# Patient Record
Sex: MALE | Race: BLACK OR AFRICAN AMERICAN | Employment: UNEMPLOYED | URBAN - METROPOLITAN AREA
[De-identification: names, ages, dates, MRNs, and addresses within clinical notes are randomized per-mention and may not be internally consistent; named-entity substitution may affect disease eponyms.]

---

## 2017-09-28 ENCOUNTER — OFFICE VISIT (OUTPATIENT)
Dept: FAMILY MEDICINE CLINIC | Age: 52
End: 2017-09-28

## 2017-09-28 ENCOUNTER — HOSPITAL ENCOUNTER (OUTPATIENT)
Dept: LAB | Age: 52
Discharge: HOME | End: 2017-09-28

## 2017-09-28 VITALS
BODY MASS INDEX: 26.63 KG/M2 | SYSTOLIC BLOOD PRESSURE: 141 MMHG | OXYGEN SATURATION: 98 % | TEMPERATURE: 97.9 F | HEART RATE: 69 BPM | WEIGHT: 186 LBS | RESPIRATION RATE: 16 BRPM | HEIGHT: 70 IN | DIASTOLIC BLOOD PRESSURE: 88 MMHG

## 2017-09-28 DIAGNOSIS — M62.838 CERVICAL PARASPINAL MUSCLE SPASM: ICD-10-CM

## 2017-09-28 DIAGNOSIS — I10 BENIGN ESSENTIAL HYPERTENSION WITH TARGET BLOOD PRESSURE BELOW 140/90: ICD-10-CM

## 2017-09-28 DIAGNOSIS — T73.3XXS FATIGUE DUE TO EXCESSIVE EXERTION, SEQUELA: ICD-10-CM

## 2017-09-28 DIAGNOSIS — H66.93 CHRONIC EAR INFECTION, BILATERAL: ICD-10-CM

## 2017-09-28 DIAGNOSIS — J30.1 ALLERGIC RHINITIS DUE TO POLLEN, UNSPECIFIED RHINITIS SEASONALITY: ICD-10-CM

## 2017-09-28 DIAGNOSIS — M13.0 POLYARTICULAR ARTHRITIS: ICD-10-CM

## 2017-09-28 DIAGNOSIS — F41.9 ANXIETY: ICD-10-CM

## 2017-09-28 DIAGNOSIS — Z29.9 PREVENTIVE MEASURE: ICD-10-CM

## 2017-09-28 DIAGNOSIS — R35.89 POLYURIA: ICD-10-CM

## 2017-09-28 DIAGNOSIS — M05.721 RHEUMATOID ARTHRITIS INVOLVING RIGHT ELBOW WITH POSITIVE RHEUMATOID FACTOR (HCC): ICD-10-CM

## 2017-09-28 DIAGNOSIS — E79.0 HYPERURICEMIA: Primary | ICD-10-CM

## 2017-09-28 PROCEDURE — 99001 SPECIMEN HANDLING PT-LAB: CPT | Performed by: INTERNAL MEDICINE

## 2017-09-28 RX ORDER — SILDENAFIL 50 MG/1
50 TABLET, FILM COATED ORAL AS NEEDED
Qty: 6 TAB | Refills: 0 | Status: SHIPPED | OUTPATIENT
Start: 2017-09-28 | End: 2020-10-07 | Stop reason: SDUPTHER

## 2017-09-28 RX ORDER — LANOLIN ALCOHOL/MO/W.PET/CERES
500 CREAM (GRAM) TOPICAL DAILY
Qty: 90 TAB | Refills: 1 | Status: SHIPPED | OUTPATIENT
Start: 2017-09-28 | End: 2019-10-03

## 2017-09-28 RX ORDER — NABUMETONE 750 MG/1
750 TABLET, FILM COATED ORAL
Qty: 60 TAB | Refills: 3 | Status: SHIPPED | OUTPATIENT
Start: 2017-09-28 | End: 2017-09-28 | Stop reason: CLARIF

## 2017-09-28 RX ORDER — FLUTICASONE PROPIONATE 50 MCG
2 SPRAY, SUSPENSION (ML) NASAL DAILY
Qty: 1 BOTTLE | Refills: 3 | Status: SHIPPED | OUTPATIENT
Start: 2017-09-28 | End: 2019-10-03

## 2017-09-28 RX ORDER — LISINOPRIL 40 MG/1
40 TABLET ORAL DAILY
Qty: 90 TAB | Refills: 3 | Status: SHIPPED | OUTPATIENT
Start: 2017-09-28 | End: 2017-10-03 | Stop reason: SINTOL

## 2017-09-28 RX ORDER — LORATADINE 10 MG/1
10 TABLET ORAL DAILY
Qty: 90 TAB | Refills: 1 | Status: SHIPPED | OUTPATIENT
Start: 2017-09-28 | End: 2019-10-03

## 2017-09-28 RX ORDER — CITALOPRAM 20 MG/1
20 TABLET, FILM COATED ORAL DAILY
Qty: 30 TAB | Refills: 0 | Status: SHIPPED | OUTPATIENT
Start: 2017-09-28 | End: 2019-10-03

## 2017-09-28 RX ORDER — LISINOPRIL 40 MG/1
40 TABLET ORAL DAILY
Qty: 90 TAB | Refills: 1 | Status: CANCELLED | OUTPATIENT
Start: 2017-09-28

## 2017-09-28 RX ORDER — AMLODIPINE BESYLATE 10 MG/1
10 TABLET ORAL DAILY
Qty: 90 TAB | Refills: 3 | Status: SHIPPED | OUTPATIENT
Start: 2017-09-28 | End: 2021-10-20 | Stop reason: SDUPTHER

## 2017-09-28 RX ORDER — MELOXICAM 15 MG/1
15 TABLET ORAL DAILY
Qty: 90 TAB | Refills: 2 | Status: SHIPPED | OUTPATIENT
Start: 2017-09-28 | End: 2018-08-28 | Stop reason: SDUPTHER

## 2017-09-28 RX ORDER — CYCLOBENZAPRINE HCL 10 MG
10 TABLET ORAL
Qty: 90 TAB | Refills: 1 | Status: SHIPPED | OUTPATIENT
Start: 2017-09-28 | End: 2019-10-03

## 2017-09-28 RX ORDER — PREDNISONE 20 MG/1
20 TABLET ORAL
Qty: 7 TAB | Refills: 0 | Status: SHIPPED | OUTPATIENT
Start: 2017-09-28 | End: 2017-10-05

## 2017-09-28 RX ORDER — FLUOXETINE 20 MG/1
20 TABLET ORAL DAILY
Qty: 20 TAB | Refills: 0 | Status: CANCELLED | OUTPATIENT
Start: 2017-09-28

## 2017-09-28 NOTE — MR AVS SNAPSHOT
Visit Information Date & Time Provider Department Dept. Phone Encounter #  
 9/28/2017  9:30 AM Noah Meyer, Lm7 KAREN Landers [id number] Follow-up Instructions Return in about 3 weeks (around 10/18/2017). Upcoming Health Maintenance Date Due Pneumococcal 19-64 Medium Risk (1 of 1 - PPSV23) 9/5/1982 DTaP/Tdap/Td series (1 - Tdap) 9/5/1984 FOBT Q 1 YEAR AGE 50-75 9/5/2013 INFLUENZA AGE 9 TO ADULT 8/1/2017 Allergies as of 9/28/2017  Review Complete On: 9/28/2017 By: Noah Meyer, DO No Known Allergies Current Immunizations  Never Reviewed No immunizations on file. Not reviewed this visit You Were Diagnosed With   
  
 Codes Comments Hyperuricemia    -  Primary ICD-10-CM: E79.0 ICD-9-CM: 790.6 Chronic ear infection, bilateral     ICD-10-CM: B95.926 ICD-9-CM: 381. 3 Benign essential hypertension with target blood pressure below 140/90     ICD-10-CM: I10 
ICD-9-CM: 401.1 Polyarticular arthritis     ICD-10-CM: M13.0 ICD-9-CM: 716.50 Rheumatoid arthritis involving right elbow with positive rheumatoid factor ([facility] [address]     ICD-10-CM: C77.017 ICD-9-CM: 714.0 Cervical paraspinal muscle spasm     ICD-10-CM: V06.918 ICD-9-CM: 728.85 Anxiety     ICD-10-CM: F41.9 ICD-9-CM: 300.00 Preventive measure     ICD-10-CM: Z29.9 ICD-9-CM: V07.9 Polyuria     ICD-10-CM: R35.8 ICD-9-CM: 788.42 Fatigue due to excessive exertion, sequela     ICD-10-CM: T73. 3XXS 
ICD-9-CM: 994.5, E927.9 Vitals BP Pulse Temp Resp Height(growth percentile) Weight(growth percentile) 141/88 (BP 1 Location: Right arm, BP Patient Position: Sitting) 69 97.9 °F (36.6 °C) (Oral) 16 5' 10\" (1.778 m) 186 lb (84.4 kg) SpO2 BMI Smoking Status 98% 26.69 kg/m2 Current Every Day Smoker BMI and BSA Data Body Mass Index Body Surface Area  
 26.69 kg/m 2 2.04 m 2 Preferred Pharmacy Pharmacy Name Phone [facility] #[id number] [address] [phone] Your Updated Medication List  
  
   
This list is accurate as of: 9/28/17 10:11 AM.  Always use your most recent med list.  
  
  
  
  
 allopurinol 100 mg tablet Commonly known as:  [place] Take 2 Tabs by mouth daily. amLODIPine 10 mg tablet Commonly known as:  Linda Shield Take 1 Tab by mouth daily. Indications: hypertension  
  
 chlorthalidone 50 mg tablet Commonly known as:  Charlesetta Clear Take 1 Tab by mouth daily. citalopram 20 mg tablet Commonly known as:  Dearl Bury Take 1 Tab by mouth daily. Indications: GENERALIZED ANXIETY DISORDER  
  
 cyanocobalamin 500 mcg tablet Commonly known as:  VITAMIN B12 Take 1 Tab by mouth daily. cyclobenzaprine 10 mg tablet Commonly known as:  FLEXERIL Take 1 Tab by mouth three (3) times daily as needed for Muscle Spasm(s). ergocalciferol 50,000 unit capsule Commonly known as:  ERGOCALCIFEROL Take 1 Cap by mouth every seven (7) days. FLUoxetine 20 mg tablet Commonly known as:  PROzac Take 1 Tab by mouth daily. fluticasone 50 mcg/actuation nasal spray Commonly known as:  FLONASE ALLERGY RELIEF  
2 Sprays by Both Nostrils route daily. lisinopril 40 mg tablet Commonly known as:  Beatriz Bills Take 1 Tab by mouth daily. loratadine 10 mg tablet Commonly known as:  Kip Manual Take 1 Tab by mouth daily. losartan 100 mg tablet Commonly known as:  COZAAR Take 1 Tab by mouth daily. meloxicam 15 mg tablet Commonly known as:  MOBIC Take 1 Tab by mouth daily. nabumetone 750 mg tablet Commonly known as:  RELAFEN Take 1 Tab by mouth two (2) times daily as needed for Pain (Start after completing steroid pack). oxyCODONE-acetaminophen 5-325 mg per tablet Commonly known as:  PERCOCET Take 1 Tab by mouth every eight (8) hours as needed for Pain.  Max Daily Amount: 3 Tabs. predniSONE 20 mg tablet Commonly known as:  Jose M Jabs Take 1 Tab by mouth daily (with breakfast) for 7 days. probenecid-colchicine 500-0.5 mg per tablet Commonly known as:  ColBenemid Take 1 Tab by mouth two (2) times a day. sildenafil citrate 50 mg tablet Commonly known as:  VIAGRA Take 1 Tab by mouth as needed. traMADol 50 mg tablet Commonly known as:  ULTRAM  
Use every 8 hours as needed for breakthrough pain Prescriptions Sent to Pharmacy Refills  
 loratadine (CLARITIN) 10 mg tablet 1 Sig: Take 1 Tab by mouth daily. Class: Normal  
 Pharmacy: [facility] [address] Ph #: [phone] Route: Oral  
 nabumetone (RELAFEN) 750 mg tablet 3 Sig: Take 1 Tab by mouth two (2) times daily as needed for Pain (Start after completing steroid pack). Class: Normal  
 Pharmacy: [facility] #[id number] [address] Ph #: [phone] Route: Oral  
 cyclobenzaprine (FLEXERIL) 10 mg tablet 1 Sig: Take 1 Tab by mouth three (3) times daily as needed for Muscle Spasm(s). Class: Normal  
 Pharmacy: [facility] #[id number] [address] Ph #: [phone] Route: Oral  
 sildenafil citrate (VIAGRA) 50 mg tablet 0 Sig: Take 1 Tab by mouth as needed. Class: Normal  
 Pharmacy: [facility] #[id number] [address] Ph #: [phone] Route: Oral  
 meloxicam (MOBIC) 15 mg tablet 2 Sig: Take 1 Tab by mouth daily. Class: Normal  
 Pharmacy: [facility] #[id number] [address] Ph #: [phone] Route: Oral  
 amLODIPine (NORVASC) 10 mg tablet 3 Sig: Take 1 Tab by mouth daily. Indications: hypertension Class: Normal  
 Pharmacy: [facility] #[id number] [facility], [address] [place] [facility] Ph #: [phone]  Route: Oral  
 predniSONE (DELTASONE) 20 mg tablet 0  
 Sig: Take 1 Tab by mouth daily (with breakfast) for 7 days. Class: Normal  
 Pharmacy: [facility] #4445 [address] Ph #: [phone] Route: Oral  
 lisinopril (PRINIVIL, ZESTRIL) 40 mg tablet 3 Sig: Take 1 Tab by mouth daily. Class: Normal  
 Pharmacy: [facility] #[id number] [address] Ph #: [phone] Route: Oral  
 citalopram (CELEXA) 20 mg tablet 0 Sig: Take 1 Tab by mouth daily. Indications: GENERALIZED ANXIETY DISORDER Class: Normal  
 Pharmacy: [facility] #5664 [address] Ph #: [phone] Route: Oral  
 cyanocobalamin (VITAMIN B12) 500 mcg tablet 1 Sig: Take 1 Tab by mouth daily. Class: Normal  
 Pharmacy: [facility] #7541 [address] Ph #: [phone] Route: Oral  
  
We Performed the Following PSA SCREENING (SCREENING) [ [facility]] REFERRAL TO RHEUMATOLOGY [EYK07 Custom] Comments:  
 Please evaluate patient for Rheumatoid Arthritis. Follow-up Instructions Return in about 3 weeks (around 10/18/2017). To-Do List   
 09/28/2017 Lab:  HEMOGLOBIN A1C WITH EAG   
  
 09/28/2017 Lab:  TESTOSTERONE, FREE & TOTAL   
  
 09/28/2017 Lab:  URIC ACID   
  
 09/28/2017 Lab:  VITAMIN D, 25 HYDROXY Around 12/27/2017 Lab:  CBC WITH AUTOMATED DIFF Around 12/27/2017 Lab:  LIPID PANEL Around 12/27/2017 Lab:  METABOLIC PANEL, COMPREHENSIVE Around 12/27/2017 Lab:  SED RATE (ESR) Around 12/27/2017 Lab:  SED RATE (ESR) Around 12/27/2017 Lab:  TSH 3RD GENERATION Referral Information Referral ID Referred By Referred To  
  
 [id number] [place], PAA-KOFI Maranda Cranker, MD   
   [address] [facility] Phone: [phone] Fax: [phone] Visits Status Start Date End Date 1 New Request 9/28/17 9/28/18 If your referral has a status of pending review or denied, additional information will be sent to support the outcome of this decision. Patient Instructions Please contact our office if you have any questions about your visit today. 1.) They will call you regarding referral to Dr. Obed Garcia, Rheumatologist 
 
2.) Use Prednisone for week. 3.) Use Meloxicam as needed as well as B12 supplement Introducing [facility] SERVICES! Raimundo Valdez introduces [employer] patient portal. Now you can access parts of your medical record, email your doctor's office, and request medication refills online. 1. In your internet browser, go to [email or web address] 2. Click on the First Time User? Click Here link in the Sign In box. You will see the New Member Sign Up page. 3. Enter your [employer] Access Code exactly as it appears below. You will not need to use this code after youve completed the sign-up process. If you do not sign up before the expiration date, you must request a new code. · [employer] Access Code: [email or web address] Expires: 12/27/2017  9:18 AM 
 
4. Enter the last four digits of your Social Security Number (xxxx) and Date of Birth (mm/dd/yyyy) as indicated and click Submit. You will be taken to the next sign-up page. 5. Create a ChaseFuture ID. This will be your [employer] login ID and cannot be changed, so think of one that is secure and easy to remember. 6. Create a ChaseFuture password. You can change your password at any time. 7. Enter your Password Reset Question and Answer. This can be used at a later time if you forget your password. 8. Enter your e-mail address. You will receive e-mail notification when new information is available in [address] 9. Click Sign Up. You can now view and download portions of your medical record. 10. Click the Download Summary menu link to download a portable copy of your medical information. If you have questions, please visit the Frequently Asked Questions section of the [employer] website. Remember, [employer] is NOT to be used for urgent needs. For medical emergencies, dial 911. Now available from your iPhone and Android! Please provide this summary of care documentation to your next provider. Your primary care clinician is listed as 85906Jackie Fuentes. If you have any questions after today's visit, please call [phone].

## 2017-09-28 NOTE — PROGRESS NOTES
Progress Note    Patient: Ijeoma Yates MRN: [id number]  SSN: [id number]    YOB: 1965  Age: 45 y.o. Sex: male          Subjective:   Ijeoma Yates is a 45 y.o. male who is here for follow up. The patient is concerned about his blood pressure. He states that he has been getting dizziness and headache. He mentions that he has been getting up to urinate multiple times in the night---about 4-5 times a night. He denies any burning on urination. He denies any blood in the urine. He also mentions that he has been fatigued. He states that he was out of his BP meds for about a week. He states that he just got his refills yesterday. The patient mentions that he has become very nervous as well. He has been feeling this way for the past 2 months. The patient mentions that he thinks that this is related to his Rheumatoid Arthritis. He mentions that he has not gotten in with the Rheumatologist either. Objective:     Past Medical History:   Diagnosis Date    Arthritis     Chronic pain     joints due to Rheumatoid Arthritis    Contact dermatitis and other eczema, due to unspecified cause     dry skin in scalp    Depression     Headache     Hypertension     Rheumatoid arthritis ([address] 1983    Rheumatoid arthritis ([facility] [address]     Seasonal allergic rhinitis         Vitals:    09/28/17 0930   BP: 141/88   Pulse: 69   Resp: 16   Temp: 97.9 °F (36.6 °C)   TempSrc: Oral   SpO2: 98%   Weight: 186 lb (84.4 kg)   Height: 5' 10\" (1.778 m)        Review of Systems:  Pertinent items are noted in the History of Present Illness. Physical Exam:   GENERAL: alert, cooperative, no distress, appears stated age  EYE: conjunctivae/corneas clear. PERRL, EOM's intact. Fundi benign  LYMPHATIC: Cervical, supraclavicular, and axillary nodes normal.   THROAT & NECK: normal and no erythema or exudates noted.    LUNG: clear to auscultation bilaterally  HEART: regular rate and rhythm, S1, S2 normal, no murmur, click, rub or gallop  ABDOMEN: soft, non-tender. Bowel sounds normal. No masses,  no organomegaly  EXTREMITIES:  extremities normal, atraumatic, no cyanosis or edema  NEUROLOGIC: Gait abnormality noted on exam   PSYCHIATRIC: anxious  MUSCULOSKELETAL: increased tenderness in bilateral knees and elbows. Ulnar deviation on right wrist.     Lab/Data Review:    Lab Results   Component Value Date/Time    Sodium 137 11/16/2016 10:47 AM    Potassium 4.2 11/16/2016 10:47 AM    Chloride 97 11/16/2016 10:47 AM    CO2 26 11/16/2016 10:47 AM    Glucose 97 11/16/2016 10:47 AM    BUN 10 11/16/2016 10:47 AM    Creatinine 0.86 11/16/2016 10:47 AM    BUN/Creatinine ratio 12 11/16/2016 10:47 AM    GFR est  11/16/2016 10:47 AM    GFR est non-AA 99 11/16/2016 10:47 AM    Calcium 9.8 11/16/2016 10:47 AM    Bilirubin, total 0.4 11/16/2016 10:47 AM    AST (SGOT) 18 11/16/2016 10:47 AM    Alk. phosphatase 76 11/16/2016 10:47 AM    Protein, total 7.7 11/16/2016 10:47 AM    Albumin 4.0 11/16/2016 10:47 AM    A-G Ratio 1.1 11/16/2016 10:47 AM    ALT (SGPT) 9 11/16/2016 10:47 AM     Lab Results   Component Value Date/Time    VITAMIN D, 25-HYDROXY 30.6 11/16/2016 10:47 AM       Lab Results   Component Value Date/Time    TSH 1.310 11/16/2016 10:47 AM     Lab Results   Component Value Date/Time    WBC 8.0 11/16/2016 10:47 AM    HGB 12.0 11/16/2016 10:47 AM    HCT 34.8 11/16/2016 10:47 AM    PLATELET 806 11/87/8197 10:47 AM    MCV 81 11/16/2016 10:47 AM           Assessment:     1.) Essential Hypertension with goal BP< 140/90: Patient switched to lisinopril from chlorthalidone and amlodipine will continue. 2.) Rheumatoid Arthritis: Patient re-referred to rheumatology for evaluation and treatment. He was placed on Prednisone for one week. He was switched to Meloxicam for first line treatment of symptomatic management. 3.) Anxiety and Depression: Patient switched to Citalopram from Prozac. 4.) Cervical Neck Muscle Spasm: Flexeril ordered.      5.) Allergic Rhinitis with sinus congestion: Patient's Loratadine reordered. 6.) Sexual Dysfunction: Testosterone levels re-evaluated. Patient reordered Viagra and he will pay out of pocket. 7.) Hyperuricemia: Uric Acid drawn in the office today. 8.) Preventive: Labs drawn as noted below. I personally reviewed all labs with the patient. Patient will return in 3 months for follow up.        Plan:     Orders Placed This Encounter    LIPID PANEL    SED RATE (ESR)    CBC WITH AUTOMATED DIFF    METABOLIC PANEL, COMPREHENSIVE    TSH 3RD GENERATION    HEMOGLOBIN A1C WITH EAG    VITAMIN D, 25 HYDROXY    URIC ACID    PSA SCREENING ()    TESTOSTERONE, FREE & TOTAL    SED RATE (ESR)    REFERRAL TO RHEUMATOLOGY    loratadine (CLARITIN) 10 mg tablet    DISCONTD: nabumetone (RELAFEN) 750 mg tablet    cyclobenzaprine (FLEXERIL) 10 mg tablet    sildenafil citrate (VIAGRA) 50 mg tablet    meloxicam (MOBIC) 15 mg tablet    amLODIPine (NORVASC) 10 mg tablet    predniSONE (DELTASONE) 20 mg tablet    lisinopril (PRINIVIL, ZESTRIL) 40 mg tablet    citalopram (CELEXA) 20 mg tablet    cyanocobalamin (VITAMIN B12) 500 mcg tablet    fluticasone (FLONASE ALLERGY RELIEF) 50 mcg/actuation nasal spray         Signed By: Yin Kramer DO     September 28, 2017

## 2017-09-28 NOTE — PROGRESS NOTES
Chief Complaint   Patient presents with    Hypertension    Arthritis     states that he has RA and that he is out of pain medication and would like something stronger than what he took previously     1. Have you been to the ER, urgent care clinic since your last visit? Hospitalized since your last visit? No    2. Have you seen or consulted any other health care providers outside of the [address] [place] [address] since your last visit? Include any pap smears or colon screening.  No

## 2017-09-28 NOTE — PATIENT INSTRUCTIONS
Please contact our office if you have any questions about your visit today. 1.) They will call you regarding referral to Dr. Vanessa Corea, Rheumatologist    2.) Use Prednisone for week.      3.) Use Meloxicam as needed as well as B12 supplement

## 2017-09-30 LAB
25(OH)D3+25(OH)D2 SERPL-MCNC: 29.8 NG/ML (ref 30–100)
ALBUMIN SERPL-MCNC: 4.2 G/DL (ref 3.5–5.5)
ALBUMIN/GLOB SERPL: 1 {RATIO} (ref 1.2–2.2)
ALP SERPL-CCNC: 72 IU/L (ref 39–117)
ALT SERPL-CCNC: 22 IU/L (ref 0–44)
AST SERPL-CCNC: 28 IU/L (ref 0–40)
BASOPHILS # BLD AUTO: 0 X10E3/UL (ref 0–0.2)
BASOPHILS NFR BLD AUTO: 0 %
BILIRUB SERPL-MCNC: 0.5 MG/DL (ref 0–1.2)
BUN SERPL-MCNC: 9 MG/DL (ref 6–24)
BUN/CREAT SERPL: 10 (ref 9–20)
CALCIUM SERPL-MCNC: 10 MG/DL (ref 8.7–10.2)
CHLORIDE SERPL-SCNC: 96 MMOL/L (ref 96–106)
CHOLEST SERPL-MCNC: 172 MG/DL (ref 100–199)
CO2 SERPL-SCNC: 28 MMOL/L (ref 18–29)
CREAT SERPL-MCNC: 0.89 MG/DL (ref 0.76–1.27)
EOSINOPHIL # BLD AUTO: 0 X10E3/UL (ref 0–0.4)
EOSINOPHIL NFR BLD AUTO: 0 %
ERYTHROCYTE [DISTWIDTH] IN BLOOD BY AUTOMATED COUNT: 18.7 % (ref 12.3–15.4)
ERYTHROCYTE [SEDIMENTATION RATE] IN BLOOD BY WESTERGREN METHOD: 2 MM/HR (ref 0–30)
EST. AVERAGE GLUCOSE BLD GHB EST-MCNC: 85 MG/DL
GLOBULIN SER CALC-MCNC: 4.2 G/DL (ref 1.5–4.5)
GLUCOSE SERPL-MCNC: 102 MG/DL (ref 65–99)
HBA1C MFR BLD: 4.6 % (ref 4.8–5.6)
HCT VFR BLD AUTO: 40.7 % (ref 37.5–51)
HDLC SERPL-MCNC: 74 MG/DL
HGB BLD-MCNC: 13.6 G/DL (ref 12.6–17.7)
IMM GRANULOCYTES # BLD: 0 X10E3/UL (ref 0–0.1)
IMM GRANULOCYTES NFR BLD: 0 %
INTERPRETATION, 910389: NORMAL
LDLC SERPL CALC-MCNC: 75 MG/DL (ref 0–99)
LYMPHOCYTES # BLD AUTO: 2.6 X10E3/UL (ref 0.7–3.1)
LYMPHOCYTES NFR BLD AUTO: 29 %
MCH RBC QN AUTO: 28.4 PG (ref 26.6–33)
MCHC RBC AUTO-ENTMCNC: 33.4 G/DL (ref 31.5–35.7)
MCV RBC AUTO: 85 FL (ref 79–97)
MONOCYTES # BLD AUTO: 0.5 X10E3/UL (ref 0.1–0.9)
MONOCYTES NFR BLD AUTO: 6 %
NEUTROPHILS # BLD AUTO: 5.7 X10E3/UL (ref 1.4–7)
NEUTROPHILS NFR BLD AUTO: 65 %
PLATELET # BLD AUTO: 229 X10E3/UL (ref 150–379)
POTASSIUM SERPL-SCNC: 3.9 MMOL/L (ref 3.5–5.2)
PROT SERPL-MCNC: 8.4 G/DL (ref 6–8.5)
PSA SERPL-MCNC: 1.2 NG/ML (ref 0–4)
RBC # BLD AUTO: 4.79 X10E6/UL (ref 4.14–5.8)
SODIUM SERPL-SCNC: 139 MMOL/L (ref 134–144)
TESTOST FREE SERPL-MCNC: 8.8 PG/ML (ref 7.2–24)
TESTOST SERPL-MCNC: 586 NG/DL (ref 264–916)
TRIGL SERPL-MCNC: 117 MG/DL (ref 0–149)
TSH SERPL DL<=0.005 MIU/L-ACNC: 0.99 UIU/ML (ref 0.45–4.5)
URATE SERPL-MCNC: 10.2 MG/DL (ref 3.7–8.6)
VLDLC SERPL CALC-MCNC: 23 MG/DL (ref 5–40)
WBC # BLD AUTO: 8.9 X10E3/UL (ref 3.4–10.8)

## 2018-06-06 ENCOUNTER — OFFICE VISIT (OUTPATIENT)
Dept: ORTHOPEDIC SURGERY | Age: 53
End: 2018-06-06

## 2018-06-06 VITALS
WEIGHT: 194.6 LBS | BODY MASS INDEX: 26.36 KG/M2 | HEART RATE: 63 BPM | SYSTOLIC BLOOD PRESSURE: 140 MMHG | RESPIRATION RATE: 18 BRPM | HEIGHT: 72 IN | DIASTOLIC BLOOD PRESSURE: 83 MMHG

## 2018-06-06 DIAGNOSIS — M47.812 CERVICAL SPONDYLOSIS WITHOUT MYELOPATHY: ICD-10-CM

## 2018-06-06 DIAGNOSIS — M06.9 RHEUMATOID ARTHRITIS INVOLVING MULTIPLE SITES, UNSPECIFIED RHEUMATOID FACTOR PRESENCE: ICD-10-CM

## 2018-06-06 DIAGNOSIS — M50.30 DDD (DEGENERATIVE DISC DISEASE), CERVICAL: ICD-10-CM

## 2018-06-06 DIAGNOSIS — M54.2 NECK PAIN: Primary | ICD-10-CM

## 2018-06-06 RX ORDER — CETIRIZINE HCL 10 MG
TABLET ORAL
COMMUNITY
Start: 2018-05-11 | End: 2022-01-27 | Stop reason: SDUPTHER

## 2018-06-06 RX ORDER — IBUPROFEN 600 MG/1
TABLET ORAL
COMMUNITY
Start: 2018-05-11 | End: 2019-10-03

## 2018-06-06 RX ORDER — AMOXICILLIN 500 MG/1
CAPSULE ORAL
COMMUNITY
Start: 2018-05-29 | End: 2018-08-28 | Stop reason: ALTCHOICE

## 2018-06-06 RX ORDER — METHYLPREDNISOLONE 4 MG/1
TABLET ORAL
Qty: 1 DOSE PACK | Refills: 0 | Status: SHIPPED | OUTPATIENT
Start: 2018-06-06 | End: 2018-08-28 | Stop reason: ALTCHOICE

## 2018-06-06 RX ORDER — MELOXICAM 15 MG/1
15 TABLET ORAL DAILY
Qty: 15 TAB | Refills: 0 | Status: SHIPPED | OUTPATIENT
Start: 2018-06-06 | End: 2019-10-03

## 2018-06-06 NOTE — MR AVS SNAPSHOT
[facility] 
 
 
 [address] [facility] 
[phone] Patient: Ryan Lockhart MRN: [id number] NSZ:[id number] Visit Information Date & Time Provider Department Dept. Phone Encounter #  
 6/6/2018 10:35 AM Don Coles  [facility] [address] [facility] [place] [phone] [id number] Follow-up Instructions Return in about 4 weeks (around 7/4/2018). Upcoming Health Maintenance Date Due Pneumococcal 19-64 Medium Risk (1 of 1 - PPSV23) 9/5/1982 DTaP/Tdap/Td series (1 - Tdap) 9/5/1984 FOBT Q 1 YEAR AGE 50-75 9/5/2013 Influenza Age 5 to Adult 8/1/2018 Allergies as of 6/6/2018  Review Complete On: 6/6/2018 By: Don Coles MD  
  
 Severity Noted Reaction Type Reactions Lisinopril High 10/03/2017    Anaphylaxis Patient experienced throat swelling and respiratory distress as a result. Current Immunizations  Never Reviewed No immunizations on file. Not reviewed this visit You Were Diagnosed With   
  
 Codes Comments Neck pain    -  Primary ICD-10-CM: M54.2 ICD-9-CM: 723.1 Cervical spondylosis without myelopathy     ICD-10-CM: R66.303 ICD-9-CM: 721.0   
 DDD (degenerative disc disease), cervical     ICD-10-CM: M50.30 ICD-9-CM: 722.4 Rheumatoid arthritis involving multiple sites, unspecified rheumatoid factor presence ([facility]     ICD-10-CM: M06.9 ICD-9-CM: 714.0 Vitals BP Pulse Resp Height(growth percentile) Weight(growth percentile) BMI  
 140/83 63 18 6' (1.829 m) 194 lb 9.6 oz (88.3 kg) 26.39 kg/m2 Smoking Status Current Every Day Smoker Vitals History BMI and BSA Data Body Mass Index Body Surface Area  
 26.39 kg/m 2 2.12 m 2 [facility] [place], [address] [phone] Your Updated Medication List  
  
   
 This list is accurate as of 6/6/18 12:28 PM.  Always use your most recent med list.  
  
  
  
  
 allopurinol 100 mg tablet Commonly known as:  Mauricio Chopra Take 2 Tabs by mouth daily. amLODIPine 10 mg tablet Commonly known as:  Avery Iman Take 1 Tab by mouth daily. Indications: hypertension  
  
 amoxicillin 500 mg capsule Commonly known as:  AMOXIL  
  
 cetirizine 10 mg tablet Commonly known as:  ZYRTEC  
  
 chlorthalidone 50 mg tablet Commonly known as:  Susy Pinch Take 1 Tab by mouth daily. citalopram 20 mg tablet Commonly known as:  Radha Finder Take 1 Tab by mouth daily. Indications: GENERALIZED ANXIETY DISORDER  
  
 cyanocobalamin 500 mcg tablet Commonly known as:  VITAMIN B12 Take 1 Tab by mouth daily. cyclobenzaprine 10 mg tablet Commonly known as:  FLEXERIL Take 1 Tab by mouth three (3) times daily as needed for Muscle Spasm(s). ergocalciferol 50,000 unit capsule Commonly known as:  ERGOCALCIFEROL Take 1 Cap by mouth every seven (7) days. FLUoxetine 20 mg tablet Commonly known as:  PROzac Take 1 Tab by mouth daily. fluticasone 50 mcg/actuation nasal spray Commonly known as:  FLONASE ALLERGY RELIEF  
2 Sprays by Both Nostrils route daily.  mg tablet Generic drug:  ibuprofen  
  
 loratadine 10 mg tablet Commonly known as:  Anna Sandovaller Take 1 Tab by mouth daily. losartan 100 mg tablet Commonly known as:  COZAAR Take 1 Tab by mouth daily. * meloxicam 15 mg tablet Commonly known as:  MOBIC Take 1 Tab by mouth daily. * meloxicam 15 mg tablet Commonly known as:  MOBIC Take 1 Tab by mouth daily. methylPREDNISolone 4 mg tablet Commonly known as:  Shalonda Pemberton Per dose pack instructions  
  
 oxyCODONE-acetaminophen 5-325 mg per tablet Commonly known as:  PERCOCET Take 1 Tab by mouth every eight (8) hours as needed for Pain. Max Daily Amount: 3 Tabs. probenecid-colchicine 500-0.5 mg per tablet Commonly known as:  ColBenemid Take 1 Tab by mouth two (2) times a day. sildenafil citrate 50 mg tablet Commonly known as:  VIAGRA Take 1 Tab by mouth as needed. traMADol 50 mg tablet Commonly known as:  ULTRAM  
Use every 8 hours as needed for breakthrough pain * Notice: This list has 2 medication(s) that are the same as other medications prescribed for you. Read the directions carefully, and ask your doctor or other care provider to review them with you. Prescriptions Sent to Pharmacy Refills  
 methylPREDNISolone (MEDROL DOSEPACK) 4 mg tablet 0 Sig: Per dose pack instructions Class: Normal  
 Pharmacy: [facility] [address] Ph #: [phone]  
 meloxicam (MOBIC) 15 mg tablet 0 Sig: Take 1 Tab by mouth daily. Class: Normal  
 Pharmacy: [facility] - [place], [address] [place] [address] Ph #: [phone] Route: Oral  
  
We Performed the Following AMB POC XRAY, SPINE, CERVICAL; 2 OR 3 [30050 CPT(R)] REFERRAL TO PHYSICAL THERAPY [NHX66 Custom] Comments:  
 DX:Cspine Eval and Treat HEP 
LOCATION:[facility] [address] 
2-3 visits/ 2-3 weeks REFERRAL TO RHEUMATOLOGY [MUD00 Custom] Comments:  
 Eval-New Patient-was previously followed by a rheumatologist  
  
Follow-up Instructions Return in about 4 weeks (around 7/4/2018). Referral Information Referral ID Referred By Referred To  
  
 [id number] Thalia Tompkins MD   
   [facility] [address] [place], [address]  Phone: [phone] Fax: [phone] Visits Status Start Date End Date 1 New Request 6/6/18 6/6/19 If your referral has a status of pending review or denied, additional information will be sent to support the outcome of this decision. Referral ID Referred By Referred To [id number] ANN MARIE CORMIER III Not Available Visits Status Start Date End Date 1 New Request 6/6/18 6/6/19 If your referral has a status of pending review or denied, additional information will be sent to support the outcome of this decision. Introducing \[facility]\"" & HEALTH SERVICES! [facility] introduces [employer] patient portal. Now you can access parts of your medical record, email your doctor's office, and request medication refills online. 1. In your internet browser, go to [email or web address] 2. Click on the First Time User? Click Here link in the Sign In box. You will see the New Member Sign Up page. 3. Enter your [employer] Access Code exactly as it appears below. You will not need to use this code after youve completed the sign-up process. If you do not sign up before the expiration date, you must request a new code. · [employer] Access Code: [email or web address] Expires: 9/4/2018 10:02 AM 
 
4. Enter the last four digits of your Social Security Number (xxxx) and Date of Birth (mm/dd/yyyy) as indicated and click Submit. You will be taken to the next sign-up page. 5. Create a [employer] ID. This will be your [employer] login ID and cannot be changed, so think of one that is secure and easy to remember. 6. Create a [employer] password. You can change your password at any time. 7. Enter your Password Reset Question and Answer. This can be used at a later time if you forget your password. 8. Enter your e-mail address. You will receive e-mail notification when new information is available in [address] 9. Click Sign Up. You can now view and download portions of your medical record. 10. Click the Download Summary menu link to download a portable copy of your medical information. If you have questions, please visit the Frequently Asked Questions section of the [employer] website. Remember, [employer] is NOT to be used for urgent needs. For medical emergencies, dial 911. Now available from your iPhone and Android! Please provide this summary of care documentation to your next provider. Your primary care clinician is listed as Nicole Estrada. If you have any questions after today's visit, please call [phone].

## 2018-06-06 NOTE — PROGRESS NOTES
[facility]  [address], [address]  Phone: [phone]  Fax: [phone]        INITIAL CONSULTATION      HISTORY OF PRESENT ILLNESS:  Álvaro Wolff is a 45 y.o. male whom is referred from Dr. Varinder Paul secondary to neck pain. He rates pain 8/10. ER note dated 5/11/18 indicating patient presented for facial pain, pain behind left eye, left ear pain, muscle spasms of the neck and acute sinusitis. At that time, he was started on Flexeril, Flonase, Motrin and Zyrtec. Note from Dr. Varinder Paul dated 5/24/18 indicating patient was seen with c/o neck pain and HA. At that time, she prescribed Meloxicam for neck pain and Amoxicillin for acute sinusitis. PMHx includes rheumatoid arthritis (has never been followed by rheumatology), depression, EtOH abuse. Pt denies h/o DM. The patient is LHD.  reviewed. Body mass index is 26.39 kg/(m^2). PCP: Uziel Olson DO    Past Medical History:   Diagnosis Date    Arthritis     Chronic pain     joints due to Rheumatoid Arthritis    Contact dermatitis and other eczema, due to unspecified cause     dry skin in scalp    Depression     Headache(784.0)     Hypertension     Rheumatoid arthritis(714.0) 1983    Rheumatoid arthritis(714.0)     Seasonal allergic rhinitis           Past Surgical History:   Procedure Laterality Date    HX CYST REMOVAL  1983    large cyst removed on left shoulder         Social History   Substance Use Topics    Smoking status: Current Every Day Smoker     Packs/day: 1.00     Years: 25.00     Types: Cigarettes    Smokeless tobacco: Never Used    Alcohol use Yes      Comment: daily     Work status: The patient is unemployed. Marital status: The patient is .      Current Outpatient Prescriptions   Medication Sig Dispense Refill    amoxicillin (AMOXIL) 500 mg capsule       cetirizine (ZYRTEC) 10 mg tablet        mg tablet       methylPREDNISolone (MEDROL DOSEPACK) 4 mg tablet Per dose pack instructions 1 Dose Pack 0    meloxicam (MOBIC) 15 mg tablet Take 1 Tab by mouth daily. 15 Tab 0    cyclobenzaprine (FLEXERIL) 10 mg tablet Take 1 Tab by mouth three (3) times daily as needed for Muscle Spasm(s). 90 Tab 1    meloxicam (MOBIC) 15 mg tablet Take 1 Tab by mouth daily. 90 Tab 2    amLODIPine (NORVASC) 10 mg tablet Take 1 Tab by mouth daily. Indications: hypertension 90 Tab 3    loratadine (CLARITIN) 10 mg tablet Take 1 Tab by mouth daily. 90 Tab 1    sildenafil citrate (VIAGRA) 50 mg tablet Take 1 Tab by mouth as needed. 6 Tab 0    citalopram (CELEXA) 20 mg tablet Take 1 Tab by mouth daily. Indications: GENERALIZED ANXIETY DISORDER 30 Tab 0    cyanocobalamin (VITAMIN B12) 500 mcg tablet Take 1 Tab by mouth daily. 90 Tab 1    fluticasone (FLONASE ALLERGY RELIEF) 50 mcg/actuation nasal spray 2 Sprays by Both Nostrils route daily. 1 Bottle 3    traMADol (ULTRAM) 50 mg tablet Use every 8 hours as needed for breakthrough pain 30 Tab 0    chlorthalidone (HYGROTEN) 50 mg tablet Take 1 Tab by mouth daily. 90 Tab 1    FLUoxetine (PROZAC) 20 mg tablet Take 1 Tab by mouth daily. 20 Tab 0    ergocalciferol (ERGOCALCIFEROL) 50,000 unit capsule Take 1 Cap by mouth every seven (7) days. 4 Cap 2    colchicine-probenecid (COLBENEMID) 0.5-500 mg per tablet Take 1 Tab by mouth two (2) times a day. 60 Tab 0    allopurinol (ZYLOPRIM) 100 mg tablet Take 2 Tabs by mouth daily. 60 Tab 2    oxyCODONE-acetaminophen (PERCOCET) 5-325 mg per tablet Take 1 Tab by mouth every eight (8) hours as needed for Pain. Max Daily Amount: 3 Tabs. 30 Tab 0    losartan (COZAAR) 100 mg tablet Take 1 Tab by mouth daily. 30 Tab 2       Allergies   Allergen Reactions    Lisinopril Anaphylaxis     Patient experienced throat swelling and respiratory distress as a result.               Family History   Problem Relation Age of Onset    Emphysema Mother    [facility] Arthritis-rheumatoid Mother     Emphysema Father    [facility] Arthritis-rheumatoid Father     Cancer Sister 35     breast    Liver Disease Brother     Thyroid Disease Brother          REVIEW OF SYSTEMS  Constitutional symptoms: Negative  Eyes: Negative  Ears, Nose, Throat, and Mouth: Negative  Cardiovascular: Negative  Respiratory: Negative  Genitourinary: Negative  Integumentary (Skin and/or breast): Negative  Musculoskeletal: Positive for neck pain, b/l knee pain. Extremities: Negative for edema. Endocrine/Rheumatologic: Negative  Hematologic/Lymphatic: Negative  Allergic/Immunologic: Negative  Psychiatric: Negative       PHYSICAL EXAMINATION    Visit Vitals    /83    Pulse 63    Resp 18    Ht 6' (1.829 m)    Wt 88.3 kg (194 lb 9.6 oz)    BMI 26.39 kg/m2       CONSTITUTIONAL: NAD, A&O x 3  HEART: Regular rate and rhythm  ABDOMEN: Positive bowel sounds, soft, nontender, and nondistended  LUNGS: Clear to auscultation bilaterally. SKIN: Negative for rash. RANGE OF MOTION: The patient has full passive range of motion in all four extremities. SENSATION: Decreased sensation to light touch at the first digit of the RUE. Sensation to light touch otherwise intact. EXTREMITIES: Arthritic changes, left wrist, first digit of the RUE. First digit of the LUE appears to be swollen. MOTOR:   Straight Leg Raise: Negative, bilateral  Trinh: Negative, bilateral  Tandem Gait: Neg. Deep tendon reflexes are 1+ at the brachioradialis and biceps, and 0 at the triceps. Deep tendon reflexes are 0 at the knees and ankles bilaterally. Shoulder AB/Flex Elbow Flex Wrist Ext Elbow Ext Wrist Flex Hand Intrin Tone   Right +4/5 +4/5 +4/5 +4/5 +4/5 +4/5 4/5   Left +4/5 +4/5 +4/5 +4/5 +4/5 +4/5 4/5              Hip Flex Knee Ext Knee Flex Ankle DF GTE Ankle PF Tone   Right +4/5 +4/5 +4/5 +4/5 +4/5 +4/5 +4/5   Left +4/5 +4/5 +4/5 +4/5 +4/5 +4/5 +4/5     RADIOGRAPHS  Preliminary reading of cervical plain films:  Nonspecific straightening of the normal cervical lordosis.  Disc space narrowing at C5-6, C6-7. Small anterior osteophytes noted at C4-C7. No acute pathology identified. These are being sent out for official reading by Dr. Nabeel Diggs. ASSESSMENT   Diagnoses and all orders for this visit:    1. Neck pain  -     [93347] C Spine 2-3V  -     REFERRAL TO PHYSICAL THERAPY    2. Cervical spondylosis without myelopathy  -     REFERRAL TO PHYSICAL THERAPY    3. DDD (degenerative disc disease), cervical  -     REFERRAL TO PHYSICAL THERAPY    4. Rheumatoid arthritis involving multiple sites, unspecified rheumatoid factor presence (HCC)  -     REFERRAL TO RHEUMATOLOGY  -     REFERRAL TO PHYSICAL THERAPY    Other orders  -     methylPREDNISolone (MEDROL DOSEPACK) 4 mg tablet; Per dose pack instructions  -     meloxicam (MOBIC) 15 mg tablet; Take 1 Tab by mouth daily. IMPRESSIONS/RECOMMENDATIONS:  The patient presents for neck pain. He is neurologically intact. Patient with multiple joint pain complaints and I will refer him to Dr. Alanis Guadarrama for management of his RA. I will start him on Medrol Dosepak. I gave him a prescription for Meloxicam 15 mg qd x 2 weeks following completion of the Medrol Dosepak. . I will refer him to physical therapy with an emphasis on HEP. I will see the patient back in 1 month's time or earlier if needed. Written by Bibi Ron, as dictated by Crystal Gomez MD  I examined the patient, reviewed and agree with the note.

## 2018-06-12 ENCOUNTER — HOSPITAL ENCOUNTER (OUTPATIENT)
Dept: PHYSICAL THERAPY | Age: 53
Discharge: HOME | End: 2018-06-12
Payer: MEDICAID

## 2018-06-12 PROCEDURE — 97110 THERAPEUTIC EXERCISES: CPT

## 2018-06-12 PROCEDURE — 97162 PT EVAL MOD COMPLEX 30 MIN: CPT

## 2018-06-12 PROCEDURE — 97140 MANUAL THERAPY 1/> REGIONS: CPT

## 2018-06-12 NOTE — PROGRESS NOTES
PT DAILY TREATMENT NOTE     Patient Name: Salina Beth  Date:2018  : 1965  [x]  Patient  Verified  Payor: [facility] MEDICAID / [facility] Product Type: Managed Care Medicaid /    In time:950  Out time:1045  Total Treatment Time (min): 55  Total 1:1 Time: 55  Total Timed Codes: 25  Visit #: 1 of 12    Treatment Area: Cervicalgia [M54.2]  Spondylosis without myelopathy or radiculopathy, cervical region [M47.812]    Physical Therapy Evaluation Cervical Spine    SUBJECTIVE    Any medication changes, allergies to medications, adverse drug reactions, diagnosis change, or new procedure performed?: [x] No    [] Yes (see summary sheet for update)    Subjective functional status/changes:     PLOF: LHD, Does not work, (I) Functional ADLs, Chronic difficulties with self-care and household ADLs secondary to RA  Current Functional Status: Difficulty with cutting grass,   Work Hx: Does not work   Living Situation: Lives with family   Comorbidities: Chronic Back Pain, RA, HTN, HA, Tobacco Use  Medications: Steroid Dose Pack (has not started), Meloxicam (1x/day)     Pain Intensity (0-10, VAS): Current 8, Worst 10, Best 0    Patient Goals: \"I would love to get the grinding out of my neck\"     OBJECTIVE    BP: 120/76 mmHg    Posture: Flattened thoracic kyphosis, Right scapular protraction/downward rotation, Flattened cervical lordosis, No change in symptoms with jaw opening    Cervical Myelopathy Screen: Rhomberg (EC) 30 sec, (-) Trinh    Shoulder/Scapular Screen: Patient without reproduction of pain with completion of the following shoulder AROM bilaterally: Flexion, Abduction, Functional ER, Functional IR    Active Movements: [] N/A   [] Too acute   [] Other:  ROM AROM Comments   Forward flexion 50 All motion at CTJ   Extension 50 Left upper cervical stabbing pain (8/10)   SB right 30 Left upper cervical pain   SB left  30 Left upper cervical pain   Rotation right 42 Left upper cervical \"pull\" Rotation left 55 Left upper cervical pain     Thoracic Spine: Thoracic Rotation (Left 25% limited, Right 25% limited), Extension 90% limited, Flexion 75% limited [No pain reproduction with all thoracic AROM)    Palpation: No TTP to cervical SP and bilateral cervical paraspinal in sitting, TTP to left suboccipital musculature     Neurological Screen (myotome/dermatome/reflexes): [] WNL   Myotomal Strength (C4-T1): Symmetrical, normal strength bilaterally C4-T1    Cervical Special Tests:       Alar Ligament: Right (end-feel noted) / Left (unable to assess end-feel)       Transverse Ligament: Normal          Cervical Flexion Rotation Test: (+) Left / (+) Right    Joint Mobility Assessment   Cervical: Hypomobility left C2-C4 with lateral cervical side glide, Hypomobile left/right OA distraction    OBJECTIVE    30 min [x]Eval                  []Re-Eval     15 min Therapeutic Exercise:  [x] See flow sheet : Reviewed with patient prescribed HEP and provided patient with written HEP instructions, Patient educated regarding diagnosis and PT PoC   Rationale: increase ROM and increase strength to improve the patients ability to improve ease with household ADLs    10 min Manual Therapy:    Supine, Left/Right OA Manual Grade II Distraction  Supine, Upper Cervical Manual Distraction  Supine, Left Sub-Occipital STM  Supine, Manually-Resisted Upper Cervical Flexion (Resistance at C2 SP)   Rationale: decrease pain, increase ROM and increase tissue extensibility to improve ease with checking blindspots while driving           With   [] TE   [] TA   [] neuro   [] other: Patient Education: [x] Review HEP    [] Progressed/Changed HEP based on:   [] positioning   [] body mechanics   [] transfers   [] heat/ice application    [] other:      Pain Level (0-10 scale) post treatment: 4    ASSESSMENT/Changes in Function: Patient is a LHD male who presents with insidious onset of left upper cervical pain 1 month prior to PT initial evaluation with patient denying any previous history of cervical discomfort but with PMH significant for Rheumatoid arthritis with involvement of multiple UE/LE joints. Patient denies any radicular pain but reports presence of left frontal headache. Patient denies changes in vision/hearing but does subjectively report some difficulty with swallowing, with onset of difficulty reported with onset of upper cervical pain. Secondary to RA patient reports chronic history of difficulty with self-care and household ADLs. Patient reports upon request of Dr Mellie Bernheim patient with consultation scheduled with rheumatologist with patient currently under the care of PCP for RA. Increase in discomfort reported with cervical rotation bilaterally and cervical extension with patient reporting associated joint noise and noted to demonstrate concern regarding presence of joint noise. Patient demonstrates a (-) cervical myelopathy screen with patient demonstrating a normal shoulder screen bilaterally. Patient noted observationally with flattening of the cervical lordosis and thoracic kyphosis with limited sagittal plane thoracic AROM demonstrated. Cervical AROM most limited in the directions of left/right sidebend and right rotation with patient noted with poor upper cervical mobility with most movement occurring at the cervicothoracic junction. Secondary to patient with PMH significant for RA with concern regarding upper cervical ligamentous stability with patient with questionable end-feel with assessment of the left alar ligament, concern regarding upper cervical stability. Patient noted with left upper cervical hypomobility with hypertonicity of the left upper cervical musculature. Emphasis of within clinic treatment to be placed on improving upper cervical musculature flexibility and joint mobility and thoracic mobility with concomitant strengthening of the deep cervical and cervicothoracic postural musculature to improve inherent stability. Will be cautious with assessment and treatment of upper cervical region secondary to RA. Patient will continue to benefit from skilled PT services to modify and progress therapeutic interventions, address functional mobility deficits, address ROM deficits, address strength deficits, analyze and address soft tissue restrictions, analyze and cue movement patterns, analyze and modify body mechanics/ergonomics, assess and modify postural abnormalities and address imbalance/dizziness to attain remaining goals. [x]  See Plan of Care  []  See progress note/recertification  []  See Discharge Summary         Progress towards goals / Updated goals:    Short Term Goals: To be accomplished in 3 weeks:  1. Patient will subjectively report full compliance with prescribed HEP. Eval: HEP provided  2. Patient will demonstrate right cervical rotation AROM >/=55 degrees to improve ease with checking blindspots while driving. Eval: Right Cervical Rotation = 42 degrees  3. Patient will demonstrate cervical extension >/=50 degrees with pain </=3/10 in order to improve ease with overhead ADLs. Eval: Cervical Extension AROM = 50 degrees (left upper cervical pain 8/10)    Long Term Goals: To be accomplished in 6 weeks:  1. Patient will demonstrate a significant functional improvement as demonstrated by a score of >/=55 on FOTO. Eval: FOTO = 39  2. Patient will report >/=60% improvement in sleep disturbances in order to improve overall quality of life. Eval: 0%  3. Patient will demonstrate a 30% improvement in thoracic extension AROM to improve ease with dressing.   Eval: Thoracic Extension AROM = 90% limited    PLAN  [x]  Upgrade activities as tolerated     []  Continue plan of care  []  Update interventions per flow sheet       []  Discharge due to:_  []  Other:_      Louise Crenshaw PT 6/12/2018  8:23 AM    Future Appointments  Date Time Provider Ruslan Nunez   6/12/2018 10:00 AM Louise Crenshaw PT [facility] 7/9/2018 10:15 AM Aliya Tiwari MD [facility]

## 2018-06-12 NOTE — PROGRESS NOTES
[facility]              [address]        [place], [address] [place]   [phone] fax    Plan of Care/ Statement of Necessity for Physical Therapy Services  Patient name: Faye Gabriel Start of Care: 2018   Referral source: Neftali Farnsworth MD : 1965    Medical Diagnosis: Cervicalgia [M54.2]  Spondylosis without myelopathy or radiculopathy, cervical region [M47.812]   Onset Date:1 month prior to IE    Treatment Diagnosis: Left Upper Cervical Hypomobility   Prior Hospitalization: see medical history Provider#: [id number]   Medications: Verified on Patient summary List    Comorbidities: Chronic Back Pain, RA, HTN, HA, Tobacco Use   Prior Level of Function: LHD, Does not work, (I) Functional ADLs, Chronic difficulties with self-care and household ADLs secondary to RA     The Plan of Care and following information is based on the information from the initial evaluation. Assessment:    Patient is a LHD male who presents with insidious onset of left upper cervical pain 1 month prior to PT initial evaluation with patient denying any previous history of cervical discomfort but with PMH significant for Rheumatoid arthritis with involvement of multiple UE/LE joints. Patient denies any radicular pain but reports presence of left frontal headache. Patient denies changes in vision/hearing but does subjectively report some difficulty with swallowing, with onset of difficulty reported with onset of upper cervical pain. Secondary to RA patient reports chronic history of difficulty with self-care and household ADLs. Patient reports upon request of Dr Jeanette Bourgeois patient with consultation scheduled with rheumatologist with patient currently under the care of PCP for RA.  Increase in discomfort reported with cervical rotation bilaterally and cervical extension with patient reporting associated joint noise and noted to demonstrate concern regarding presence of joint noise. Patient demonstrates a (-) cervical myelopathy screen with patient demonstrating a normal shoulder screen bilaterally. Patient noted observationally with flattening of the cervical lordosis and thoracic kyphosis with limited sagittal plane thoracic AROM demonstrated. Cervical AROM most limited in the directions of left/right sidebend and right rotation with patient noted with poor upper cervical mobility with most movement occurring at the cervicothoracic junction. Secondary to patient with PMH significant for RA with concern regarding upper cervical ligamentous stability with patient with questionable end-feel with assessment of the left alar ligament, concern regarding upper cervical stability. Patient noted with left upper cervical hypomobility with hypertonicity of the left upper cervical musculature. Emphasis of within clinic treatment to be placed on improving upper cervical musculature flexibility and joint mobility and thoracic mobility with concomitant strengthening of the deep cervical and cervicothoracic postural musculature to improve inherent stability. Will be cautious with assessment and treatment of upper cervical region secondary to RA.     Patient will continue to benefit from skilled PT services to modify and progress therapeutic interventions, address functional mobility deficits, address ROM deficits, address strength deficits, analyze and address soft tissue restrictions, analyze and cue movement patterns, analyze and modify body mechanics/ergonomics, assess and modify postural abnormalities and address imbalance/dizziness to attain remaining goals.     Key Information:    BP: 120/76 mmHg     Posture: Flattened thoracic kyphosis, Right scapular protraction/downward rotation, Flattened cervical lordosis, No change in symptoms with jaw opening     Cervical Myelopathy Screen: Rhomberg (EC) 30 sec, (-) Trinh     Shoulder/Scapular Screen: Patient without reproduction of pain with completion of the following shoulder AROM bilaterally: Flexion, Abduction, Functional ER, Functional IR     Active Movements: [] N/A   [] Too acute   [] Other:  ROM AROM Comments   Forward flexion 50 All motion at CTJ   Extension 50 Left upper cervical stabbing pain (8/10)   SB right 30 Left upper cervical pain   SB left  30 Left upper cervical pain   Rotation right 42 Left upper cervical \"pull\"   Rotation left 55 Left upper cervical pain      Thoracic Spine: Thoracic Rotation (Left 25% limited, Right 25% limited), Extension 90% limited, Flexion 75% limited [No pain reproduction with all thoracic AROM)     Palpation: No TTP to cervical SP and bilateral cervical paraspinal in sitting, TTP to left suboccipital musculature      Neurological Screen (myotome/dermatome/reflexes): [] WNL      Myotomal Strength (C4-T1): Symmetrical, normal strength bilaterally C4-T1     Cervical Special Tests:       Alar Ligament: Right (end-feel noted) / Left (unable to assess end-feel)       Transverse Ligament: Normal                                                                    Cervical Flexion Rotation Test: (+) Left / (+) Right     Joint Mobility Assessment         Cervical: Hypomobility left C2-C4 with lateral cervical side glide, Hypomobile left/right OA distraction    Evaluation Complexity History MEDIUM  Complexity : 1-2 comorbidities / personal factors will impact the outcome/ POC ; Examination MEDIUM Complexity : 3 Standardized tests and measures addressing body structure, function, activity limitation and / or participation in recreation  ;Presentation MEDIUM Complexity : Evolving with changing characteristics  ; Clinical Decision Making MEDIUM Complexity : FOTO score of 26-74  Overall Complexity Rating: MEDIUM  Problem List: pain affecting function, decrease ROM, decrease strength, decrease ADL/ functional abilitiies, decrease activity tolerance, decrease flexibility/ joint mobility and decrease transfer abilities   Treatment Plan may include any combination of the following: Therapeutic exercise, Therapeutic activities, Neuromuscular re-education, Physical agent/modality, Gait/balance training, Manual therapy, Patient education, Self Care training, Functional mobility training and Home safety training  Patient / Family readiness to learn indicated by: asking questions, trying to perform skills and interest  Persons(s) to be included in education: patient (P)  Barriers to Learning/Limitations: None  Patient Goal (s): I would love to get the grinding out of my neck  Patient Self Reported Health Status: fair  Rehabilitation Potential: good    Short Term Goals: To be accomplished in 3 weeks:  1. Patient will subjectively report full compliance with prescribed HEP. 2. Patient will demonstrate right cervical rotation AROM >/=55 degrees to improve ease with checking blindspots while driving. 3. Patient will demonstrate cervical extension >/=50 degrees with pain </=3/10 in order to improve ease with overhead ADLs. Long Term Goals: To be accomplished in 6 weeks:  1. Patient will demonstrate a significant functional improvement as demonstrated by a score of >/=55 on FOTO. 2. Patient will report >/=60% improvement in sleep disturbances in order to improve overall quality of life. 3. Patient will demonstrate a 30% improvement in thoracic extension AROM to improve ease with dressing,    Frequency / Duration: Patient to be seen 2 times per week for 6 weeks. Patient/ Caregiver education and instruction: Diagnosis, prognosis, self care, activity modification and exercises   [x]  Plan of care has been reviewed with MADISYN Rudd, PT 6/12/2018 8:23 AM  ________________________________________________________________________    I certify that the above Therapy Services are being furnished while the patient is under my care. I agree with the treatment plan and certify that this therapy is necessary.     [facility] Signature:____________________  Date:____________Time: _________    Please sign and return to [facility] [address], [facility] [place]   [phone] fax

## 2018-06-14 ENCOUNTER — HOSPITAL ENCOUNTER (OUTPATIENT)
Dept: PHYSICAL THERAPY | Age: 53
Discharge: HOME | End: 2018-06-14
Payer: MEDICAID

## 2018-06-14 PROCEDURE — 97110 THERAPEUTIC EXERCISES: CPT

## 2018-06-14 PROCEDURE — 97140 MANUAL THERAPY 1/> REGIONS: CPT

## 2018-06-14 NOTE — PROGRESS NOTES
PT DAILY TREATMENT NOTE - [facility]     Patient Name: Indigo Benson  Date:2018  : 1965  [x]  Patient  Verified  Payor: [facility] MEDICAID / Plan: [employer]O [employer] [facility] [employer] / Product Type: Managed Care Medicaid /    In time:9:59  Out time:10:35  Total Treatment Time (min): 36  Total Timed Codes (min): 36  1:1 Treatment Time ( only): 36   Visit #: 2 of 12    Treatment Area: Cervicalgia [M54.2]  Spondylosis without myelopathy or radiculopathy, cervical region [M47.812]    SUBJECTIVE  Pain Level (0-10 scale): 3  Any medication changes, allergies to medications, adverse drug reactions, diagnosis change, or new procedure performed?: [x] No    [] Yes (see summary sheet for update)  Subjective functional status/changes:   [] No changes reported  Pt reports that he feels that the home exercises have really been helping him.     OBJECTIVE        Min Type Additional Details    [] Estim:  []Unatt       []IFC  []Premod                        []Other:  []w/ice   []w/heat  Position:  Location:    [] Estim: []Att    []TENS instruct  []NMES                    []Other:  []w/US   []w/ice   []w/heat  Position:  Location:    []  Traction: [] Cervical       []Lumbar                       [] Prone          []Supine                       []Intermittent   []Continuous Lbs:  [] before manual  [] after manual    []  Ultrasound: []Continuous   [] Pulsed                           []1MHz   []3MHz W/cm2:  Location:    []  Iontophoresis with dexamethasone         Location: [] Take home patch   [] In clinic    []  Ice     []  heat  []  Ice massage  []  Laser   []  Anodyne Position:  Location:    []  Laser with stim  []  Other:  Position:  Location:    []  Vasopneumatic Device Pressure:       [] lo [] med [] hi   Temperature: [] lo [] med [] hi   [] Skin assessment post-treatment:  []intact []redness- no adverse reaction    []redness - adverse reaction:       28 min Therapeutic Exercise:  [x] See flow sheet :   Rationale: increase ROM and increase strength to improve the patients ability to increase tolerance to activities. 8 min Manual Therapy:  SOR, STM/TPR B cervical paraspinals, Left UT/LS,   Rationale: decrease pain, increase ROM, increase tissue extensibility and decrease trigger points to increase ease with ADLs. With   [] TE   [] TA   [] neuro   [] other: Patient Education: [x] Review HEP    [] Progressed/Changed HEP based on:   [] positioning   [] body mechanics   [] transfers   [] heat/ice application    [] other:      Other Objective/Functional Measures: Pt reports performing HEP. Pain Level (0-10 scale) post treatment: 2    ASSESSMENT/Changes in Function: Initiated exercises per POC. Patient will continue to benefit from skilled PT services to modify and progress therapeutic interventions, address functional mobility deficits, address ROM deficits, address strength deficits and analyze and address soft tissue restrictions to attain remaining goals. []  See Plan of Care  []  See progress note/recertification  []  See Discharge Summary         Progress towards goals / Updated goals:     Short Term Goals: To be accomplished in 3 weeks:  1. Patient will subjectively report full compliance with prescribed HEP. Eval: HEP provided  Current: Goal met: Pt reports performing HEP. 6/14/18  2. Patient will demonstrate right cervical rotation AROM >/=55 degrees to improve ease with checking blindspots while driving. Eval: Right Cervical Rotation = 42 degrees  3. Patient will demonstrate cervical extension >/=50 degrees with pain </=3/10 in order to improve ease with overhead ADLs. Eval: Cervical Extension AROM = 50 degrees (left upper cervical pain 8/10)     Long Term Goals: To be accomplished in 6 weeks:  1. Patient will demonstrate a significant functional improvement as demonstrated by a score of >/=55 on FOTO. Eval: FOTO = 39  2.  Patient will report >/=60% improvement in sleep disturbances in order to improve overall quality of life. Eval: 0%  3. Patient will demonstrate a 30% improvement in thoracic extension AROM to improve ease with dressing.   Eval: Thoracic Extension AROM = 90% limited       PLAN  []  Upgrade activities as tolerated     [x]  Continue plan of care  []  Update interventions per flow sheet       []  Discharge due to:_  []  Other:_      Michael Chicho, PTA 6/14/2018  10:02 AM    Future Appointments  Date Time Provider Ruslan Nunez   6/19/2018 8:30 AM Viveca Old, PT [facility]   6/26/2018 10:30 AM Michael Chicho, PTA [facility]   6/29/2018 11:30 AM Viveca Old, PT [facility]   7/3/2018 11:00 AM Michael Chicho, PTA [facility]   7/6/2018 11:00 AM Michael Chicho, PTA [facility]   7/9/2018 10:15 AM Paz Cisneros MD [facility]   7/10/2018 11:00 AM Michael Chicho, PTA [facility]   7/13/2018 11:00 AM Michael Chicho, PTA [facility]   7/17/2018 10:30 AM Michael Chicho, PTA [facility]   7/19/2018 10:30 AM Viveca Old, PT [facility]

## 2018-06-29 ENCOUNTER — APPOINTMENT (OUTPATIENT)
Dept: PHYSICAL THERAPY | Age: 53
End: 2018-06-29
Payer: MEDICAID

## 2018-07-03 ENCOUNTER — APPOINTMENT (OUTPATIENT)
Dept: PHYSICAL THERAPY | Age: 53
End: 2018-07-03

## 2018-07-06 ENCOUNTER — APPOINTMENT (OUTPATIENT)
Dept: PHYSICAL THERAPY | Age: 53
End: 2018-07-06

## 2018-07-10 ENCOUNTER — APPOINTMENT (OUTPATIENT)
Dept: PHYSICAL THERAPY | Age: 53
End: 2018-07-10

## 2018-07-12 NOTE — PROGRESS NOTES
[facility]              [address]        [place], [address]   [phone] fax    Discharge Summary  Patient name: Marybeth Joseph Start of Care: 2018   Referral source: Nika Hill MD : 1965   Medical/Treatment Diagnosis: Cervicalgia [M54.2]  Spondylosis without myelopathy or radiculopathy, cervical region [M47.812] Onset Date:1 month prior to IE     Prior Hospitalization: see medical history Provider#: [id number]   Medications: Verified on Patient Summary List    Comorbidities: Chronic Back Pain, RA, HTN, HA, Tobacco Use   Prior Level of Function: LHD, Does not work, (I) Functional ADLs, Chronic difficulties with self-care and household ADLs secondary to RA  Visits from Start of Care: 2    Missed Visits: 2  Reporting Period : 2018 to 2018    Summary of Care:    Short Term Goals: To be accomplished in 3 weeks:  1. Patient will subjectively report full compliance with prescribed HEP. Eval: HEP provided  At DC: Goal met: Pt reports performing HEP  2. Patient will demonstrate right cervical rotation AROM >/=55 degrees to improve ease with checking blindspots while driving. Eval: Right Cervical Rotation = 42 degrees  At DC: Inability to assess secondary to patient self-discharge  3. Patient will demonstrate cervical extension >/=50 degrees with pain </=3/10 in order to improve ease with overhead ADLs. Eval: Cervical Extension AROM = 50 degrees (left upper cervical pain 8/10)  At DC: Inability to assess secondary to patient [address] be accomplished in 6 weeks:  1. Patient will demonstrate a significant functional improvement as demonstrated by a score of >/=55 on FOTO. Eval: FOTO = 39  At DC: Inability to assess secondary to patient self-discharge  2. Patient will report >/=60% improvement in sleep disturbances in order to improve overall quality of life. Eval: 0%  At DC:  Inability to assess secondary to patient self-discharge  3. Patient will demonstrate a 30% improvement in thoracic extension AROM to improve ease with dressing. Eval: Thoracic Extension AROM = 90% limited  At DC: Inability to assess secondary to patient self-discharge    ASSESSMENT/RECOMMENDATIONS:    Patient with attendance to only one scheduled follow up treatment appointment after initial evaluation with patient reporting desire to cancel all remaining appointments prior to MD follow up 7/9/2018 with patient contact made 6/28/2018. Contacted the patient 7/9/2018 with patient with desire to cancel all remaining appointments secondary to severe pain associated with arthritis with patient reporting Rheumatologist appointment scheduled for August. Patient to be discharged at this time in accordance with patient request.    [x]Discontinue therapy: []Patient has reached or is progressing toward set goals      [x]Patient is non-compliant or has abdicated      []Due to lack of appreciable progress towards set goals    Mallory Torres, PT 7/12/2018 1:05 PM    NOTE TO PHYSICIAN:  Please complete the following and fax to: [facility] at [facility] at [phone]  . Retain this original for your records. If you are unable to process this request in   24 hours, please contact our office.      [] I have read the above report and request that my patient continue therapy with the following changes/special instructions:  [] I have read the above report and request that my patient be discharged from therapy    Physician's Signature:_____________________ Date:___________Time:__________

## 2018-07-13 ENCOUNTER — APPOINTMENT (OUTPATIENT)
Dept: PHYSICAL THERAPY | Age: 53
End: 2018-07-13

## 2018-07-17 ENCOUNTER — APPOINTMENT (OUTPATIENT)
Dept: PHYSICAL THERAPY | Age: 53
End: 2018-07-17

## 2018-07-19 ENCOUNTER — APPOINTMENT (OUTPATIENT)
Dept: PHYSICAL THERAPY | Age: 53
End: 2018-07-19

## 2018-08-28 ENCOUNTER — OFFICE VISIT (OUTPATIENT)
Dept: ORTHOPEDIC SURGERY | Age: 53
End: 2018-08-28

## 2018-08-28 VITALS
HEIGHT: 72 IN | BODY MASS INDEX: 27.01 KG/M2 | HEART RATE: 65 BPM | SYSTOLIC BLOOD PRESSURE: 127 MMHG | DIASTOLIC BLOOD PRESSURE: 77 MMHG | WEIGHT: 199.4 LBS | RESPIRATION RATE: 16 BRPM

## 2018-08-28 DIAGNOSIS — M06.9 RHEUMATOID ARTHRITIS INVOLVING MULTIPLE SITES, UNSPECIFIED RHEUMATOID FACTOR PRESENCE: ICD-10-CM

## 2018-08-28 DIAGNOSIS — M50.30 DDD (DEGENERATIVE DISC DISEASE), CERVICAL: ICD-10-CM

## 2018-08-28 DIAGNOSIS — M54.2 CERVICALGIA: ICD-10-CM

## 2018-08-28 DIAGNOSIS — M47.812 CERVICAL SPONDYLOSIS WITHOUT MYELOPATHY: Primary | ICD-10-CM

## 2018-08-28 RX ORDER — DICLOFENAC SODIUM 75 MG/1
TABLET, DELAYED RELEASE ORAL
COMMUNITY
Start: 2018-08-24 | End: 2019-10-03

## 2018-08-28 RX ORDER — SULFASALAZINE 500 MG/1
TABLET ORAL
COMMUNITY
Start: 2018-08-23 | End: 2019-10-03

## 2018-08-28 NOTE — MR AVS SNAPSHOT
[address] [facility] 
 
 
 [address] [facility] 
[phone] Patient: Nikki Smith MRN: [id number] [id number] Visit Information Date & Time Provider Department Dept. Phone Encounter #  
 8/28/2018  9:15 AM Sushma Heller MD [facility], [address] and [facility] [place] [phone] [id number] Follow-up Instructions Return in about 4 weeks (around 9/25/2018). Upcoming Health Maintenance Date Due Pneumococcal 19-64 Medium Risk (1 of 1 - PPSV23) 9/5/1982 DTaP/Tdap/Td series (1 - Tdap) 9/5/1984 FOBT Q 1 YEAR AGE 50-75 9/5/2013 Influenza Age 5 to Adult 8/1/2018 Allergies as of 8/28/2018  Review Complete On: 8/28/2018 By: Sushma Heller MD  
  
 Severity Noted Reaction Type Reactions Lisinopril High 10/03/2017    Anaphylaxis Patient experienced throat swelling and respiratory distress as a result. Current Immunizations  Never Reviewed No immunizations on file. Not reviewed this visit You Were Diagnosed With   
  
 Codes Comments Cervical spondylosis without myelopathy    -  Primary ICD-10-CM: Q65.940 ICD-9-CM: 721.0   
 DDD (degenerative disc disease), cervical     ICD-10-CM: M50.30 ICD-9-CM: 722.4 Rheumatoid arthritis involving multiple sites, unspecified rheumatoid factor presence ([facility]     ICD-10-CM: M06.9 ICD-9-CM: 714.0 Cervicalgia     ICD-10-CM: M54.2 ICD-9-CM: 723.1 Vitals BP Pulse Resp Height(growth percentile) Weight(growth percentile) BMI  
 127/77 65 16 6' (1.829 m) 199 lb 6.4 oz (90.4 kg) 27.04 kg/m2 Smoking Status Current Every Day Smoker BMI and BSA Data Body Mass Index Body Surface Area  
 27.04 kg/m 2 2.14 m 2 Preferred Pharmacy Pharmacy Name Phone [id number]o, [address] [phone] Your Updated Medication List  
  
   
 This list is accurate as of 8/28/18 10:09 AM.  Always use your most recent med list.  
  
  
  
  
 allopurinol 100 mg tablet Commonly known as:  Franny Kaisernett Take 2 Tabs by mouth daily. amLODIPine 10 mg tablet Commonly known as:  Deya Fraction Take 1 Tab by mouth daily. Indications: hypertension  
  
 cetirizine 10 mg tablet Commonly known as:  ZYRTEC  
  
 chlorthalidone 50 mg tablet Commonly known as:  Suzen Pay Take 1 Tab by mouth daily. citalopram 20 mg tablet Commonly known as:  Izetta Trev Take 1 Tab by mouth daily. Indications: GENERALIZED ANXIETY DISORDER  
  
 cyanocobalamin 500 mcg tablet Commonly known as:  VITAMIN B12 Take 1 Tab by mouth daily. cyclobenzaprine 10 mg tablet Commonly known as:  FLEXERIL Take 1 Tab by mouth three (3) times daily as needed for Muscle Spasm(s). diclofenac EC 75 mg EC tablet Commonly known as:  VOLTAREN  
  
 ergocalciferol 50,000 unit capsule Commonly known as:  ERGOCALCIFEROL Take 1 Cap by mouth every seven (7) days. FLUoxetine 20 mg tablet Commonly known as:  PROzac Take 1 Tab by mouth daily. fluticasone 50 mcg/actuation nasal spray Commonly known as:  FLONASE ALLERGY RELIEF  
2 Sprays by Both Nostrils route daily.  mg tablet Generic drug:  ibuprofen  
  
 loratadine 10 mg tablet Commonly known as:  Siobhan Soda Take 1 Tab by mouth daily. losartan 100 mg tablet Commonly known as:  COZAAR Take 1 Tab by mouth daily. meloxicam 15 mg tablet Commonly known as:  MOBIC Take 1 Tab by mouth daily. oxyCODONE-acetaminophen 5-325 mg per tablet Commonly known as:  PERCOCET Take 1 Tab by mouth every eight (8) hours as needed for Pain. Max Daily Amount: 3 Tabs. probenecid-colchicine 500-0.5 mg per tablet Commonly known as:  ColBenemid Take 1 Tab by mouth two (2) times a day. sildenafil citrate 50 mg tablet Commonly known as:  VIAGRA Take 1 Tab by mouth as needed. sulfaSALAzine 500 mg tablet Commonly known as:  AZULFIDINE  
  
 traMADol 50 mg tablet Commonly known as:  ULTRAM  
Use every 8 hours as needed for breakthrough pain Follow-up Instructions Return in about 4 weeks (around 9/25/2018). Introducing [facility] SERVICES! [employer] introduces [employer] patient portal. Now you can access parts of your medical record, email your doctor's office, and request medication refills online. 1. In your internet browser, go to [email or web address] 2. Click on the First Time User? Click Here link in the Sign In box. You will see the New Member Sign Up page. 3. Enter your Ent[employer] Access Code exactly as it appears below. You will not need to use this code after youve completed the sign-up process. If you do not sign up before the expiration date, you must request a new code. · Ent[employer] Access Code: [email or web address] Expires: 9/4/2018 10:02 AM 
 
4. Enter the last four digits of your Social Security Number (xxxx) and Date of Birth (mm/dd/yyyy) as indicated and click Submit. You will be taken to the next sign-up page. 5. Create a Entrecard ID. This will be your Entrecard login ID and cannot be changed, so think of one that is secure and easy to remember. 6. Create a [employer] password. You can change your password at any time. 7. Enter your Password Reset Question and Answer. This can be used at a later time if you forget your password. 8. Enter your e-mail address. You will receive e-mail notification when new information is available in [address] 9. Click Sign Up. You can now view and download portions of your medical record. 10. Click the Download Summary menu link to download a portable copy of your medical information. If you have questions, please visit the Frequently Asked Questions section of the [employer] website. Remember, [employer] is NOT to be used for urgent needs. For medical emergencies, dial 911. Now available from your iPhone and Android! Please provide this summary of care documentation to your next provider. Your primary care clinician is listed as Garrick Carcamo. If you have any questions after today's visit, please call [phone].

## 2018-08-28 NOTE — PROGRESS NOTES
[facility]  [address], [place]   Phone: [phone]  Fax: [phone]        PROGRESS NOTE      HISTORY OF PRESENT ILLNESS:  The patient is a 45 y.o. male and was seen today for follow up of neck pain. ER note dated 5/11/18 indicating patient presented for facial pain, pain behind left eye, left ear pain, muscle spasms of the neck and acute sinusitis. At that time, he was started on Flexeril, Flonase, Motrin and Zyrtec. Note from Dr. Lesli Cardona dated 5/24/18 indicating patient was seen with c/o neck pain and HA. At that time, she prescribed Meloxicam for neck pain and Amoxicillin for acute sinusitis. PMHx includes rheumatoid arthritis (has never been followed by rheumatology), depression, EtOH abuse. Pt denies h/o DM. The patient is LHD. Preliminary reading of cervical plain films revealed: nonspecific straightening of the normal cervical lordosis. Disc space narrowing at C5-6, C6-7. Small anterior osteophytes noted at C4-C7. No acute pathology identified. At his last clinical appointment, the patient presented for neck pain. Patient with multiple joint pain complaints and I referred him to Dr. Gilda Sotelo for management of his RA. I started him on Medrol Dosepak. I gave him a prescription for Meloxicam 15 mg qd x 2 weeks following completion of the Medrol Dosepak. I referred him to physical therapy with an emphasis on HEP. The patient returns today with pain location and distribution remain unchanged. He rates his pain 7/10, a slight decrease from his last visit (8/10). He reports completing his HEP daily. He completed PT with some transient benefit. He completed MDP with minimal relief. He reports some mild LOB. Note from Dr. Quyen Dc dated 8/23/18 indicating patient was seen with c/o RA by history. He had joint pain, swelling, deformity, decreased ROM, stiffness in the AM. RF and CCP are negative. XR was consistent with erosive changes.  She started patient on Diclofenac. She believed he did have RA. Therapy notes reviewed.  reviewed. Body mass index is 27.04 kg/(m^2). PCP: Rhett Cuellar MD      Past Medical History:   Diagnosis Date    Arthritis     Chronic pain     joints due to Rheumatoid Arthritis    Contact dermatitis and other eczema, due to unspecified cause     dry skin in scalp    Depression     Headache(784.0)     Hypertension     Rheumatoid arthritis(714.0) 1983    Rheumatoid arthritis(714.0)     Seasonal allergic rhinitis         Social History     Social History    Marital status:      Spouse name: N/A    Number of children: N/A    Years of education: N/A     Occupational History    Not on file. Social History Main Topics    Smoking status: Current Every Day Smoker     Packs/day: 1.00     Years: 25.00     Types: Cigarettes    Smokeless tobacco: Never Used    Alcohol use Yes      Comment: daily    Drug use: Yes     Special: OTC, Prescription    Sexual activity: Not on file     Other Topics Concern    Not on file     Social History Narrative       Current Outpatient Prescriptions   Medication Sig Dispense Refill    cetirizine (ZYRTEC) 10 mg tablet       sildenafil citrate (VIAGRA) 50 mg tablet Take 1 Tab by mouth as needed. 6 Tab 0    chlorthalidone (HYGROTEN) 50 mg tablet Take 1 Tab by mouth daily. 90 Tab 1    losartan (COZAAR) 100 mg tablet Take 1 Tab by mouth daily. 30 Tab 2    diclofenac EC (VOLTAREN) 75 mg EC tablet       sulfaSALAzine (AZULFIDINE) 500 mg tablet        mg tablet       meloxicam (MOBIC) 15 mg tablet Take 1 Tab by mouth daily. (Patient not taking: Reported on 8/28/2018) 15 Tab 0    loratadine (CLARITIN) 10 mg tablet Take 1 Tab by mouth daily. (Patient not taking: Reported on 8/28/2018) 90 Tab 1    cyclobenzaprine (FLEXERIL) 10 mg tablet Take 1 Tab by mouth three (3) times daily as needed for Muscle Spasm(s).  (Patient not taking: Reported on 8/28/2018) 90 Tab 1    amLODIPine (NORVASC) 10 mg tablet Take 1 Tab by mouth daily. Indications: hypertension (Patient not taking: Reported on 8/28/2018) 90 Tab 3    citalopram (CELEXA) 20 mg tablet Take 1 Tab by mouth daily. Indications: GENERALIZED ANXIETY DISORDER (Patient not taking: Reported on 8/28/2018) 30 Tab 0    cyanocobalamin (VITAMIN B12) 500 mcg tablet Take 1 Tab by mouth daily. (Patient not taking: Reported on 8/28/2018) 90 Tab 1    fluticasone (FLONASE ALLERGY RELIEF) 50 mcg/actuation nasal spray 2 Sprays by Both Nostrils route daily. (Patient not taking: Reported on 8/28/2018) 1 Bottle 3    traMADol (ULTRAM) 50 mg tablet Use every 8 hours as needed for breakthrough pain (Patient not taking: Reported on 8/28/2018) 30 Tab 0    FLUoxetine (PROZAC) 20 mg tablet Take 1 Tab by mouth daily. (Patient not taking: Reported on 8/28/2018) 20 Tab 0    ergocalciferol (ERGOCALCIFEROL) 50,000 unit capsule Take 1 Cap by mouth every seven (7) days. (Patient not taking: Reported on 8/28/2018) 4 Cap 2    colchicine-probenecid (COLBENEMID) 0.5-500 mg per tablet Take 1 Tab by mouth two (2) times a day. (Patient not taking: Reported on 8/28/2018) 60 Tab 0    allopurinol (ZYLOPRIM) 100 mg tablet Take 2 Tabs by mouth daily. (Patient not taking: Reported on 8/28/2018) 60 Tab 2    oxyCODONE-acetaminophen (PERCOCET) 5-325 mg per tablet Take 1 Tab by mouth every eight (8) hours as needed for Pain. Max Daily Amount: 3 Tabs. (Patient not taking: Reported on 8/28/2018) 30 Tab 0       Allergies   Allergen Reactions    Lisinopril Anaphylaxis     Patient experienced throat swelling and respiratory distress as a result. PHYSICAL EXAMINATION    Visit Vitals    /77    Pulse 65    Resp 16    Ht 6' (1.829 m)    Wt 90.4 kg (199 lb 6.4 oz)    BMI 27.04 kg/m2       CONSTITUTIONAL: NAD, A&O x 3  SENSATION: Intact to light touch throughout  NEURO: Norbert's is negative bilaterally.   RANGE OF MOTION: The patient has full passive range of motion in all four extremities. Tandem gait: negative     Shoulder AB/Flex Elbow Flex Wrist Ext Elbow Ext Wrist Flex Hand Intrin Tone   Right +4/5 +4/5 +4/5 +4/5 +4/5 +4/5 +4/5   Left +4/5 +4/5 +4/5 +4/5 +4/5 +4/5 +4/5                 ASSESSMENT   Diagnoses and all orders for this visit:    1. Cervical spondylosis without myelopathy    2. DDD (degenerative disc disease), cervical    3. Rheumatoid arthritis involving multiple sites, unspecified rheumatoid factor presence (HCC)    4. Cervicalgia          IMPRESSION AND PLAN:  I suspect at least a portion of his pain is related to his RA. After discussion with patient, further cervical spine treatment deferred at this time until it is clear how the patient's RA is contributing to his pain. He is starting Diclofenac and Azulfadine and has a f/u appointment with Dr. Martinez Wolff. Patient is neurologically intact. I recommend he continue with his HEP daily. I will see the patient back in 1 month's time or earlier if needed. Written by Marcus Cordero, as dictated by Roseanna Tuttle MD  I examined the patient, reviewed and agree with the note.

## 2018-10-26 ENCOUNTER — OFFICE VISIT (OUTPATIENT)
Dept: ORTHOPEDIC SURGERY | Age: 53
End: 2018-10-26

## 2018-10-26 VITALS
WEIGHT: 196 LBS | DIASTOLIC BLOOD PRESSURE: 87 MMHG | RESPIRATION RATE: 16 BRPM | HEART RATE: 82 BPM | HEIGHT: 72 IN | SYSTOLIC BLOOD PRESSURE: 146 MMHG | BODY MASS INDEX: 26.55 KG/M2

## 2018-10-26 DIAGNOSIS — M47.812 CERVICAL SPONDYLOSIS WITHOUT MYELOPATHY: Primary | ICD-10-CM

## 2018-10-26 DIAGNOSIS — M06.9 RHEUMATOID ARTHRITIS INVOLVING MULTIPLE SITES, UNSPECIFIED RHEUMATOID FACTOR PRESENCE: ICD-10-CM

## 2018-10-26 DIAGNOSIS — M50.30 DDD (DEGENERATIVE DISC DISEASE), CERVICAL: ICD-10-CM

## 2018-10-26 RX ORDER — AMOXICILLIN AND CLAVULANATE POTASSIUM 875; 125 MG/1; MG/1
1 TABLET, FILM COATED ORAL
COMMUNITY
End: 2019-10-03

## 2018-10-26 RX ORDER — OMEPRAZOLE 40 MG/1
CAPSULE, DELAYED RELEASE ORAL
COMMUNITY
Start: 2018-10-05 | End: 2019-10-03

## 2018-10-26 NOTE — PROGRESS NOTES
[facility]  [address], [place]   Phone: [phone]  Fax: [phone]        PROGRESS NOTE      HISTORY OF PRESENT ILLNESS:  The patient is a 52 y.o. male and was seen today for follow up of neck pain. ER note dated 5/11/18 indicating patient presented for facial pain, pain behind left eye, left ear pain, muscle spasms of the neck and acute sinusitis. At that time, he was started on Flexeril, Flonase, Motrin and Zyrtec. Note from Dr. Giselle Espinoza dated 5/24/18 indicating patient was seen with c/o neck pain and HA. At that time, she prescribed Meloxicam for neck pain and Amoxicillin for acute sinusitis. Note from Dr. Jerson Olivarez dated 8/23/18 indicating patient was seen with c/o RA by history. He had joint pain, swelling, deformity, decreased ROM, stiffness in the AM. RF and CCP are negative. XR was consistent with erosive changes. She started patient on Diclofenac. She believed he did have RA. He completed MDP with minimal relief. PMHx includes rheumatoid arthritis (has never been followed by rheumatology), depression, EtOH abuse. Pt denies h/o DM. He completed PT with some transient benefit. The patient is LHD. Preliminary reading of cervical plain films revealed: nonspecific straightening of the normal cervical lordosis. Disc space narrowing at C5-6, C6-7. Small anterior osteophytes noted at C4-C7. No acute pathology identified. At his last clinical appointment, I suspected at least a portion of his pain was related to his RA. After discussion with patient, further cervical spine treatment deferred at that time until it was clear how the patient's RA was contributing to his pain. He was starting Diclofenac and Azulfadine and had a f/u appointment with Dr. Jerson Olivarez. The patient returns today with pain location and distribution remain unchanged. He rates his pain 8/10, a slight increase from his last visit (7/10). His pain is exacerbated by all motions. Patient reports that he discontinued Diclofenac and Azulfadine as he found blood in his stool. He has a follow up appointment with Dr. Dee Dee Zamorano in 1 week. He reports completing his HEP daily.  reviewed. Body mass index is 26.58 kg/m². PCP: Evelyn Lewis MD      Past Medical History:   Diagnosis Date    Arthritis     Chronic pain     joints due to Rheumatoid Arthritis    Contact dermatitis and other eczema, due to unspecified cause     dry skin in scalp    Depression     Headache(784.0)     Hypertension     Rheumatoid arthritis(714.0) 1983    Rheumatoid arthritis(714.0)     Seasonal allergic rhinitis         Social History     Socioeconomic History    Marital status:      Spouse name: Not on file    Number of children: Not on file    Years of education: Not on file    Highest education level: Not on file   Social Needs    Financial resource strain: Not on file    Food insecurity - worry: Not on file    Food insecurity - inability: Not on file   [employer] needs - medical: Not on file   [employer] needs - non-medical: Not on file   Occupational History    Not on file   Tobacco Use    Smoking status: Current Every Day Smoker     Packs/day: 1.00     Years: 25.00     Pack years: 25.00     Types: Cigarettes    Smokeless tobacco: Never Used   Substance and Sexual Activity    Alcohol use: Yes     Comment: daily    Drug use: Yes     Types: OTC, Prescription    Sexual activity: Not on file   Other Topics Concern    Not on file   Social History Narrative    Not on file       Current Outpatient Medications   Medication Sig Dispense Refill    diclofenac EC (VOLTAREN) 75 mg EC tablet       sulfaSALAzine (AZULFIDINE) 500 mg tablet       cetirizine (ZYRTEC) 10 mg tablet        mg tablet       meloxicam (MOBIC) 15 mg tablet Take 1 Tab by mouth daily.  (Patient not taking: Reported on 8/28/2018) 15 Tab 0    loratadine (CLARITIN) 10 mg tablet Take 1 Tab by mouth daily. (Patient not taking: Reported on 8/28/2018) 90 Tab 1    cyclobenzaprine (FLEXERIL) 10 mg tablet Take 1 Tab by mouth three (3) times daily as needed for Muscle Spasm(s). (Patient not taking: Reported on 8/28/2018) 90 Tab 1    sildenafil citrate (VIAGRA) 50 mg tablet Take 1 Tab by mouth as needed. 6 Tab 0    amLODIPine (NORVASC) 10 mg tablet Take 1 Tab by mouth daily. Indications: hypertension (Patient not taking: Reported on 8/28/2018) 90 Tab 3    citalopram (CELEXA) 20 mg tablet Take 1 Tab by mouth daily. Indications: GENERALIZED ANXIETY DISORDER (Patient not taking: Reported on 8/28/2018) 30 Tab 0    cyanocobalamin (VITAMIN B12) 500 mcg tablet Take 1 Tab by mouth daily. (Patient not taking: Reported on 8/28/2018) 90 Tab 1    fluticasone (FLONASE ALLERGY RELIEF) 50 mcg/actuation nasal spray 2 Sprays by Both Nostrils route daily. (Patient not taking: Reported on 8/28/2018) 1 Bottle 3    traMADol (ULTRAM) 50 mg tablet Use every 8 hours as needed for breakthrough pain (Patient not taking: Reported on 8/28/2018) 30 Tab 0    chlorthalidone (HYGROTEN) 50 mg tablet Take 1 Tab by mouth daily. 90 Tab 1    FLUoxetine (PROZAC) 20 mg tablet Take 1 Tab by mouth daily. (Patient not taking: Reported on 8/28/2018) 20 Tab 0    ergocalciferol (ERGOCALCIFEROL) 50,000 unit capsule Take 1 Cap by mouth every seven (7) days. (Patient not taking: Reported on 8/28/2018) 4 Cap 2    colchicine-probenecid (COLBENEMID) 0.5-500 mg per tablet Take 1 Tab by mouth two (2) times a day. (Patient not taking: Reported on 8/28/2018) 60 Tab 0    allopurinol (ZYLOPRIM) 100 mg tablet Take 2 Tabs by mouth daily. (Patient not taking: Reported on 8/28/2018) 60 Tab 2    oxyCODONE-acetaminophen (PERCOCET) 5-325 mg per tablet Take 1 Tab by mouth every eight (8) hours as needed for Pain. Max Daily Amount: 3 Tabs. (Patient not taking: Reported on 8/28/2018) 30 Tab 0    losartan (COZAAR) 100 mg tablet Take 1 Tab by mouth daily.  30 Tab 2 Allergies   Allergen Reactions    Lisinopril Anaphylaxis     Patient experienced throat swelling and respiratory distress as a result. PHYSICAL EXAMINATION    Visit Vitals  /87   Pulse 82   Resp 16   Ht 6' (1.829 m)   Wt 196 lb (88.9 kg)   BMI 26.58 kg/m²       CONSTITUTIONAL: NAD, A&O x 3  SENSATION: Intact to light touch throughout  NEURO: Norbert's is negative bilaterally. RANGE OF MOTION: The patient has full passive range of motion in all four extremities. Shoulder AB/Flex Elbow Flex Wrist Ext Elbow Ext Wrist Flex Hand Intrin Tone   Right +4/5 +4/5 +4/5 +4/5 +4/5 +4/5 +4/5   Left +4/5 +4/5 +4/5 +4/5 +4/5 +4/5 +4/5                 ASSESSMENT   Diagnoses and all orders for this visit:    1. Cervical spondylosis without myelopathy    2. DDD (degenerative disc disease), cervical    3. Rheumatoid arthritis involving multiple sites, unspecified rheumatoid factor presence ([facility] [address]          IMPRESSION AND PLAN:  My sense is his symptoms are multifactorial in nature, with some related to spinal pathology and some related to rheumatoid arthritis. He has essentially failed conservative treatment for the cervical spine. Patient is not interested in surgical intervention or a referral to pain management at this time. It is noted that he has discontinued his medications for his RA due to side effects. He should f/u with Dr. Eugenie Rossi as scheduled for treatment of his rheumatoid arthritis. Patient is neurologically intact. I recommend he continue with his HEP daily. I will see the patient back prn. Written by Heri Silver, as dictated by Adriana Choi MD  I examined the patient, reviewed and agree with the note.

## 2019-03-08 ENCOUNTER — TELEPHONE (OUTPATIENT)
Dept: ORTHOPEDIC SURGERY | Age: 54
End: 2019-03-08

## 2019-03-08 NOTE — TELEPHONE ENCOUNTER
Patient called and is requesting a copy of the Xray that was taken on his Neck on 10/26/2018 at the [facility] Location by Pranav Alanis. Would like to  at the [facility] location. Patient tel. [phone].

## 2019-03-08 NOTE — TELEPHONE ENCOUNTER
Called patient and informed him that all he needed to do to get his xray on a disk. He needs to come to our [facility] office and sign a medical release form and rad tech will be able to print it out for him. Patient verbalized understanding.

## 2021-04-27 NOTE — PROGRESS NOTES
OFFICE NOTE    Carmen Vincent is a 60 y.o. male presenting today for the following:  Chief Complaint   Patient presents with    Establish Care    Gout          HPI   Patient is being seen today to establish care as a new patient. Hypertension  Patient is currently on amlodipine 10 mg daily. Today his blood pressure was WNL. Patient states he does not check his blood pressure at home. Educated patient on why he should monitor his blood pressures at home. Denies chest pain, vision changes, or headaches. He denies any side effects to this medication and take as prescribed. DDD/Rheumatoid arthritis  Patient was being followed by Dr. Attila Monreal and he stop seeing her awhile ago. He now would like to get establish with a orthopedic provider due to his RA that he have throughout his body. He states his joints stay swollen and painful. Will refer to orthopedics. Gout  He is currently on allopurinol 300 mg daily, colchicine 0.6 mg daily. Patient presents today with right foot swelling. Patient states he takes those medications daily for his gout. However his right foot have been swollen intermittently now its been a year that he have been swollen in the right foot. Will also do a right foot xray. Unsure if right foot swelling is due to Gout because patient have had this swelling in his foot for months. Will order prednisone to help with inflammation. Advised patient to use tylenol for discomfort. Will further workup once xray results are received. Prostate Cancer   Patient is being followed by Dr. Vannesa Magallanes. He is taking Viagra for Impotence that is managed by Dr. Vannesa Magallanes. Previous had a PSA/MAXIMILIANO check that was normal.  It is recommended that he follow up again in 1 year for PSA/MAXIMILIANO. Patient denies any abnormal urinary symptoms. Order lipid panel separate from today for patient to come back and have done. Requested Jenae Burris (GORAN) to call patient to schedule lab for lipid panel.        3 most recent PHQ Screens 5/2/2014   Little interest or pleasure in doing things Nearly every day   Feeling down, depressed, irritable, or hopeless More than half the days   Total Score PHQ 2 5   Trouble falling or staying asleep, or sleeping too much Not at all   Feeling tired or having little energy Nearly every day   Poor appetite, weight loss, or overeating Nearly every day   Feeling bad about yourself - or that you are a failure or have let yourself or your family down Not at all   Trouble concentrating on things such as school, work, reading, or watching TV Not at all   Moving or speaking so slowly that other people could have noticed; or the opposite being so fidgety that others notice Not at all   Thoughts of being better off dead, or hurting yourself in some way Not at all   How difficult have these problems made it for you to do your work, take care of your home and get along with others Somewhat difficult           HM  Colon cancer screening - ordered today  Pneumococcal - will think about it  tdap - will think about it  shingrix - will think about it        Review of Systems   Constitutional: Negative for fatigue and fever. HENT: Negative. Eyes: Negative. Respiratory: Negative for cough, chest tightness, shortness of breath and wheezing. Cardiovascular: Negative for chest pain and palpitations. Musculoskeletal:        Right foot swelling. Generalized muscle aches   Neurological: Negative for dizziness, light-headedness and headaches.          History  Past Medical History:   Diagnosis Date    Arthritis     Chronic pain     joints due to Rheumatoid Arthritis    Contact dermatitis and other eczema, due to unspecified cause     dry skin in scalp    Depression     Headache(784.0)     Hypertension     Rheumatoid arthritis(714.0) 1983    Rheumatoid arthritis(714.0)     Seasonal allergic rhinitis        Past Surgical History:   Procedure Laterality Date    HX CYST REMOVAL  1983    large cyst removed on left shoulder       Social History     Socioeconomic History    Marital status:      Spouse name: Not on file    Number of children: Not on file    Years of education: Not on file    Highest education level: Not on file   Occupational History    Not on file   Social Needs    Financial resource strain: Not on file    Food insecurity     Worry: Not on file     Inability: Not on file    Transportation needs     Medical: Not on file     Non-medical: Not on file   Tobacco Use    Smoking status: Current Every Day Smoker     Packs/day: 1.00     Years: 25.00     Pack years: 25.00     Types: Cigarettes    Smokeless tobacco: Never Used   Substance and Sexual Activity    Alcohol use: Yes     Comment: occasionally    Drug use: Not Currently     Types: OTC, Prescription    Sexual activity: Yes     Partners: Female   Lifestyle    Physical activity     Days per week: Not on file     Minutes per session: Not on file    Stress: Not on file   Relationships    Social connections     Talks on phone: Not on file     Gets together: Not on file     Attends Yazdanism service: Not on file     Active member of club or organization: Not on file     Attends meetings of clubs or organizations: Not on file     Relationship status: Not on file    Intimate partner violence     Fear of current or ex partner: Not on file     Emotionally abused: Not on file     Physically abused: Not on file     Forced sexual activity: Not on file   Other Topics Concern    Not on file   Social History Narrative    Not on file       Allergies   Allergen Reactions    Lisinopril Anaphylaxis     Patient experienced throat swelling and respiratory distress as a result. Current Outpatient Medications   Medication Sig Dispense Refill    doxycycline (MONODOX) 100 mg capsule Take 100 mg by mouth two (2) times a day.       colchicine 0.6 mg tablet       FeroSul 325 mg (65 mg iron) tablet       fluticasone propionate (FLONASE) 50 mcg/actuation nasal spray 2 Sprays by Both Nostrils route daily. 1 Bottle 5    predniSONE (DELTASONE) 20 mg tablet Take 3 tablets daily by mouth for 3 days, then take 2 tablets daily by mouth for 3 days, then take 1 tablet daily by mouth for 3 days. 18 Tab 0    allopurinoL (ZYLOPRIM) 300 mg tablet       sildenafil citrate (VIAGRA) 100 mg tablet TAKE ONE TABLET BY MOUTH ONE HOUR PRIOR TO SEXUAL ACTIVITY - MAXIMUM DAILY DOSE: 100MG 30 Tab 6    hydroCHLOROthiazide (HYDRODIURIL) 25 mg tablet       cetirizine (ZYRTEC) 10 mg tablet       amLODIPine (NORVASC) 10 mg tablet Take 1 Tab by mouth daily. Indications: hypertension 90 Tab 3         Patient Care Team:  Patient Care Team:  Thien Ovalles NP as PCP - General (Nurse Practitioner)  Spenser Perez MD as Physician (Physical Medicine and Rehabilitation)  Aide Quiroz MD as Physician (Rheumatology)      LABS:  No results found for any visits on 04/28/21. RADIOLOGY:  No recent results      Physical Exam  Vitals signs and nursing note reviewed. Constitutional:       Appearance: He is well-developed. Eyes:      Pupils: Pupils are equal, round, and reactive to light. Neck:      Musculoskeletal: Normal range of motion and neck supple. Cardiovascular:      Rate and Rhythm: Normal rate and regular rhythm. Heart sounds: Normal heart sounds. Pulmonary:      Effort: Pulmonary effort is normal. No respiratory distress. Breath sounds: Normal breath sounds. No wheezing or rales. Abdominal:      General: Bowel sounds are normal.      Palpations: Abdomen is soft. Musculoskeletal: Normal range of motion. General: Swelling (mild non pitting edema to right upper foot.) present. Lymphadenopathy:      Cervical: No cervical adenopathy. Skin:     General: Skin is warm and dry. Neurological:      Mental Status: He is alert and oriented to person, place, and time.       Deep Tendon Reflexes: Reflexes are normal and symmetric. Psychiatric:         Mood and Affect: Mood normal.           Vitals:    04/28/21 0848   BP: 128/78   Pulse: 80   Resp: 24   Temp: 97.3 °F (36.3 °C)   TempSrc: Oral   SpO2: 98%   Weight: 185 lb (83.9 kg)   Height: 6' (1.829 m)   PainSc:   8   PainLoc: Foot         Assessment and Plan    1. Acute idiopathic gout of right foot    - URIC ACID; Future  - predniSONE (DELTASONE) 20 mg tablet; Take 3 tablets daily by mouth for 3 days, then take 2 tablets daily by mouth for 3 days, then take 1 tablet daily by mouth for 3 days. Dispense: 18 Tab; Refill: 0  - URIC ACID  - COLLECTION VENOUS BLOOD,VENIPUNCTURE    2. Allergy, initial encounter    - fluticasone propionate (FLONASE) 50 mcg/actuation nasal spray; 2 Sprays by Both Nostrils route daily. Dispense: 1 Bottle; Refill: 5    3. Arthritis    - REFERRAL TO ORTHOPEDICS    4. Localized swelling of right foot    - XR FOOT RT MIN 3 V; Future  - COLLECTION VENOUS BLOOD,VENIPUNCTURE    5. Screening for colon cancer    - COLOGUARD TEST (FECAL DNA COLORECTAL CANCER SCREENING)    6. Screening for cardiovascular condition    - LIPID PANEL; Future    7. Screening for endocrine, metabolic and immunity disorder    - CBC W/O DIFF; Future  - METABOLIC PANEL, COMPREHENSIVE; Future  - HEMOGLOBIN A1C WITH EAG; Future  - HEMOGLOBIN A1C WITH EAG  - METABOLIC PANEL, COMPREHENSIVE  - CBC W/O DIFF  - COLLECTION VENOUS BLOOD,VENIPUNCTURE      MDM    Procedures      *Plan of care reviewed with patient. Patient in agreement with plan and expresses understanding. All questions answered and patient encouraged to call or RTO if further questions or concerns. Advised patient if symptoms worsen to go to nearest ER or call 911. AVS and recommendations given to patient upon discharge.

## 2021-04-28 ENCOUNTER — HOSPITAL ENCOUNTER (OUTPATIENT)
Dept: LAB | Age: 56
Discharge: HOME | End: 2021-04-28

## 2021-04-28 ENCOUNTER — OFFICE VISIT (OUTPATIENT)
Dept: FAMILY MEDICINE CLINIC | Age: 56
End: 2021-04-28
Payer: MEDICAID

## 2021-04-28 VITALS
RESPIRATION RATE: 24 BRPM | BODY MASS INDEX: 25.06 KG/M2 | OXYGEN SATURATION: 98 % | HEART RATE: 80 BPM | HEIGHT: 72 IN | WEIGHT: 185 LBS | SYSTOLIC BLOOD PRESSURE: 128 MMHG | TEMPERATURE: 97.3 F | DIASTOLIC BLOOD PRESSURE: 78 MMHG

## 2021-04-28 DIAGNOSIS — M19.90 ARTHRITIS: ICD-10-CM

## 2021-04-28 DIAGNOSIS — Z13.6 SCREENING FOR CARDIOVASCULAR CONDITION: ICD-10-CM

## 2021-04-28 DIAGNOSIS — M10.071 ACUTE IDIOPATHIC GOUT OF RIGHT FOOT: ICD-10-CM

## 2021-04-28 DIAGNOSIS — Z12.11 SCREENING FOR COLON CANCER: ICD-10-CM

## 2021-04-28 DIAGNOSIS — Z13.29 SCREENING FOR ENDOCRINE, METABOLIC AND IMMUNITY DISORDER: ICD-10-CM

## 2021-04-28 DIAGNOSIS — Z13.228 SCREENING FOR ENDOCRINE, METABOLIC AND IMMUNITY DISORDER: ICD-10-CM

## 2021-04-28 DIAGNOSIS — Z13.0 SCREENING FOR ENDOCRINE, METABOLIC AND IMMUNITY DISORDER: ICD-10-CM

## 2021-04-28 DIAGNOSIS — T78.40XA ALLERGY, INITIAL ENCOUNTER: ICD-10-CM

## 2021-04-28 DIAGNOSIS — R22.41 LOCALIZED SWELLING OF RIGHT FOOT: ICD-10-CM

## 2021-04-28 LAB — XX-LABCORP SPECIMEN COL,LCBCF: NORMAL

## 2021-04-28 PROCEDURE — 99203 OFFICE O/P NEW LOW 30 MIN: CPT | Performed by: NURSE PRACTITIONER

## 2021-04-28 PROCEDURE — 36415 COLL VENOUS BLD VENIPUNCTURE: CPT | Performed by: NURSE PRACTITIONER

## 2021-04-28 PROCEDURE — 99001 SPECIMEN HANDLING PT-LAB: CPT

## 2021-04-28 RX ORDER — FERROUS SULFATE TAB 325 MG (65 MG ELEMENTAL FE) 325 (65 FE) MG
TAB ORAL
COMMUNITY
Start: 2021-03-24 | End: 2021-10-20 | Stop reason: SDUPTHER

## 2021-04-28 RX ORDER — DOXYCYCLINE 100 MG/1
100 CAPSULE ORAL 2 TIMES DAILY
COMMUNITY

## 2021-04-28 RX ORDER — PREDNISONE 20 MG/1
TABLET ORAL
Qty: 18 TAB | Refills: 0 | Status: SHIPPED | OUTPATIENT
Start: 2021-04-28 | End: 2021-07-29 | Stop reason: ALTCHOICE

## 2021-04-28 RX ORDER — FLUTICASONE PROPIONATE 50 MCG
2 SPRAY, SUSPENSION (ML) NASAL DAILY
Qty: 1 BOTTLE | Refills: 5 | Status: SHIPPED | OUTPATIENT
Start: 2021-04-28 | End: 2021-07-29 | Stop reason: SDUPTHER

## 2021-04-28 RX ORDER — FLUTICASONE PROPIONATE 50 MCG
SPRAY, SUSPENSION (ML) NASAL
Qty: 1 BOTTLE | Status: CANCELLED | OUTPATIENT
Start: 2021-04-28

## 2021-04-28 RX ORDER — COLCHICINE 0.6 MG/1
TABLET ORAL
COMMUNITY
Start: 2021-04-02 | End: 2021-07-29 | Stop reason: SDUPTHER

## 2021-04-28 NOTE — Clinical Note
Please call patient and schedule a lab visit to come back for lipid fasting. Didn't do today because he wasn't fasting. Did the rest because he needed results.

## 2021-04-28 NOTE — PROGRESS NOTES
Bert Rocha presents today for   Chief Complaint   Patient presents with    Establish Care    Gout       Is someone accompanying this pt? no    Is the patient using any DME equipment during OV? no    Depression Screening:  3 most recent PHQ Screens 5/2/2014   Little interest or pleasure in doing things Nearly every day   Feeling down, depressed, irritable, or hopeless More than half the days   Total Score PHQ 2 5   Trouble falling or staying asleep, or sleeping too much Not at all   Feeling tired or having little energy Nearly every day   Poor appetite, weight loss, or overeating Nearly every day   Feeling bad about yourself - or that you are a failure or have let yourself or your family down Not at all   Trouble concentrating on things such as school, work, reading, or watching TV Not at all   Moving or speaking so slowly that other people could have noticed; or the opposite being so fidgety that others notice Not at all   Thoughts of being better off dead, or hurting yourself in some way Not at all   How difficult have these problems made it for you to do your work, take care of your home and get along with others Somewhat difficult       Learning Assessment:  Learning Assessment 5/2/2014   PRIMARY LEARNER Patient   HIGHEST LEVEL OF EDUCATION - PRIMARY LEARNER  GRADUATED HIGH SCHOOL OR GED   BARRIERS PRIMARY LEARNER NONE   CO-LEARNER CAREGIVER No   PRIMARY LANGUAGE ENGLISH   LEARNER PREFERENCE PRIMARY DEMONSTRATION     LISTENING     PICTURES     READING     VIDEOS   ANSWERED BY patient   RELATIONSHIP SELF       Abuse Screening:  Abuse Screening Questionnaire 5/2/2014   Do you ever feel afraid of your partner? N   Are you in a relationship with someone who physically or mentally threatens you? N   Is it safe for you to go home? Y       Fall Risk  Fall Risk Assessment, last 12 mths 5/2/2014   Able to walk? Yes   Fall in past 12 months?  No       Health Maintenance reviewed and discussed and ordered per Provider. Health Maintenance Due   Topic Date Due    Pneumococcal 0-64 years (1 of 1 - PPSV23) Never done    COVID-19 Vaccine (1) Never done    DTaP/Tdap/Td series (1 - Tdap) Never done    Shingrix Vaccine Age 50> (1 of 2) Never done    Colorectal Cancer Screening Combo  Never done   . Coordination of Care:  1. Have you been to the ER, urgent care clinic since your last visit? Hospitalized since your last visit? no    2. Have you seen or consulted any other health care providers outside of the [address] [place] [address] since your last visit? Include any pap smears or colon screening.  no      Last  Checked no  Last UDS Checked no  Last Pain contract signed: no    Health maintenance issues addressed patient is aware of all care gaps needed

## 2021-04-28 NOTE — PATIENT INSTRUCTIONS
Gout: Care Instructions Overview Gout is a form of arthritis caused by a buildup of uric acid crystals in a joint. It causes sudden attacks of pain, swelling, redness, and stiffness, usually in one joint, especially the big toe. Gout usually comes on without a cause. But it can be brought on by drinking alcohol (especially beer), eating or drinking things made with high-fructose corn syrup, or eating seafood or red meat. Taking certain medicines, such as diuretics, can also trigger an attack of gout. Taking your medicines as prescribed and following up with your doctor regularly can help you avoid gout attacks in the future. Follow-up care is a key part of your treatment and safety. Be sure to make and go to all appointments, and call your doctor if you are having problems. It's also a good idea to know your test results and keep a list of the medicines you take. How can you care for yourself at home? · If the joint is swollen, put ice or a cold pack on the area for 10 to 20 minutes at a time. Put a thin cloth between the ice and your skin. · Prop up the sore limb on a pillow when you ice it or anytime you sit or lie down during the next 3 days. Try to keep it above the level of your heart. This can help reduce swelling. · Rest sore joints. Avoid activities that put weight or strain on the joints for a few days. Take short rest breaks from your regular activities during the day. · Take your medicines exactly as prescribed. Call your doctor if you think you are having a problem with your medicine. · Take pain medicines exactly as directed. ? If the doctor gave you a prescription medicine for pain, take it as prescribed. ? If you are not taking a prescription pain medicine, ask your doctor if you can take an over-the-counter medicine. · Eat less seafood and red meat. · Avoid foods or drinks that are made with high-fructose corn syrup. · Check with your doctor before drinking alcohol.  
· Losing weight, if you are overweight, may help reduce attacks of gout. But do not go on a diet that causes rapid weight loss. Losing a lot of weight in a short amount of time can cause a gout attack. When should you call for help? Call your doctor now or seek immediate medical care if: 
  · You have a fever.  
  · The joint is so painful you cannot use it.  
  · You have sudden, unexplained swelling, redness, warmth, or severe pain in one or more joints. Watch closely for changes in your health, and be sure to contact your doctor if: 
  · You have joint pain.  
  · Your symptoms get worse or are not improving after 2 or 3 days. Where can you learn more? Go to http://www.gray.com/ Enter A300 in the search box to learn more about \"Gout: Care Instructions. \" Current as of: August 5, 2020               Content Version: 12.8 © 7630-7439 [employer]. Care instructions adapted under license by [employer] (which disclaims liability or warranty for this information). If you have questions about a medical condition or this instruction, always ask your healthcare professional. [address] any warranty or liability for your use of this information.

## 2021-04-28 NOTE — PROGRESS NOTES
Patient presents for lab draw ordered by Dr Anibal Swenson NP  Ordering Department/Practice:  [facility]  Date Ordered:  4-    The following labs were drawn and sent to [facility]     CBC, CMP, HgA1C and Uric Acid    The following tubes were sent:    Gold  ( 1) and Lavender  ( 1)    Draw site:  RAC  Pain Level:0  Needle Gauge23  Aseptic technique used  Blood thinners:n  Band-Aid applied  Draw fee added   Procedure tolerated well, patient voiced no complaints.

## 2021-04-29 LAB
ALBUMIN SERPL-MCNC: 4.1 G/DL (ref 3.8–4.9)
ALBUMIN/GLOB SERPL: 1.1 {RATIO} (ref 1.2–2.2)
ALP SERPL-CCNC: 96 IU/L (ref 39–117)
ALT SERPL-CCNC: 9 IU/L (ref 0–44)
AST SERPL-CCNC: 15 IU/L (ref 0–40)
BILIRUB SERPL-MCNC: 0.3 MG/DL (ref 0–1.2)
BUN SERPL-MCNC: 9 MG/DL (ref 6–24)
BUN/CREAT SERPL: 13 (ref 9–20)
CALCIUM SERPL-MCNC: 9.7 MG/DL (ref 8.7–10.2)
CHLORIDE SERPL-SCNC: 100 MMOL/L (ref 96–106)
CO2 SERPL-SCNC: 23 MMOL/L (ref 20–29)
CREAT SERPL-MCNC: 0.69 MG/DL (ref 0.76–1.27)
CREATININE, EXTERNAL: 0.69
ERYTHROCYTE [DISTWIDTH] IN BLOOD BY AUTOMATED COUNT: 17.2 % (ref 11.6–15.4)
EST. AVERAGE GLUCOSE BLD GHB EST-MCNC: 85 MG/DL
GLOBULIN SER CALC-MCNC: 3.6 G/DL (ref 1.5–4.5)
GLUCOSE SERPL-MCNC: 97 MG/DL (ref 65–99)
HBA1C MFR BLD HPLC: 4.6 %
HBA1C MFR BLD: 4.6 % (ref 4.8–5.6)
HCT VFR BLD AUTO: 34.5 % (ref 37.5–51)
HGB BLD-MCNC: 11.4 G/DL (ref 13–17.7)
MCH RBC QN AUTO: 27.2 PG (ref 26.6–33)
MCHC RBC AUTO-ENTMCNC: 33 G/DL (ref 31.5–35.7)
MCV RBC AUTO: 82 FL (ref 79–97)
PLATELET # BLD AUTO: 318 X10E3/UL (ref 150–450)
POTASSIUM SERPL-SCNC: 3.7 MMOL/L (ref 3.5–5.2)
PROT SERPL-MCNC: 7.7 G/DL (ref 6–8.5)
RBC # BLD AUTO: 4.19 X10E6/UL (ref 4.14–5.8)
SODIUM SERPL-SCNC: 137 MMOL/L (ref 134–144)
URATE SERPL-MCNC: 4.8 MG/DL (ref 3.8–8.4)
WBC # BLD AUTO: 9.7 X10E3/UL (ref 3.4–10.8)

## 2021-04-29 NOTE — PROGRESS NOTES
Please inform patient of uric acid being WNL. Hemoglobin A1C is 4.6 (not diabetes)    Inform patient that his xray of his the right foot is likely due to his arthritis/RH. It is suggested he see ortho and referral was placed on day of visit. Showing signs of anemia will order patient to take a iron supplementation. Will prescribe and inform to increase water intake and fiber to prevent constipation.

## 2021-04-29 NOTE — PROGRESS NOTES
Luciano kim patient is already on iron pills. Advise patient to also increase foods high in iron in his meals.

## 2021-05-07 NOTE — TELEPHONE ENCOUNTER
Patient called requesting an increase on his gout medication-explained his lab results especially uric acid was WNL,  May want to wait to see what ortho says-has an appt scheduled on 5-. Patient also requesting a refill on his iron pills.

## 2021-05-11 ENCOUNTER — LAB ONLY (OUTPATIENT)
Dept: FAMILY MEDICINE CLINIC | Age: 56
End: 2021-05-11
Payer: MEDICAID

## 2021-05-11 DIAGNOSIS — Z13.29 SCREENING FOR ENDOCRINE, METABOLIC AND IMMUNITY DISORDER: ICD-10-CM

## 2021-05-11 DIAGNOSIS — Z13.0 SCREENING FOR ENDOCRINE, METABOLIC AND IMMUNITY DISORDER: ICD-10-CM

## 2021-05-11 DIAGNOSIS — Z13.228 SCREENING FOR ENDOCRINE, METABOLIC AND IMMUNITY DISORDER: ICD-10-CM

## 2021-05-11 DIAGNOSIS — Z01.89 ENCOUNTER FOR LABORATORY EXAMINATION: Primary | ICD-10-CM

## 2021-05-11 PROCEDURE — 36415 COLL VENOUS BLD VENIPUNCTURE: CPT | Performed by: NURSE PRACTITIONER

## 2021-05-11 NOTE — PROGRESS NOTES
Patient presents for lab draw ordered by Dr Dilcia Peña, LYNDA  Ordering Department/Practice:  [facility]  Date Ordered:  [phone]     The following labs were drawn and sent to [address] [place] [address]    The following tubes were sent:    Gold  ( 1)    Draw site:  RAC  Pain Level:0  Needle Gauge23  Aseptic technique used  Blood thinners:n  Band-Aid applied  Draw fee added   Procedure tolerated well, patient voiced no complaints.

## 2021-05-12 LAB
CHOLEST SERPL-MCNC: 137 MG/DL (ref 100–199)
HDLC SERPL-MCNC: 70 MG/DL
IMP & REVIEW OF LAB RESULTS: NORMAL
LDL-C, EXTERNAL: 50
LDLC SERPL CALC-MCNC: 50 MG/DL (ref 0–99)
TRIGL SERPL-MCNC: 90 MG/DL (ref 0–149)
VLDLC SERPL CALC-MCNC: 17 MG/DL (ref 5–40)

## 2021-05-12 RX ORDER — FERROUS SULFATE TAB 325 MG (65 MG ELEMENTAL FE) 325 (65 FE) MG
TAB ORAL
OUTPATIENT
Start: 2021-05-12

## 2021-05-12 RX ORDER — COLCHICINE 0.6 MG/1
TABLET ORAL
OUTPATIENT
Start: 2021-05-12

## 2021-05-14 ENCOUNTER — OFFICE VISIT (OUTPATIENT)
Dept: ORTHOPEDIC SURGERY | Age: 56
End: 2021-05-14
Payer: MEDICAID

## 2021-05-14 VITALS — BODY MASS INDEX: 25.06 KG/M2 | HEIGHT: 72 IN | WEIGHT: 185 LBS

## 2021-05-14 DIAGNOSIS — M25.474 EFFUSION OF RIGHT FOOT: Primary | ICD-10-CM

## 2021-05-14 DIAGNOSIS — M19.071 ARTHRITIS OF RIGHT ANKLE: ICD-10-CM

## 2021-05-14 PROCEDURE — 20606 DRAIN/INJ JOINT/BURSA W/US: CPT | Performed by: ORTHOPAEDIC SURGERY

## 2021-05-14 PROCEDURE — 99203 OFFICE O/P NEW LOW 30 MIN: CPT | Performed by: ORTHOPAEDIC SURGERY

## 2021-05-14 RX ORDER — DOXYCYCLINE 100 MG/1
CAPSULE ORAL
COMMUNITY
Start: 2021-04-29 | End: 2022-06-15

## 2021-05-14 RX ORDER — LIDOCAINE HYDROCHLORIDE 10 MG/ML
1 INJECTION INFILTRATION; PERINEURAL ONCE
Status: COMPLETED | OUTPATIENT
Start: 2021-05-14 | End: 2021-05-14

## 2021-05-14 RX ORDER — TRIAMCINOLONE ACETONIDE 40 MG/ML
40 INJECTION, SUSPENSION INTRA-ARTICULAR; INTRAMUSCULAR ONCE
Status: COMPLETED | OUTPATIENT
Start: 2021-05-14 | End: 2021-05-14

## 2021-05-14 RX ORDER — LANOLIN ALCOHOL/MO/W.PET/CERES
CREAM (GRAM) TOPICAL
COMMUNITY
Start: 2021-02-09 | End: 2022-01-27 | Stop reason: SDUPTHER

## 2021-05-14 RX ADMIN — TRIAMCINOLONE ACETONIDE 40 MG: 40 INJECTION, SUSPENSION INTRA-ARTICULAR; INTRAMUSCULAR at 15:12

## 2021-05-14 RX ADMIN — LIDOCAINE HYDROCHLORIDE 1 ML: 10 INJECTION INFILTRATION; PERINEURAL at 15:11

## 2021-05-14 NOTE — LETTER
Gait: Norm  Limp  [address] [place]   Chair   Conrad Babb [facility]:[birth year]    Today's Date:5/14/2021 K:[id number]                                                Shoulder/Elbow Est  2  3  4 New  2  3   Consult  3    Pre-op         Post-op     No LOS Injection Utrasound     Injection NO Ultrasound    Medication 03902 L      85389       Cortisone [address] [address]       78609 SHOULDER 
LT Pain  M25.512  RT Pain   M25.511 LT Frozen  M75.02  RT Frozen   M75.01   
LT Partial RTC M75.112  RT Partial  RTC  M75.111 LT RTC tear  M75.122  RT RTC tear   M75.121 LT Bursitis  M75.52  RT Bursitis   M75.51 LT OA   M19.012  RT OA   M19.011 LT recurrent dislocation M24.412  RT recurrent dislocation  M24.411 LT SLAP  S43.432A  RT SLAP  R25.354Y SHOULDER FX 
Clavicle       24841  LT Clavicle    S42.025A RT Clavicle   S42.024A Humerus     55863   LT 2part   S42.225A RT 2part     S42.224A Supracondylar  63806 LT  S42.415A RT      S42.41        
 
                                                       ELBOW 
LT Pain                          M25.522  RT Pain    M25.521 LT Golfers elbow       M77.02  RT Golfers elbow   M77.01   
LT lateral epicondylitis    M77.12  RT lateral epicondylitis M77.11 LT olecranon bursitis      M70.22  RT olecranon bursitis  M70.21 LT ulnar nerve palsy      G56.22  RT ulnar nerve palsy  G56.21           
                                                       ELBOW FX Olecranon FX       [id number]  LT Olecranon  S52.035A RT Olecranon   S52.034A Radial head/neck  88501  LT Radial Head S52.125A RT Radial head  S52.124A LT Radial Neck  S52.135A RT Radial Neck S52.134A Splinting 90343- Long Arm Splint 69564- Short Arm Splint

## 2021-05-14 NOTE — LETTER
Gait: Norm  Limp  [address] [place]   Chair   Joliech Prashanth Babb    Today's Date:2021 JWB:[id number] Ankle/Foot/Toe Est  2  3  4 New  2  3   Consult  3    Pre-op         Post-op     No LOS Injection Utrasound                Injection NO Ultrasound                  Medication 13375 M                19663    Cortisone ANKLE 
LT effusion  M25.472  RT effusion  M25.471 LT sprain  S93.402A  RT sprain  S93.401A 
LT OA              M19.072  RT OA              M19.071 ANKLE FX Bimalleolar FX      [id number]    L T bimalleolar   [id number]   RT bimalleolar     [id number] Lateral Malleolus   [id number]     LT lat mal         S82.65XA  RT lat mal            S82.64XA Medial Malleolus   24335 FOOT 
LT effusion  M25.475  RT effusion  M25.474 LT sprain  S93.602A  RT sprain  S93.601A FOOT FX Metatarsal FX   82815   LT 1st   S92.315A RT 1st    S92.314A   
    LT 2nd  S92.325A RT 2nd    S92.324A LT 3rd  S92.335A RT 3rd  S92.334A LT 4th  S92.345A RT 4th  S92.344A   
    LT 5th   S92.355A RT 5th  S92.354A [facility] Toe FX [id number]    Lessor Toe FX [id number] Prox Toe   LT great    S92.415A  RT great S92.414A LT lesser   S92.515A  RT lesser S92.514A Middle Phlnx   LT  lesser  S92.525A  RT lesser S92.524A Distal Phlnx   LT great    S92.425A  RT great S92.424A LT lesser   S92.535A  RT lesser S92.534A 
93929-afaba taping toes  29515-short leg splint    29505-long leg splint

## 2021-05-14 NOTE — PROGRESS NOTES
Name: Parveen Donahue    : 1965     Service Dept: [address] [facility]    Patient's Pharmacies:    [facility] [address] [facility], [facility]  [address]  [address] [place] [address]  Phone: [phone] Fax: [phone]       Chief Complaint   Patient presents with    Elbow Pain    Knee Pain        Visit Vitals  Ht 6' (1.829 m)   Wt 185 lb (83.9 kg)   BMI 25.09 kg/m²      Allergies   Allergen Reactions    Lisinopril Anaphylaxis     Patient experienced throat swelling and respiratory distress as a result. Current Outpatient Medications   Medication Sig Dispense Refill    ferrous sulfate 325 mg (65 mg iron) tablet       doxycycline (VIBRAMYCIN) 100 mg capsule       doxycycline (MONODOX) 100 mg capsule Take 100 mg by mouth two (2) times a day.  colchicine 0.6 mg tablet       FeroSul 325 mg (65 mg iron) tablet       fluticasone propionate (FLONASE) 50 mcg/actuation nasal spray 2 Sprays by Both Nostrils route daily. 1 Bottle 5    predniSONE (DELTASONE) 20 mg tablet Take 3 tablets daily by mouth for 3 days, then take 2 tablets daily by mouth for 3 days, then take 1 tablet daily by mouth for 3 days. 18 Tab 0    allopurinoL (ZYLOPRIM) 300 mg tablet       sildenafil citrate (VIAGRA) 100 mg tablet TAKE ONE TABLET BY MOUTH ONE HOUR PRIOR TO SEXUAL ACTIVITY - MAXIMUM DAILY DOSE: 100MG 30 Tab 6    hydroCHLOROthiazide (HYDRODIURIL) 25 mg tablet       cetirizine (ZYRTEC) 10 mg tablet       amLODIPine (NORVASC) 10 mg tablet Take 1 Tab by mouth daily.  Indications: hypertension 90 Tab 3      Patient Active Problem List   Diagnosis Code    HTN (hypertension) I10    Current smoker F17.200    Alcoholism ([address] F10.20    Allergic rhinitis J30.9    Depression F32.9    Rheumatoid arthritis(714.0) M06.9    Hyperuricemia E79.0    Anxiety F41.9    Cervical spondylosis without myelopathy M47.812    DDD (degenerative disc disease), cervical M50.30    Rheumatoid arthritis involving multiple sites ([facility] [address] M06.9    Cervicalgia M54.2      Family History   Problem Relation Age of Onset    Emphysema Mother    Jarret Abad Arthritis-rheumatoid Mother     Emphysema Father     Arthritis-rheumatoid Father     Cancer Sister 35        breast    Liver Disease Brother     Thyroid Disease Brother       Social History     Socioeconomic History    Marital status:      Spouse name: Not on file    Number of children: Not on file    Years of education: Not on file    Highest education level: Not on file   Tobacco Use    Smoking status: Current Every Day Smoker     Packs/day: 1.00     Years: 25.00     Pack years: 25.00     Types: Cigarettes    Smokeless tobacco: Never Used   Substance and Sexual Activity    Alcohol use: Yes     Comment: occasionally    Drug use: Not Currently     Types: OTC, Prescription    Sexual activity: Yes     Partners: Female      Past Surgical History:   Procedure Laterality Date    HX CYST REMOVAL  1983    large cyst removed on left shoulder      Past Medical History:   Diagnosis Date    Arthritis     Chronic pain     joints due to Rheumatoid Arthritis    Contact dermatitis and other eczema, due to unspecified cause     dry skin in scalp    Depression     Headache(784.0)     Hypertension     Rheumatoid arthritis(714.0) 1983    Rheumatoid arthritis(714.0)     Seasonal allergic rhinitis         I have reviewed and agree with PFS and ROS and intake form in chart and the record furthermore I have reviewed prior medical record(s) regarding this patients care during this appointment. Review of Systems:   Patient is a pleasant appearing individual, appropriately dressed, well hydrated, well nourished, who is alert, appropriately oriented for age, and in no acute distress with a normal gait and normal affect who does not appear to be in any significant pain.    Physical Exam:  Right Ankle- Grossly neurovascularly intact with good cap refill, decreased range of motion, decreased strength, positive crepitation, minimal swelling, skin intact with no skin lesions, no erythema, no echymosis, no point tenderness. Left Ankle - No point tenderness, Full range of motion, No instability, No Weakness, No, skin lesions, No swelling, No instability, Grossly neurovascularly intact. Procedure Documentation:    I discussed in detail the risks, benefits and complications of an injection which included but are not limited to infection, skin reactions, hot swollen joint, and anaphylaxis with the patient. The patient verbalized understanding and gave informed consent for the injection. The patient's right ankle was prepped using sterile alcohol solution. A sterile needle was inserted into the right ankle and the mixture of 1 mL Lidocaine 1%, 1 mL Kenalog 40 mg was injected under sterile technique. The needle was withdrawn and the puncture site sealed with a Band-Aid. Technique: Under sterile conditions a [employer] ultrasound unit with a variable frequency (7.0-14.0 MHz) linear transducer was used to localize the placement of needle into the right ankle joint. Findings: Successful needle placement for ankle injection. Final images were taken and saved for permanent record. The patient tolerated the injection well. The patient was instructed to call the office immediately if there is any pain, redness, warmth, fever, or chills. Encounter Diagnoses     ICD-10-CM ICD-9-CM   1. Effusion of right foot  M25.474 719.07   2. Arthritis of right ankle  M19.071 716.97       HPI:  The patient is here with a chief complaint of right ankle pain, sharp, throbbing pain. Continues to have difficulty. Pain is 8/10. ROS:  10-point review of systems is positive for nighttime pain. X-rays are positive for moderate ankle OA. Assessment/Plan:  1. Right ankle OA. Plan will be for cortisone injection. If it helps, it is all we need to do.   We will see the patient back in 2 weeks for routine followup. As part of continued conservative pain management options the patient was advised to utilize Tylenol or OTC NSAIDS as long as it is not medically contraindicated. Return to Office: Follow-up and Dispositions    · Return in about 2 weeks (around 5/28/2021). Administrations This Visit     lidocaine (XYLOCAINE) 10 mg/mL (1 %) injection 1 mL     Admin Date  05/14/2021 Action  Given Dose  1 mL Route  Other Administered By  Savi Edward LPN          triamcinolone acetonide (KENALOG-40) 40 mg/mL injection 40 mg     Admin Date  05/14/2021 Action  Given Dose  40 mg Route  Other Administered By  Savi Edward LPN               Scribed by Yuriy Harry LPN as dictated by [facility] [place] ADAM Garcia MD.  Documentation True and Accepted Mason Garcia MD

## 2021-05-14 NOTE — LETTER
Sims Councilman  
1965  
[id number] 5/14/2021 I hereby authorize and direct Mason Kaiser MD, Alireza Stearns, and whomever he may designate as his associate to perform upon myself the following procedure: 
 
Injection of: Kenalog, Supartz, Euflexxa, Orthovisc in the Right/Left ____________________. If any unforeseen condition arises in the course of the procedure, I further authorize him and his associated and/or assistant(s) to do whatever he/she deems advisable. The nature, purpose, benefits, risks, side effects, likelihood of achieving goals, and potential problems that might occur during recuperation, risks for not receiving the proposed care, treatment and services and alternatives of the procedure have been fully explained to me by my physician including, but not limited to: 
 
Swelling, joint pain, skin pigment changes, worsening of condition, and failure to improve. I acknowledge that no guarantee or assurance has been made to me as to the results that may be obtained or the likelihood of success. _______________________________________ Signature of patient or authorized representative [employer] [facility] fax: [phone]

## 2021-05-14 NOTE — LETTER
Gait: Norm  Limp  [address] [place]   Chair   Joliech Prashanth Babb  XTC:7/0/[birth year]41     Today's Date:5/14/2021 [id number]                                                 KNEE/LEG Est  2  3  4 New  2  3   Consult  3    Pre-op         Post-op     No LOS Injection Utrasound  Injection NO Ultrasound [address] [facility] Sk[facility] KNEE 
LT Pain   M25.562             RT Pain         M25.561 LT OA     M17.12             RT OA             M17.11 LT ACL           S83.512A             RT ACL       S83.511A  
LT Chondromalacia   M22.42                   RT Chondromalacia    M22.41   
LT Internal derangement M23.92             RT Internal derangement   M23.91  
LT Loose Body        M23.42            RT Loose Body          M23.41 
LT OCD        M93.262            RT OCD       M93.261 LT lateral meniscus  S83.262A            RT lateral meniscus     S83.261A  
LT MCL       S83.412A            RT MCL       S83.411A   
LT medial menisus      S83.222A  RT medial menisus    S83.221A      
LT prepatellar bursitiS M70.42  RT prepatellar bursitis       M70.41 LT Quad tendon rupture  [id number] RT Quad tendon rupture   [id number] KNEE FRACTURE Knee FX [id number]     KneeCap FX 42435     Fibula FX   [id number] LT FX lateral condyle    S82.125A  RT FX lateral condyle     S82.124A LT FX medial condyle    S82.135A  RT FX medial condyle     S82.134A LT FX tibial spine            S82.115A  RT FX tibial spine     S82.114A LT FX transverse patella S82.035A  RT FX transverse patella S82.034A LEG FRACTURE Tibia shaft FX   [id number]      LT tibia shaft  S83.202A RT Tibia shaft  S83.201A      
LT Cellulitis L03. 116 RT cellulitis L03.115 Edema    R50.9 Splinting 
90385- Long Leg Splint 51676- Short Leg Splint

## 2021-05-14 NOTE — PATIENT INSTRUCTIONS
Ankle: Exercises Introduction Here are some examples of exercises for you to try. The exercises may be suggested for a condition or for rehabilitation. Start each exercise slowly. Ease off the exercises if you start to have pain. You will be told when to start these exercises and which ones will work best for you. How to do the exercises 'Alphabet' exercise 1. Trace the alphabet with your toe. This helps your ankle move in all directions. Side-to-side knee swing exercise 1. Sit in a chair with your foot flat on the floor. 2. Slowly move your knee from side to side while keeping your foot pressed flat. 3. Continue this exercise for 2 to 3 minutes. Towel curl 1. While sitting, place your foot on a towel on the floor and scrunch the towel toward you with your toes. 2. Then use your toes to push the towel away from you. 3. Make this exercise more challenging by placing a weighted object, such as a soup can, on the other end of the towel. Towel stretch 1. Sit with your legs extended and knees straight. 2. Place a towel around your foot just under the toes. 3. Hold each end of the towel in each hand, with your hands above your knees. 4. Pull back with the towel so that your foot stretches toward you. 5. Hold the position for at least 15 to 30 seconds. 6. Repeat 2 to 4 times a session, up to 5 sessions a day. Ankle eversion exercise 1. Start by sitting with your foot flat on the floor and pushing it outward against an immovable object such as the wall or heavy furniture. Hold for about 6 seconds, then relax. Repeat 8 to 12 times. 2. After you feel comfortable with this, try using rubber tubing looped around the outside of your feet for resistance. Push your foot out to the side against the tubing, and then count to 10 as you slowly bring your foot back to the middle. Repeat 8 to 12 times. Isometric opposition exercises 1.  While sitting, put your feet together flat on the floor. 
2. Press your injured foot inward against your other foot. Hold for about 6 seconds, and relax. Repeat 8 to 12 times. 3. Then place the heel of your other foot on top of the injured one. Push down with the top heel while trying to push up with your injured foot. Hold for about 6 seconds, and relax. Repeat 8 to 12 times. Follow-up care is a key part of your treatment and safety. Be sure to make and go to all appointments, and call your doctor if you are having problems. It's also a good idea to know your test results and keep a list of the medicines you take. Where can you learn more? Go to http://www.gray.com/ Enter L237 in the search box to learn more about \"Ankle: Exercises. \" Current as of: November 16, 2020               Content Version: 12.8 © 0303-0201 [employer]. Care instructions adapted under license by [employer] (which disclaims liability or warranty for this information). If you have questions about a medical condition or this instruction, always ask your healthcare professional. [address] any warranty or liability for your use of this information.

## 2021-05-18 DIAGNOSIS — T78.40XA ALLERGY, INITIAL ENCOUNTER: ICD-10-CM

## 2021-05-18 NOTE — TELEPHONE ENCOUNTER
Requested Prescriptions     Pending Prescriptions Disp Refills    fluticasone propionate (FLONASE) 50 mcg/actuation nasal spray 1 Bottle 5     Si Sprays by Both Nostrils route daily.     ferrous sulfate 325 mg (65 mg iron) tablet

## 2021-05-18 NOTE — TELEPHONE ENCOUNTER
Please see refill request    Patient last seen on    Patient does not need the Flonase refilled    Ferrous Sulfate last filled by [facility]    Thank you

## 2021-05-19 RX ORDER — LANOLIN ALCOHOL/MO/W.PET/CERES
CREAM (GRAM) TOPICAL
OUTPATIENT
Start: 2021-05-19

## 2021-05-19 RX ORDER — FLUTICASONE PROPIONATE 50 MCG
2 SPRAY, SUSPENSION (ML) NASAL DAILY
Qty: 1 BOTTLE | Refills: 5 | OUTPATIENT
Start: 2021-05-19

## 2021-05-28 ENCOUNTER — OFFICE VISIT (OUTPATIENT)
Dept: ORTHOPEDIC SURGERY | Age: 56
End: 2021-05-28
Payer: MEDICAID

## 2021-05-28 DIAGNOSIS — M19.071 ARTHRITIS OF RIGHT ANKLE: Primary | ICD-10-CM

## 2021-05-28 PROCEDURE — 99213 OFFICE O/P EST LOW 20 MIN: CPT | Performed by: ORTHOPAEDIC SURGERY

## 2021-05-28 NOTE — PROGRESS NOTES
Name: Margarita Swenson    : 1965     Service Dept: [address] [place] [address] Orthopaedics and Sports Medicine    Patient's Pharmacies:    [address], [address] [place] [address]  [address]  [address] [place] [address] [place] [id number]  Phone: [phone] Fax: [phone]       Chief Complaint   Patient presents with    Ankle Pain        There were no vitals taken for this visit. Allergies   Allergen Reactions    Lisinopril Anaphylaxis     Patient experienced throat swelling and respiratory distress as a result. Current Outpatient Medications   Medication Sig Dispense Refill    ferrous sulfate 325 mg (65 mg iron) tablet       doxycycline (VIBRAMYCIN) 100 mg capsule       doxycycline (MONODOX) 100 mg capsule Take 100 mg by mouth two (2) times a day.  colchicine 0.6 mg tablet       FeroSul 325 mg (65 mg iron) tablet       fluticasone propionate (FLONASE) 50 mcg/actuation nasal spray 2 Sprays by Both Nostrils route daily. 1 Bottle 5    predniSONE (DELTASONE) 20 mg tablet Take 3 tablets daily by mouth for 3 days, then take 2 tablets daily by mouth for 3 days, then take 1 tablet daily by mouth for 3 days. 18 Tab 0    allopurinoL (ZYLOPRIM) 300 mg tablet       sildenafil citrate (VIAGRA) 100 mg tablet TAKE ONE TABLET BY MOUTH ONE HOUR PRIOR TO SEXUAL ACTIVITY - MAXIMUM DAILY DOSE: 100MG 30 Tab 6    hydroCHLOROthiazide (HYDRODIURIL) 25 mg tablet       cetirizine (ZYRTEC) 10 mg tablet       amLODIPine (NORVASC) 10 mg tablet Take 1 Tab by mouth daily.  Indications: hypertension 90 Tab 3      Patient Active Problem List   Diagnosis Code    HTN (hypertension) I10    Current smoker F17.200    Alcoholism ([address] F10.20    Allergic rhinitis J30.9    Depression F32.9    Rheumatoid arthritis(714.0) M06.9    Hyperuricemia E79.0    Anxiety F41.9    Cervical spondylosis without myelopathy M47.812    DDD (degenerative disc disease), cervical M50.30    Rheumatoid arthritis involving multiple sites ([facility] M06.9    Cervicalgia M54.2      Family History   Problem Relation Age of Onset    Emphysema Mother    Loree Vásquez Arthritis-rheumatoid Mother     Emphysema Father     Arthritis-rheumatoid Father     Cancer Sister 35        breast    Liver Disease Brother     Thyroid Disease Brother       Social History     Socioeconomic History    Marital status:      Spouse name: Not on file    Number of children: Not on file    Years of education: Not on file    Highest education level: Not on file   Tobacco Use    Smoking status: Current Every Day Smoker     Packs/day: 1.00     Years: 25.00     Pack years: 25.00     Types: Cigarettes    Smokeless tobacco: Never Used   Vaping Use    Vaping Use: Never assessed   Substance and Sexual Activity    Alcohol use: Yes     Comment: occasionally    Drug use: Not Currently     Types: OTC, Prescription    Sexual activity: Yes     Partners: Female     Social Determinants of Health     Financial Resource Strain:     Difficulty of Paying Living Expenses:    Food Insecurity:     Worried About Running Out of Food in the Last Year:     Ran Out of Food in the Last Year:    Transportation Needs:     Lack of Transportation (Medical):      Lack of Transportation (Non-Medical):    Physical Activity:     Days of Exercise per Week:     Minutes of Exercise per Session:    Stress:     Feeling of Stress :    Social Connections:     Frequency of Communication with Friends and Family:     Frequency of Social Gatherings with Friends and Family:     Attends Congregational Services:     Active Member of Clubs or Organizations:     Attends Club or Organization Meetings:     Marital Status:       Past Surgical History:   Procedure Laterality Date    HX CYST REMOVAL  1983    large cyst removed on left shoulder      Past Medical History:   Diagnosis Date    Arthritis     Chronic pain     joints due to Rheumatoid Arthritis    Contact dermatitis and other eczema, due to unspecified cause     dry skin in scalp    Depression     Headache(784.0)     Hypertension     Rheumatoid arthritis(714.0) 1983    Rheumatoid arthritis(714.0)     Seasonal allergic rhinitis         I have reviewed and agree with PFSH and ROS and intake form in chart and the record furthermore I have reviewed prior medical record(s) regarding this patients care during this appointment. Review of Systems:   Patient is a pleasant appearing individual, appropriately dressed, well hydrated, well nourished, who is alert, appropriately oriented for age, and in no acute distress with a normal gait and normal affect who does not appear to be in any significant pain. Physical Exam:  Right Ankle- Grossly neurovascularly intact with good cap refill, decreased range of motion, decreased strength, positive crepitation, minimal swelling, skin intact with no skin lesions, no erythema, no echymosis, no point tenderness. Left Ankle - No point tenderness, Full range of motion, No instability, No Weakness, No, skin lesions, No swelling, No instability, Grossly neurovascularly intact. Encounter Diagnoses     ICD-10-CM ICD-9-CM   1. Arthritis of right ankle  M19.071 716.97       HPI:  The patient is here with a chief complaint of right foot pain, sharp, throbbing pain. It has been the same. Pain is 8/10. Post injection feeling better but not completely resolved. X-rays of the right ankle have been positive for arthritic changes. Assessment/Plan:  Plan at this point, activities as tolerated started. No restrictions. We will see the patient back as needed and go from there. As part of continued conservative pain management options the patient was advised to utilize Tylenol or OTC NSAIDS as long as it is not medically contraindicated. Return to Office: Follow-up and Dispositions    · Return for we will call. Scribed by Carli Curiel LPN as dictated by [facility] ADAM Hassan MD.  Documentation True and Accepted Mason Velez MD

## 2021-05-28 NOTE — PATIENT INSTRUCTIONS
Ankle: Exercises Introduction Here are some examples of exercises for you to try. The exercises may be suggested for a condition or for rehabilitation. Start each exercise slowly. Ease off the exercises if you start to have pain. You will be told when to start these exercises and which ones will work best for you. How to do the exercises 'Alphabet' exercise 1. Trace the alphabet with your toe. This helps your ankle move in all directions. Side-to-side knee swing exercise 1. Sit in a chair with your foot flat on the floor. 2. Slowly move your knee from side to side while keeping your foot pressed flat. 3. Continue this exercise for 2 to 3 minutes. Towel curl 1. While sitting, place your foot on a towel on the floor and scrunch the towel toward you with your toes. 2. Then use your toes to push the towel away from you. 3. Make this exercise more challenging by placing a weighted object, such as a soup can, on the other end of the towel. Towel stretch 1. Sit with your legs extended and knees straight. 2. Place a towel around your foot just under the toes. 3. Hold each end of the towel in each hand, with your hands above your knees. 4. Pull back with the towel so that your foot stretches toward you. 5. Hold the position for at least 15 to 30 seconds. 6. Repeat 2 to 4 times a session, up to 5 sessions a day. Ankle eversion exercise 1. Start by sitting with your foot flat on the floor and pushing it outward against an immovable object such as the wall or heavy furniture. Hold for about 6 seconds, then relax. Repeat 8 to 12 times. 2. After you feel comfortable with this, try using rubber tubing looped around the outside of your feet for resistance. Push your foot out to the side against the tubing, and then count to 10 as you slowly bring your foot back to the middle. Repeat 8 to 12 times. Isometric opposition exercises 1.  While sitting, put your feet together flat on the floor. 
2. Press your injured foot inward against your other foot. Hold for about 6 seconds, and relax. Repeat 8 to 12 times. 3. Then place the heel of your other foot on top of the injured one. Push down with the top heel while trying to push up with your injured foot. Hold for about 6 seconds, and relax. Repeat 8 to 12 times. Follow-up care is a key part of your treatment and safety. Be sure to make and go to all appointments, and call your doctor if you are having problems. It's also a good idea to know your test results and keep a list of the medicines you take. Where can you learn more? Go to http://www.gray.com/ Enter I785 in the search box to learn more about \"Ankle: Exercises. \" Current as of: November 16, 2020               Content Version: 12.8 © 8118-6170 [employer]. Care instructions adapted under license by [employer] (which disclaims liability or warranty for this information). If you have questions about a medical condition or this instruction, always ask your healthcare professional. [address] any warranty or liability for your use of this information.

## 2021-06-07 ENCOUNTER — OFFICE VISIT (OUTPATIENT)
Dept: ORTHOPEDIC SURGERY | Age: 56
End: 2021-06-07
Payer: MEDICAID

## 2021-06-07 VITALS
HEIGHT: 72 IN | WEIGHT: 188 LBS | HEART RATE: 72 BPM | BODY MASS INDEX: 25.47 KG/M2 | RESPIRATION RATE: 15 BRPM | OXYGEN SATURATION: 100 %

## 2021-06-07 DIAGNOSIS — M19.071 PRIMARY OSTEOARTHRITIS OF RIGHT ANKLE: ICD-10-CM

## 2021-06-07 DIAGNOSIS — M19.071 PRIMARY OSTEOARTHRITIS OF RIGHT FOOT: ICD-10-CM

## 2021-06-07 DIAGNOSIS — G89.29 CHRONIC PAIN OF RIGHT ANKLE: Primary | ICD-10-CM

## 2021-06-07 DIAGNOSIS — M25.571 CHRONIC PAIN OF RIGHT ANKLE: Primary | ICD-10-CM

## 2021-06-07 PROCEDURE — 73610 X-RAY EXAM OF ANKLE: CPT | Performed by: ORTHOPAEDIC SURGERY

## 2021-06-07 PROCEDURE — 99213 OFFICE O/P EST LOW 20 MIN: CPT | Performed by: ORTHOPAEDIC SURGERY

## 2021-06-07 RX ORDER — TRAMADOL HYDROCHLORIDE 50 MG/1
50 TABLET ORAL
Qty: 30 TABLET | Refills: 0 | Status: CANCELLED | OUTPATIENT
Start: 2021-06-07 | End: 2021-06-21

## 2021-06-07 RX ORDER — TRAMADOL HYDROCHLORIDE AND ACETAMINOPHEN 37.5; 325 MG/1; MG/1
1 TABLET ORAL
Qty: 14 TABLET | Refills: 0 | Status: SHIPPED | OUTPATIENT
Start: 2021-06-07 | End: 2021-06-12

## 2021-06-07 NOTE — PROGRESS NOTES
AMBULATORY PROGRESS NOTE      Patient: Teresa Snyder             MRN: [id number]     SSN: [id number] Body mass index is 25.5 kg/m². YOB: 1965     AGE: 60 y.o. EX: male    PCP: Samreen Hannah NP       IMPRESSION //  DIAGNOSIS AND TREATMENT PLAN        Teresa Snyder has a diagnosis of:// symptomatic, right hindfoot, grinding and popping, at the transverse tarsal joint, subtalar and talofibular joints. He and effusion, the anterior portion of his right ankle. He is referred to me by a rheumatologist.  I believe is Dr. Yulissa Vargas. To better assess the ankle subtalar regions and transverse tarsal joint, recommendations MRI each of the right ankle, and right foot. A cam walker boot, was placed on him today for protection and support. DIAGNOSES    1. Chronic pain of right ankle    2. Primary osteoarthritis of right foot    3. Primary osteoarthritis of right ankle        Orders Placed This Encounter    Generic Supply Order     Right short CAM walker boot will be given and placed on the patient.  AMB POC XRAY, ANKLE; COMPLETE, 3+ VIE     Order Specific Question:   Reason for Exam     Answer:   PAIN    MRI ANKLE RT WO CONT     Patient would like to have this done at [facility], [address] Standing Status:   Future     Standing Expiration Date:   7/7/2021     Order Specific Question:   Arthrogram study     Answer:   No    MRI FOOT RT WO CONT     Patient would like to have this done at [facility], [address] Standing Status:   Future     Standing Expiration Date:   7/7/2021     Order Specific Question:   Region of foot     Answer:   Whole    traMADol-acetaminophen (Ultracet) 37.5-325 mg per tablet     Sig: Take 1 Tablet by mouth three (3) times daily as needed for Pain for up to 5 days. Max Daily Amount: 3 Tablets. Dispense:  14 Tablet     Refill:  0        PLAN:    1. I will obtain 3-view x-ray of right ankle in the office today.    2. I will provide a DME order: Right short CAM walker boot will be given and placed on the patient. 3. I will provide a Rx of Tramadol BID PRN for his right ankle pain. 4. I will order a MRI of his right ankle and foot at [place] [address]; per patient's request.       RTO-  F/u after MRI    Mayuri Santizo  expresses understanding of the diagnosis, treatment plan, and all of their proposed questions were answered to their satisfaction. Patient education has been provided re the diagnoses. HPI //  Elroy Churchill IS A 60 y.o. male who is a/an  new patient, presenting to my outpatient office for evaluation of  the following chief complaint(s):     Chief Complaint   Patient presents with    Ankle Pain     Right ankle    Swelling       Patient presents today with immediate, sharp right ankle pain accompanied with swelling that has been present for a year. He can tolerate his right ankle pain at night when his right ankle is elevated. No recent trauma to right ankle. Pt's treatment to date includes Tylenol Extra Strength. Denies any h/o of DM, no issues with heart, lungs, or kidneys, any fevers, shakes, chill, or night sweats. He states he has been diagnosed with RA. Pt is unsure if he is on medication for his RA. Visit Vitals  Pulse 72   Resp 15   Ht 6' (1.829 m)   Wt 188 lb (85.3 kg)   SpO2 100%   BMI 25.50 kg/m²       Appearance: Alert, well appearing and pleasant patient who is in no distress, oriented to person, place/time, and who follows commands. This patient is accompanied in the examination room by his  self. There is signs of: no dementia  Psychiatric: Affect/mood are appropriate. Speech normal in context and clarity, memory intact grossly, no involuntary movements - tremors. Patient arrives to office via: without assistive device:    H EENT (2): Head normocephalic & atraumatic.   Eye: pupils are round// EOM are intact // Neck: ROM WNL  // Hearings Intact   Respiratory: Breathing non labored ANKLE/FOOT bilateral    Gait: slow  Tenderness: mild to right anterolateral aspect ankle and medial aspect of right hindfoot   Cutaneous: tiny, varicose veins on the lateral aspect and lateral right and left hindfoot, mild swelling to right hindfoot and right midfoot junction  Joint Motion: grinding/popping of right ankle during inversion  Joint / Tendon Stability: No Ankle or Subtalar instability or joint laxity. No peroneal sublux ability or dislocation  Alignment: neutral Hindfoot,    Neuro Motor/Sensory: NL/NL  Vascular: NL foot/ankle pulses,   Lymphatics: No extremity lymphedema, No calf swelling, no tenderness to calf muscles. CHART REVIEW     Kim Barragan has been experiencing pain and discomfort confirmed as outlined in the pain assessment outlined below.  was reviewed by Donnie Jean MD on 6/7/2021. Pain Assessment  6/7/2021   Location of Pain Ankle   Pain Location Comment -   Location Modifiers Right   Severity of Pain 9   Quality of Pain Burning; Sharp   Quality of Pain Comment -   Duration of Pain Persistent   Frequency of Pain Constant   Date Pain First Started (No Data)   Date Pain First Started Comment a year ago   Aggravating Factors Walking;Stairs   Aggravating Factors Comment -   Limiting Behavior Yes   Relieving Factors Elevation; Ice   Relieving Factors Comment -   Result of Injury No        Kim Barragan  has a past medical history of Arthritis, Chronic pain, Contact dermatitis and other eczema, due to unspecified cause, Depression, Headache(784.0), Hypertension, Rheumatoid arthritis(714.0) (1983), Rheumatoid arthritis(714.0), and Seasonal allergic rhinitis.  He also has no past medical history of Abuse, Anemia NEC, Arrhythmia, Asthma, CAD (coronary artery disease), Calculus of kidney, Cancer ([facility] Chronic kidney disease, Congestive heart failure, unspecified, COPD, Diabetes ([facility] GERD (gastroesophageal reflux disease), Hypercholesterolemia, Liver disease, Psychotic disorder ([facility] PUD (peptic ulcer disease), Seizures ([facility] Stroke ([facility] Thromboembolus ([facility] Thyroid disease, or Trauma. Patients is employed at:         Past Medical History:   Diagnosis Date    Arthritis     Chronic pain     joints due to Rheumatoid Arthritis    Contact dermatitis and other eczema, due to unspecified cause     dry skin in scalp    Depression     Headache(784.0)     Hypertension     Rheumatoid arthritis(714.0) 1983    Rheumatoid arthritis(714.0)     Seasonal allergic rhinitis      Past Surgical History:   Procedure Laterality Date    HX CYST REMOVAL  1983    large cyst removed on left shoulder     Current Outpatient Medications   Medication Sig    traMADol-acetaminophen (Ultracet) 37.5-325 mg per tablet Take 1 Tablet by mouth three (3) times daily as needed for Pain for up to 5 days. Max Daily Amount: 3 Tablets.  ferrous sulfate 325 mg (65 mg iron) tablet     doxycycline (VIBRAMYCIN) 100 mg capsule     doxycycline (MONODOX) 100 mg capsule Take 100 mg by mouth two (2) times a day.  colchicine 0.6 mg tablet     FeroSul 325 mg (65 mg iron) tablet     fluticasone propionate (FLONASE) 50 mcg/actuation nasal spray 2 Sprays by Both Nostrils route daily.  predniSONE (DELTASONE) 20 mg tablet Take 3 tablets daily by mouth for 3 days, then take 2 tablets daily by mouth for 3 days, then take 1 tablet daily by mouth for 3 days.  allopurinoL (ZYLOPRIM) 300 mg tablet     sildenafil citrate (VIAGRA) 100 mg tablet TAKE ONE TABLET BY MOUTH ONE HOUR PRIOR TO SEXUAL ACTIVITY - MAXIMUM DAILY DOSE: 100MG    hydroCHLOROthiazide (HYDRODIURIL) 25 mg tablet     cetirizine (ZYRTEC) 10 mg tablet     amLODIPine (NORVASC) 10 mg tablet Take 1 Tab by mouth daily. Indications: hypertension     No current facility-administered medications for this visit.      Allergies   Allergen Reactions    Lisinopril Anaphylaxis     Patient experienced throat swelling and respiratory distress as a result. Social History     Occupational History    Not on file   Tobacco Use    Smoking status: Current Every Day Smoker     Packs/day: 1.00     Years: 25.00     Pack years: 25.00     Types: Cigarettes    Smokeless tobacco: Never Used   Vaping Use    Vaping Use: Never assessed   Substance and Sexual Activity    Alcohol use: Yes     Comment: occasionally    Drug use: Not Currently     Types: OTC, Prescription    Sexual activity: Yes     Partners: Female     Family History   Problem Relation Age of Onset    Emphysema Mother    Courtney Horn Arthritis-rheumatoid Mother     Emphysema Father     Arthritis-rheumatoid Father     Cancer Sister 35        breast    Liver Disease Brother     Thyroid Disease Brother         DIAGNOSTIC LAB DATA      Lab Results   Component Value Date/Time    Hemoglobin A1c 4.6 (L) 04/28/2021 10:03 AM    Hemoglobin A1c 4.6 (L) 09/28/2017 10:16 AM    Hemoglobin A1c 4.8 11/16/2016 10:47 AM    //   Lab Results   Component Value Date/Time    Glucose 97 04/28/2021 10:03 AM        No results found for: CII1FMJB, ALC9XYYP      Lab Results   Component Value Date/Time    VITAMIN D, 25-HYDROXY 29.8 (L) 09/28/2017 10:16 AM         REVIEW OF SYSTEMS : 6/7/2021  ALL BELOW ARE Negative except : SEE HPI     All other systems reviewed and are negative. 12 point review of systems otherwise negative unless noted in HPI. DIAGNOSTIC IMAGING /ORDERS     Three-view, right ankle, today, nonweightbearing, standard views: AP, lateral, oblique 6/7/2021     Swelling diffusely to the medial lateral aspect of the ankle, ankle joint still well fairly preserved on these nonweightbearing x-rays, there are some OA changes, seen to the hindfoot at the subtalar joint region. I have reviewed the results of the above study. The interpretation of this study is my professional opinion.             On this date 06/07/2021 I have spent 26 minutes reviewing previous notes, test results and face to face with the patient discussing the diagnosis and importance of compliance with the treatment plan as well as documenting on the day of the visit. An electronic signature was used to authenticate this note. Disclaimer: Sections of this note are dictated using utilizing voice recognition software, which may have resulted in some phonetic based errors in grammar and contents. Even though attempts were made to correct all the mistakes, some may have been missed, and remained in the body of the document. If questions arise, please contact our department. Xavier Pa may have a reminder for a \"due or due soon\" health maintenance. I have asked that he contact his primary care provider for follow-up on this health maintenance. Britt Francisco, as dictated by Surjit Powers MD  6/7/2021  9:17 AM

## 2021-06-15 ENCOUNTER — DOCUMENTATION ONLY (OUTPATIENT)
Dept: ORTHOPEDIC SURGERY | Age: 56
End: 2021-06-15

## 2021-07-14 ENCOUNTER — TELEPHONE (OUTPATIENT)
Dept: ORTHOPEDIC SURGERY | Age: 56
End: 2021-07-14

## 2021-07-14 DIAGNOSIS — M19.071 PRIMARY OSTEOARTHRITIS OF RIGHT FOOT: Primary | ICD-10-CM

## 2021-07-14 DIAGNOSIS — M19.071 PRIMARY OSTEOARTHRITIS OF RIGHT ANKLE: ICD-10-CM

## 2021-07-14 NOTE — TELEPHONE ENCOUNTER
[facility] Scheduling Department called in stating they are requesting for the patient MRI order faxed over to  fax# [phone] tel# [phone]. The representative stated patient's insurance company will over cover one foot/ankle.

## 2021-07-21 NOTE — PROGRESS NOTES
AMBULATORY PROGRESS NOTE      Patient: Annette Faust             MRN: [id number]     SSN: [id number] Body mass index is 25.25 kg/m². YOB: 1965     AGE: 60 y.o. EX: male    PCP: Samreen Hannah NP       IMPRESSION //  DIAGNOSIS AND TREATMENT PLAN        Annette Faust has a diagnosis of:     Annette Faust has findings on his MRI each of the right ankle and right foot, that are consistent with inflammatory arthropathy. He has diffuse bone marrow edema to the inferior portion of the talus, the cuneiform bones and medial middle, lateral cuneiform bones. Associated synovitis, to the ankle, and multiple joints within the hindfoot. I strongly recommend him seeing a rheumatologist.  He has not seen a rheumatologist for many years now. He now informs me today, the first time, that he also has bilateral wrist pain, left wrist deformity, bilateral knee swelling. In looking at him in totality, a clearly has an inflammatory arthropathy occurring any need to go and see rheumatologist ASAP. DIAGNOSES    1. Inflammatory arthritis    2. Chronic pain of right ankle    3. Pain in both wrists        Orders Placed This Encounter    REFERRAL TO RHEUMATOLOGY     Referral Priority:   Routine     Referral Type:   Consultation     Referral Reason:   Specialty Services Required     Referred to Provider:   Brandi Tejada MD     Number of Visits Requested:   1    [facility]     Referral Priority:   Routine     Referral Type:   Consultation     Referral Reason:   Specialty Services Required     Referred to Provider:   Mercedes Mills DO     Number of Visits Requested:   1    celecoxib (CELEBREX) 200 mg capsule     Sig: Take 1 Capsule by mouth daily for 90 days. Dispense:  30 Capsule     Refill:  2        PLAN:    1. Referral to Kelley Tom  and   2. Advise patient to continue wearing short CAM walker boot  3.  Prescribe Rx of Celebrex for right ankle and foot pain      RTO-  3 weeks    Jairo Rain  expresses understanding of the diagnosis, treatment plan, and all of their proposed questions were answered to their satisfaction. Patient education has been provided re the diagnoses. HPI //  Elroy Churchill IS A 60 y.o. male who is a/an  established patient, presenting to my outpatient office for evaluation of  the following chief complaint(s):     No chief complaint on file. Patient presents today for f/u of MRI results of each, the right ankle and right foot. Harper Crofts He continues to endorse right ankle/foot pain along w/ swelling. Patient notes pain and swelling in bilateral  hands, knees, and elbows. He states he can't walk pain free w/o short Cam walker boot. No change in his right ankle pain and swelling. Visit Vitals  Pulse 86   Temp 98.2 °F (36.8 °C) (Temporal)   Resp 18   Ht 6' (1.829 m)   Wt 186 lb 3.2 oz (84.5 kg) Comment: with boot   SpO2 98%   BMI 25.25 kg/m²       Appearance: Alert, well appearing and pleasant patient who is in no distress, oriented to person, place/time, and who follows commands. This patient is accompanied in the examination room by his  self. There is signs of: no dementia  Psychiatric: Affect/mood are appropriate. Speech normal in context and clarity, memory intact grossly, no involuntary movements - tremors. Patient arrives to office via: with assistive device: Short CAM walker boot  H EENT (2): Head normocephalic & atraumatic. Eye: pupils are round// EOM are intact // Neck: ROM WNL  // Hearings Intact   Respiratory: Breathing non labored     ANKLE/FOOT right    Gait: uses assistive device   Short CAM walker boot  Tenderness: moderate  midfoot   Cutaneous: diffuse swelling to midfoot  Joint Motion: limited hindfoot and ankle motion  Joint / Tendon Stability: No Ankle or Subtalar instability or joint laxity.                        No peroneal sublux ability or dislocation  Alignment: neutral Hindfoot,    Neuro Motor/Sensory: NL/NL  Vascular: NL foot/ankle pulses,   Lymphatics: No extremity lymphedema, No calf swelling, no tenderness to calf muscles. CHART REVIEW     Josh Siddiqui has been experiencing pain and discomfort confirmed as outlined in the pain assessment outlined below.  was reviewed by Denita Dunlap MD on 7/26/2021. Pain Assessment  7/26/2021   Location of Pain Ankle; Foot   Pain Location Comment -   Location Modifiers Right   Severity of Pain 7   Quality of Pain Sharp; Aching; Throbbing   Quality of Pain Comment -   Duration of Pain A few minutes   Frequency of Pain Constant   Date Pain First Started -   Date Pain First Started Comment -   Aggravating Factors Walking;Stairs;Standing; Other (Comment)   Aggravating Factors Comment any movement   Limiting Behavior Yes   Relieving Factors Nothing   Relieving Factors Comment -   Result of Injury No        Josh Siddiqui  has a past medical history of Arthritis, Chronic pain, Contact dermatitis and other eczema, due to unspecified cause, Depression, Headache(784.0), Hypertension, Rheumatoid arthritis(714.0) (1983), Rheumatoid arthritis(714.0), and Seasonal allergic rhinitis. He also has no past medical history of Abuse, Anemia NEC, Arrhythmia, Asthma, CAD (coronary artery disease), Calculus of kidney, Cancer ([address] Chronic kidney disease, Congestive heart failure, unspecified, COPD, Diabetes ([address] GERD (gastroesophageal reflux disease), Hypercholesterolemia, Liver disease, Psychotic disorder ([address] PUD (peptic ulcer disease), Seizures ([address] Stroke ([address] Thromboembolus ([address] Thyroid disease, or Trauma.      Patients is employed at:         Past Medical History:   Diagnosis Date    Arthritis     Chronic pain     joints due to Rheumatoid Arthritis    Contact dermatitis and other eczema, due to unspecified cause     dry skin in scalp    Depression     Headache(784.0)     Hypertension     Rheumatoid arthritis(714.0) 1983  Rheumatoid arthritis(714.0)     Seasonal allergic rhinitis      Past Surgical History:   Procedure Laterality Date    HX CYST REMOVAL  1983    large cyst removed on left shoulder     Current Outpatient Medications   Medication Sig    celecoxib (CELEBREX) 200 mg capsule Take 1 Capsule by mouth daily for 90 days.  ferrous sulfate 325 mg (65 mg iron) tablet     doxycycline (VIBRAMYCIN) 100 mg capsule     doxycycline (MONODOX) 100 mg capsule Take 100 mg by mouth two (2) times a day.  colchicine 0.6 mg tablet     FeroSul 325 mg (65 mg iron) tablet     fluticasone propionate (FLONASE) 50 mcg/actuation nasal spray 2 Sprays by Both Nostrils route daily.  allopurinoL (ZYLOPRIM) 300 mg tablet     cetirizine (ZYRTEC) 10 mg tablet     amLODIPine (NORVASC) 10 mg tablet Take 1 Tab by mouth daily. Indications: hypertension    predniSONE (DELTASONE) 20 mg tablet Take 3 tablets daily by mouth for 3 days, then take 2 tablets daily by mouth for 3 days, then take 1 tablet daily by mouth for 3 days. (Patient not taking: Reported on 7/26/2021)    sildenafil citrate (VIAGRA) 100 mg tablet TAKE ONE TABLET BY MOUTH ONE HOUR PRIOR TO SEXUAL ACTIVITY - MAXIMUM DAILY DOSE: 100MG    hydroCHLOROthiazide (HYDRODIURIL) 25 mg tablet  (Patient not taking: Reported on 7/26/2021)     No current facility-administered medications for this visit. Allergies   Allergen Reactions    Lisinopril Anaphylaxis     Patient experienced throat swelling and respiratory distress as a result.        Social History     Occupational History    Not on file   Tobacco Use    Smoking status: Current Every Day Smoker     Packs/day: 1.00     Years: 25.00     Pack years: 25.00     Types: Cigarettes    Smokeless tobacco: Never Used   Vaping Use    Vaping Use: Never assessed   Substance and Sexual Activity    Alcohol use: Yes     Comment: occasionally    Drug use: Not Currently     Types: OTC, Prescription    Sexual activity: Yes     Partners: Female     Family History   Problem Relation Age of Onset    Emphysema Mother    [facility] Arthritis-rheumatoid Mother     Emphysema Father     Arthritis-rheumatoid Father     Cancer Sister 35        breast    Liver Disease Brother     Thyroid Disease Brother         DIAGNOSTIC LAB DATA      Lab Results   Component Value Date/Time    Hemoglobin A1c 4.6 (L) 04/28/2021 10:03 AM    Hemoglobin A1c 4.6 (L) 09/28/2017 10:16 AM    Hemoglobin A1c 4.8 11/16/2016 10:47 AM    //   Lab Results   Component Value Date/Time    Glucose 97 04/28/2021 10:03 AM        No results found for: MUP3IHYC, [id number]      Lab Results   Component Value Date/Time    VITAMIN D, 25-HYDROXY 29.8 (L) 09/28/2017 10:16 AM         REVIEW OF SYSTEMS : 7/26/2021  ALL BELOW ARE Negative except : SEE HPI     All other systems reviewed and are negative. 12 point review of systems otherwise negative unless noted in HPI. DIAGNOSTIC IMAGING /ORDERS       Orders Placed This Encounter    REFERRAL TO RHEUMATOLOGY     Referral Priority:   Routine     Referral Type:   Consultation     Referral Reason:   Specialty Services Required     Referred to Provider:   Divya Grimaldo MD     Number of Visits Requested:   1    [facility]     Referral Priority:   Routine     Referral Type:   Consultation     Referral Reason:   Specialty Services Required     Referred to Provider:   Dennie Poke, DO     Number of Visits Requested:   1    celecoxib (CELEBREX) 200 mg capsule     Sig: Take 1 Capsule by mouth daily for 90 days. Dispense:  30 Capsule     Refill:  2         [facility] FACILITY IMAGING : INTERPRETATION BY RADIOLOGIST       MR ANKLE WO IV CON RIGHT    Impression      1. Diffuse marrow signal abnormality is seen throughout the navicular, cuneiforms, talar anterior process, and second and third metatarsal bases.  Extent of synovitis throughout this distribution and subacute/chronic partially sclerotic appearance would seem to favor an inflammatory arthropathy in etiology, such as rheumatoid arthritis. There is also a small to moderate-sized right ankle joint effusion with prominent synovitis which would similarly favor an inflammatory arthropathy. Currently this does not have the typical distribution of septic arthritis nor a neuropathic arthropathy. 2. Moderate focal peroneus brevis tendinosis. Minimal flexor tenosynovitis. Signed By: Everardo Devine MD on 7/15/2021 2:57 PM  Narrative    EXAM: MRI OF THE RIGHT ANKLE     CLINICAL INDICATIONS/HISTORY: Right ankle pain and swelling for the past 18 months     COMPARISON: Right foot MRI same day, right ankle radiographs 5/14/2021 and 1/24/2020     TECHNIQUE: Sagittal T1 and STIR; axial PD and T2 with fat saturation; coronal T2 with fat saturation and axial oblique PD images of the ankle were obtained.     _______________     FINDINGS:     ANKLE LIGAMENTS:   Anterior and posterior inferior tibiofibular ligaments and syndesmosis: Intact   Anterior and posterior talofibular and the calcaneofibular ligaments: Intact   Deltoid and spring ligaments: Intact     ANKLE AND FOOT TENDONS:   Peroneus longus and brevis tendons: Peroneus longus remains normal in caliber and signal intensity throughout. There is moderate tendinosis within the peroneus brevis tendon beginning just beyond the level of the fibula and extending around the level of the peroneal tubercle. Tibialis posterior tendon: Intact and normal in signal intensity. Flexor digitorum longus and flexor hallucis longus tendons: Intact and tendon remains normal in signal intensity. Minimal tenosynovitis. Extensor tendons: Intact and normal in signal intensity. Achilles tendon: Achilles tendon is normal in morphology and signal intensity.  Kager's fat pad is unremarkable.  No retrocalcaneal bursitis. OSSEOUS:   Ankle:  There is normal marrow signal intensity throughout the distal right tibia, distal right fibula, and all of the talar dome. Small to moderate-sized, heterogeneous right ankle joint effusion with several regions of peripheral synovitis. No partial or full thickness cartilage defect. No osteochondral lesion. No fracture or contusion. Hindfoot: Mild patchy heterogeneous T1 and STIR signal abnormality is seen throughout subchondral distributions of the talar anterior process. Normal maintained marrow signal intensity throughout all of the calcaneus. No fracture or contusion. No hindfoot coalition.  No degenerative osteoarthropathy of the subtalar joints. Midfoot: Prominent diffuse marrow signal abnormality is seen throughout all of the navicular and relatively diffusely throughout first, second, and third cuneiforms. There is also mild signal abnormality throughout the second and third metatarsal bases. Normal marrow signal intensity throughout visualized portions of the first, fourth, and fifth metatarsals. Normal marrow signal intensity is also seen throughout all of the cuboid. There is moderate intertarsal reactive synovitis but no significant joint fluid. Scattered regions of full-thickness cartilage loss, subchondral sclerosis, and subchondral edema are also present. Currently there is no fragmentation, collapse or subluxation throughout these intertarsal articulations. No fracture or contusion. OTHER:   Sinus tarsi: Normal fat signal intensity. Plantar fascia: Intact, normal in caliber and signal intensity. Tarsal tunnel: Normal in appearance. Regional soft tissues: No abnormal soft tissue fluid collections.      _______________  Other Result Text    Interface, [employer] Rad Res - 07/15/2021  2:59 PM EDT   Formatting of this note might be different from the original.   EXAM: MRI OF THE RIGHT ANKLE     CLINICAL INDICATIONS/HISTORY: Right ankle pain and swelling for the past 18 months     COMPARISON: Right foot MRI same day, right ankle radiographs 5/14/2021 and 1/24/2020     TECHNIQUE: Sagittal T1 and STIR; axial PD and T2 with fat saturation; coronal T2 with fat saturation and axial oblique PD images of the ankle were obtained.     _______________     FINDINGS:     ANKLE LIGAMENTS:   Anterior and posterior inferior tibiofibular ligaments and syndesmosis: Intact   Anterior and posterior talofibular and the calcaneofibular ligaments: Intact   Deltoid and spring ligaments: Intact     ANKLE AND FOOT TENDONS:   Peroneus longus and brevis tendons: Peroneus longus remains normal in caliber and signal intensity throughout. There is moderate tendinosis within the peroneus brevis tendon beginning just beyond the level of the fibula and extending around the level of the peroneal tubercle. Tibialis posterior tendon: Intact and normal in signal intensity. Flexor digitorum longus and flexor hallucis longus tendons: Intact and tendon remains normal in signal intensity. Minimal tenosynovitis. Extensor tendons: Intact and normal in signal intensity. Achilles tendon: Achilles tendon is normal in morphology and signal intensity.  Kager's fat pad is unremarkable.  No retrocalcaneal bursitis. OSSEOUS:   Ankle: There is normal marrow signal intensity throughout the distal right tibia, distal right fibula, and all of the talar dome. Small to moderate-sized, heterogeneous right ankle joint effusion with several regions of peripheral synovitis. No partial or full thickness cartilage defect. No osteochondral lesion. No fracture or contusion. Hindfoot: Mild patchy heterogeneous T1 and STIR signal abnormality is seen throughout subchondral distributions of the talar anterior process. Normal maintained marrow signal intensity throughout all of the calcaneus. No fracture or contusion. No hindfoot coalition.  No degenerative osteoarthropathy of the subtalar joints.    Midfoot: Prominent diffuse marrow signal abnormality is seen throughout all of the navicular and relatively diffusely throughout first, second, and third cuneiforms. There is also mild signal abnormality throughout the second and third metatarsal bases. Normal marrow signal intensity throughout visualized portions of the first, fourth, and fifth metatarsals. Normal marrow signal intensity is also seen throughout all of the cuboid. There is moderate intertarsal reactive synovitis but no significant joint fluid. Scattered regions of full-thickness cartilage loss, subchondral sclerosis, and subchondral edema are also present. Currently there is no fragmentation, collapse or subluxation throughout these intertarsal articulations. No fracture or contusion. OTHER:   Sinus tarsi: Normal fat signal intensity. Plantar fascia: Intact, normal in caliber and signal intensity. Tarsal tunnel: Normal in appearance. Regional soft tissues: No abnormal soft tissue fluid collections. _______________     IMPRESSION     1. Diffuse marrow signal abnormality is seen throughout the navicular, cuneiforms, talar anterior process, and second and third metatarsal bases. Extent of synovitis throughout this distribution and subacute/chronic partially sclerotic appearance would seem to favor an inflammatory arthropathy in etiology, such as rheumatoid arthritis. There is also a small to moderate-sized right ankle joint effusion with prominent synovitis which would similarly favor an inflammatory arthropathy. Currently this does not have the typical distribution of septic arthritis nor a neuropathic arthropathy. 2. Moderate focal peroneus brevis tendinosis. Minimal flexor tenosynovitis.      Signed By: Dea Herrera MD on 7/15/2021 2:57 PM     Date: 07/15/21   Received From: [facility]     Encounter Summary           MR FOOT WO IV CON RIGHT    Impression    :     Only partially visualized on this exam is diffuse signal abnormality throughout the right midfoot, minimally involving second and third metatarsal bases, better evaluated on the concurrent right ankle MRI, reported separately. There are no acute or suspicious findings throughout the right forefoot. Signed By: Raphael Garcia MD on 7/15/2021 2:28 PM  Narrative    EXAM: MRI OF THE RIGHT FOREFOOT     CLINICAL INDICATION/HISTORY: Right foot pain and swelling for the past 18 months     COMPARISON: Right ankle MRI same day, right ankle and right foot radiographs 1/24/2020     TECHNIQUE:  Sagittal T1 and STIR; coronal T1 and T2 with fat saturation; axial T1 and T2 with fat saturation sequences of the forefoot were obtained.     _______________     FINDINGS:     SOFT TISSUES: Subcutaneous and deep soft tissues are within normal limits. OSSEOUS/JOINTS:     >  Patchy subchondral edema is seen within the second and third metatarsal bases.     >  There is otherwise normal marrow signal intensity throughout the first, fourth, and fifth metatarsals and all of the phalanges. No malalignment. No hallux valgus or metatarsus primus varus angulation. No joint effusion throughout the right forefoot.     >  Only partially visualized on this exam is heterogeneous marrow signal abnormality throughout the midfoot, predominantly involving second and third cuneiforms and navicular. All of these findings are better evaluated on the concurrent right ankle MRI, reported separately. INTRINSIC MUSCULATURE: Muscular and tendinous structures of the visualized mid and forefoot unremarkable. LIGAMENTS:  Ligamentous structures, to include the Lisfranc ligament complex, are within normal limits.      OTHER: The distal portions of the medial and lateral fascial aponeurosis are unremarkable.     _______________  Other Result Text    Interface, Powers[employer] Rad Res - 07/15/2021  2:30 PM EDT   Formatting of this note might be different from the original.   EXAM: MRI OF THE RIGHT FOREFOOT     CLINICAL INDICATION/HISTORY: Right foot pain and swelling for the past 18 months     COMPARISON: Right ankle MRI same day, right ankle and right foot radiographs 1/24/2020     TECHNIQUE:  Sagittal T1 and STIR; coronal T1 and T2 with fat saturation; axial T1 and T2 with fat saturation sequences of the forefoot were obtained.     _______________     FINDINGS:     SOFT TISSUES: Subcutaneous and deep soft tissues are within normal limits. OSSEOUS/JOINTS:     >  Patchy subchondral edema is seen within the second and third metatarsal bases.     >  There is otherwise normal marrow signal intensity throughout the first, fourth, and fifth metatarsals and all of the phalanges. No malalignment. No hallux valgus or metatarsus primus varus angulation. No joint effusion throughout the right forefoot.     >  Only partially visualized on this exam is heterogeneous marrow signal abnormality throughout the midfoot, predominantly involving second and third cuneiforms and navicular. All of these findings are better evaluated on the concurrent right ankle MRI, reported separately. INTRINSIC MUSCULATURE: Muscular and tendinous structures of the visualized mid and forefoot unremarkable. LIGAMENTS:  Ligamentous structures, to include the Lisfranc ligament complex, are within normal limits. OTHER: The distal portions of the medial and lateral fascial aponeurosis are unremarkable.     _______________     IMPRESSION:     Only partially visualized on this exam is diffuse signal abnormality throughout the right midfoot, minimally involving second and third metatarsal bases, better evaluated on the concurrent right ankle MRI, reported separately. There are no acute or suspicious findings throughout the right forefoot. Signed By: Gisella Godinez MD on 7/15/2021 2:28 PM     Date: 07/15/21   Received From: [address]           I have reviewed the radiology transcribed results and I have reviewed the images of the above study. An electronic signature was used to authenticate this note.       Disclaimer: Sections of this note are dictated using utilizing voice recognition software, which may have resulted in some phonetic based errors in grammar and contents. Even though attempts were made to correct all the mistakes, some may have been missed, and remained in the body of the document. If questions arise, please contact our department. Nickie Tejada may have a reminder for a \"due or due soon\" health maintenance. I have asked that he contact his primary care provider for follow-up on this health maintenance. Ashwin Enrique, as dictated by Aarti Powers MD  7/26/2021  1:35 PM

## 2021-07-26 ENCOUNTER — OFFICE VISIT (OUTPATIENT)
Dept: ORTHOPEDIC SURGERY | Age: 56
End: 2021-07-26
Payer: MEDICAID

## 2021-07-26 VITALS
HEART RATE: 86 BPM | RESPIRATION RATE: 18 BRPM | HEIGHT: 72 IN | BODY MASS INDEX: 25.22 KG/M2 | TEMPERATURE: 98.2 F | OXYGEN SATURATION: 98 % | WEIGHT: 186.2 LBS

## 2021-07-26 DIAGNOSIS — M19.90 INFLAMMATORY ARTHRITIS: Primary | ICD-10-CM

## 2021-07-26 DIAGNOSIS — M25.531 PAIN IN BOTH WRISTS: ICD-10-CM

## 2021-07-26 DIAGNOSIS — G89.29 CHRONIC PAIN OF RIGHT ANKLE: ICD-10-CM

## 2021-07-26 DIAGNOSIS — M25.571 CHRONIC PAIN OF RIGHT ANKLE: ICD-10-CM

## 2021-07-26 DIAGNOSIS — M25.532 PAIN IN BOTH WRISTS: ICD-10-CM

## 2021-07-26 PROCEDURE — 99214 OFFICE O/P EST MOD 30 MIN: CPT | Performed by: ORTHOPAEDIC SURGERY

## 2021-07-26 RX ORDER — CELECOXIB 200 MG/1
200 CAPSULE ORAL DAILY
Qty: 30 CAPSULE | Refills: 2 | Status: SHIPPED | OUTPATIENT
Start: 2021-07-26 | End: 2021-11-07 | Stop reason: SDUPTHER

## 2021-07-26 NOTE — PROGRESS NOTES
Jorge Shore   [address]  YOB: 1965       Dear Dr Karen Montanez MD    Jorge Shore is under my care, having presented to me with a 18-month history of severe pain and swelling to his right ankle and right midfoot. He denied any history of Charcot neuroarthropathy, or any history of neuropathic process, but did mention that he had a history of rheumatoid arthritis, although he has not been treated for this for many years now. When I evaluated him, on June 6, 2021, he presented with severe tenderness to his right midfoot anterior ankle, and had bogginess and swelling diffuse swelling to his right anterior ankle and dorsal midfoot. His clinical examination, was suggestive of possible shock or neural arthropathic process, but in lieu of his history of having mentioning that he had rheumatoid arthritis, although he had not been treated for it for many years, is concerned about a severe destructive process still to the joints, of his midfoot. His MRI, of the ankle and foot, were indeed items of medical necessity, to assess the quality of the corresponding midfoot bones and the corresponding joints. He had an MRI, of the right ankle, and right foot, and in totality, the findings are extremely concerning. He has diffuse bone marrow edema throughout the navicular, all the cuneiforms, the anterior talus, and the second and third metatarsal basilar regions. There is extensive synovitis, seen throughout the midfoot, and anterior ankle, radiologist feels that these findings are consistent with an inflammatory arthropathy in etiology, such as rheumatoid arthritis. He has moderate ankle effusion, as well and synovitis of his ankle. Please cover the costs of his MRI:  Right ankle/foot    The MRI each of the right ankle right foot, or items of medical necessity, is a have prognostic value in the help to direct his care.     Sincerely,       Fabrizio Ho MD Priya  7/26/2021  10:10 AM

## 2021-07-27 ENCOUNTER — TELEPHONE (OUTPATIENT)
Dept: ORTHOPEDIC SURGERY | Age: 56
End: 2021-07-27

## 2021-07-27 NOTE — TELEPHONE ENCOUNTER
PA is required for Celecoxib 200mg    Cover My [employer] Key: [id number] - PA Case ID: [id number] - Rx #: [id number]

## 2021-07-29 ENCOUNTER — OFFICE VISIT (OUTPATIENT)
Dept: FAMILY MEDICINE CLINIC | Age: 56
End: 2021-07-29
Payer: MEDICAID

## 2021-07-29 VITALS
BODY MASS INDEX: 25.19 KG/M2 | SYSTOLIC BLOOD PRESSURE: 120 MMHG | HEIGHT: 72 IN | TEMPERATURE: 97.3 F | WEIGHT: 186 LBS | HEART RATE: 69 BPM | OXYGEN SATURATION: 99 % | DIASTOLIC BLOOD PRESSURE: 72 MMHG | RESPIRATION RATE: 16 BRPM

## 2021-07-29 DIAGNOSIS — I10 ESSENTIAL HYPERTENSION: ICD-10-CM

## 2021-07-29 DIAGNOSIS — T78.40XA ALLERGY, INITIAL ENCOUNTER: ICD-10-CM

## 2021-07-29 DIAGNOSIS — M10.00 IDIOPATHIC GOUT, UNSPECIFIED CHRONICITY, UNSPECIFIED SITE: ICD-10-CM

## 2021-07-29 DIAGNOSIS — Z13.6 SCREENING FOR CARDIOVASCULAR CONDITION: ICD-10-CM

## 2021-07-29 DIAGNOSIS — M06.9 RHEUMATOID ARTHRITIS INVOLVING MULTIPLE SITES, UNSPECIFIED WHETHER RHEUMATOID FACTOR PRESENT (HCC): ICD-10-CM

## 2021-07-29 DIAGNOSIS — C61 PROSTATE CANCER (HCC): ICD-10-CM

## 2021-07-29 DIAGNOSIS — Z13.228 SCREENING FOR ENDOCRINE, METABOLIC AND IMMUNITY DISORDER: ICD-10-CM

## 2021-07-29 DIAGNOSIS — Z13.0 SCREENING FOR ENDOCRINE, METABOLIC AND IMMUNITY DISORDER: ICD-10-CM

## 2021-07-29 DIAGNOSIS — Z13.29 SCREENING FOR ENDOCRINE, METABOLIC AND IMMUNITY DISORDER: ICD-10-CM

## 2021-07-29 DIAGNOSIS — Z76.0 MEDICATION REFILL: ICD-10-CM

## 2021-07-29 PROBLEM — M10.9 GOUT: Status: ACTIVE | Noted: 2021-07-29

## 2021-07-29 PROCEDURE — 99214 OFFICE O/P EST MOD 30 MIN: CPT | Performed by: NURSE PRACTITIONER

## 2021-07-29 RX ORDER — COLCHICINE 0.6 MG/1
0.6 TABLET ORAL DAILY
Qty: 90 TABLET | Refills: 1 | Status: SHIPPED | OUTPATIENT
Start: 2021-07-29 | End: 2022-06-15

## 2021-07-29 RX ORDER — ALLOPURINOL 300 MG/1
300 TABLET ORAL DAILY
Qty: 90 TABLET | Refills: 1 | Status: SHIPPED | OUTPATIENT
Start: 2021-07-29 | End: 2022-01-27 | Stop reason: SDUPTHER

## 2021-07-29 RX ORDER — FLUTICASONE PROPIONATE 50 MCG
2 SPRAY, SUSPENSION (ML) NASAL DAILY
Qty: 1 BOTTLE | Refills: 5 | Status: SHIPPED | OUTPATIENT
Start: 2021-07-29 | End: 2022-01-27 | Stop reason: SDUPTHER

## 2021-07-29 NOTE — TELEPHONE ENCOUNTER
Patient called stating Zach Nelson did not receive the prescription for Celebrex. I did see this prior auth note and wondered if they have not tried to fill it how do we know we need a prior auth for it. However, patient states they do not have it. Can we please resend?       Patient [phone]

## 2021-07-29 NOTE — PROGRESS NOTES
OFFICE NOTE    Janay Nickerson is a 60 y.o. male presenting today for the following:  Chief Complaint   Patient presents with    Follow Up Chronic Condition      HPI     Hypertension  Patient is currently on amlodipine 10 mg daily. Patient is doing well on this medication. Denies any side effects and take as prescribed. Will continue this medication. Denies chest pain, SOB, vision changes or headaches. DDD/Rheumatoid arthritis  Patient is currently being followed by Dr. Garo Briones. He is currently managing celebrex. Patient last visit was Monday 7/26/2021. Patient have not pickup medication as of yet he will pickup today. Gout  He is currently on allopurinol 300 mg daily, colchicine 0.6 mg daily. Patient has been out of medication and requesting a refill. Patient denies side effects of medication and take as prescribed. Patient states he has not had a gout attack that he is aware of in a while. He states previously he mixed up having a gout attack versus his rheumatoid arthritis flaring up. He states he saw orthopedic for his foot and was told it was his arthritis not gout. Erectile dysfunction  Patient is being followed by Dr. Sandra Shepherd. He is taking Viagra for Impotence that is managed by Dr. Sandra Shepherd. Patient denies any side effects from this medication and take as ordered. Review of Systems   Constitutional: Negative for fatigue and fever. HENT: Negative. Eyes: Negative. Respiratory: Negative for cough, chest tightness, shortness of breath and wheezing. Cardiovascular: Negative for chest pain and palpitations. Neurological: Negative for dizziness, light-headedness and headaches.          History  Past Medical History:   Diagnosis Date    Arthritis     Chronic pain     joints due to Rheumatoid Arthritis    Contact dermatitis and other eczema, due to unspecified cause     dry skin in scalp    Depression     Headache(784.0)     Hypertension     Rheumatoid arthritis(714.0) 1983    Rheumatoid arthritis(714.0)     Seasonal allergic rhinitis        Past Surgical History:   Procedure Laterality Date    HX CYST REMOVAL  1983    large cyst removed on left shoulder       Social History     Socioeconomic History    Marital status:      Spouse name: Not on file    Number of children: Not on file    Years of education: Not on file    Highest education level: Not on file   Occupational History    Not on file   Tobacco Use    Smoking status: Current Every Day Smoker     Packs/day: 1.00     Years: 25.00     Pack years: 25.00     Types: Cigarettes    Smokeless tobacco: Never Used   Vaping Use    Vaping Use: Never assessed   Substance and Sexual Activity    Alcohol use: Yes     Comment: occasionally    Drug use: Not Currently     Types: OTC, Prescription    Sexual activity: Yes     Partners: Female   Other Topics Concern    Not on file   Social History Narrative    Not on file     Social Determinants of Health     Financial Resource Strain:     Difficulty of Paying Living Expenses:    Food Insecurity:     Worried About Running Out of Food in the Last Year:     Ran Out of Food in the Last Year:    Transportation Needs:     Lack of Transportation (Medical):  Lack of Transportation (Non-Medical):    Physical Activity:     Days of Exercise per Week:     Minutes of Exercise per Session:    Stress:     Feeling of Stress :    Social Connections:     Frequency of Communication with Friends and Family:     Frequency of Social Gatherings with Friends and Family:     Attends [employer] Services:     Active Member of Clubs or Organizations:     Attends Club or Organization Meetings:     Marital Status:    Intimate Partner Violence:     Fear of Current or Ex-Partner:     Emotionally Abused:     Physically Abused:     Sexually Abused:         Allergies   Allergen Reactions    Lisinopril Anaphylaxis     Patient experienced throat swelling and respiratory distress as a result. Current Outpatient Medications   Medication Sig Dispense Refill    fluticasone propionate (FLONASE) 50 mcg/actuation nasal spray 2 Sprays by Both Nostrils route daily. 1 Bottle 5    allopurinoL (ZYLOPRIM) 300 mg tablet Take 1 Tablet by mouth daily. 90 Tablet 1    colchicine 0.6 mg tablet Take 1 Tablet by mouth daily. 90 Tablet 1    celecoxib (CELEBREX) 200 mg capsule Take 1 Capsule by mouth daily for 90 days. 30 Capsule 2    sildenafil citrate (VIAGRA) 100 mg tablet TAKE ONE TABLET BY MOUTH ONE HOUR PRIOR TO SEXUAL ACTIVITY - MAXIMUM DAILY DOSE: 100MG 30 Tab 6    cetirizine (ZYRTEC) 10 mg tablet       amLODIPine (NORVASC) 10 mg tablet Take 1 Tab by mouth daily. Indications: hypertension 90 Tab 3    ferrous sulfate 325 mg (65 mg iron) tablet       doxycycline (VIBRAMYCIN) 100 mg capsule       doxycycline (MONODOX) 100 mg capsule Take 100 mg by mouth two (2) times a day.  FeroSul 325 mg (65 mg iron) tablet       hydroCHLOROthiazide (HYDRODIURIL) 25 mg tablet  (Patient not taking: Reported on 7/26/2021)           Patient Care Team:  Patient Care Team:  Epifanio Bernardo NP as PCP - General (Nurse Practitioner)  Epifanio Bernardo NP as PCP [facility]  Angelica Hammer MD as Physician (Physical Medicine and Rehabilitation)  Annabelle Zamora MD as Physician (Rheumatology)      LABS:  No results found for any visits on 07/29/21. RADIOLOGY:  No recent results      Physical Exam  Vitals and nursing note reviewed. Constitutional:       Appearance: Normal appearance. He is well-developed. Eyes:      Pupils: Pupils are equal, round, and reactive to light. Cardiovascular:      Rate and Rhythm: Normal rate and regular rhythm. Heart sounds: Normal heart sounds. Pulmonary:      Effort: Pulmonary effort is normal. No respiratory distress. Breath sounds: Normal breath sounds. No wheezing or rales.    Abdominal:      General: Bowel sounds are normal. There is no distension. Palpations: Abdomen is soft. Tenderness: There is no abdominal tenderness. There is no rebound. Musculoskeletal:         General: Normal range of motion. Cervical back: Normal range of motion and neck supple. Lymphadenopathy:      Cervical: No cervical adenopathy. Skin:     General: Skin is warm and dry. Neurological:      Mental Status: He is alert and oriented to person, place, and time. Deep Tendon Reflexes: Reflexes are normal and symmetric. Psychiatric:         Mood and Affect: Mood normal.           Vitals:    07/29/21 0911   BP: 120/72   Pulse: 69   Resp: 16   Temp: 97.3 °F (36.3 °C)   TempSrc: Oral   SpO2: 99%   Weight: 186 lb (84.4 kg)   Height: 6' (1.829 m)   PainSc:   7   PainLoc: Foot         Assessment and Plan    1. Essential hypertension    - [facility] W/O DIFF; Future    2. Idiopathic gout, unspecified chronicity, unspecified site      3. Rheumatoid arthritis involving multiple sites, unspecified whether rheumatoid factor present ([facility] [address]      4. [facility]      5. Allergy, initial encounter    - fluticasone propionate (FLONASE) 50 mcg/actuation nasal spray; 2 Sprays by Both Nostrils route daily. Dispense: 1 Bottle; Refill: 5    6. Medication refill    - fluticasone propionate (FLONASE) 50 mcg/actuation nasal spray; 2 Sprays by Both Nostrils route daily. Dispense: 1 Bottle; Refill: 5  - allopurinoL (ZYLOPRIM) 300 mg tablet; Take 1 Tablet by mouth daily. Dispense: 90 Tablet; Refill: 1  - colchicine 0.6 mg tablet; Take 1 Tablet by mouth daily. Dispense: 90 Tablet; Refill: 1    7. Screening for endocrine, metabolic and immunity disorder    - CBC W/O DIFF; Future  - METABOLIC PANEL, COMPREHENSIVE; Future  - LIPID PANEL; Future    8. Screening for cardiovascular condition    - LIPID PANEL; Future      MDM    Procedures      *Plan of care reviewed with patient. Patient in agreement with plan and expresses understanding.  All questions answered and patient encouraged to call or RTO if further questions or concerns. Advised patient if symptoms worsen to go to nearest ER or call 911. AVS and recommendations given to patient upon discharge.

## 2021-07-29 NOTE — PROGRESS NOTES
Josh Siddiqui presents today for   Chief Complaint   Patient presents with    Follow Up Chronic Condition       Is someone accompanying this pt? no    Is the patient using any DME equipment during OV? no    Depression Screening:  3 most recent PHQ Screens 7/29/2021   Little interest or pleasure in doing things Not at all   Feeling down, depressed, irritable, or hopeless Not at all   Total Score PHQ 2 0   Trouble falling or staying asleep, or sleeping too much Not at all   Feeling tired or having little energy Not at all   Poor appetite, weight loss, or overeating Not at all   Feeling bad about yourself - or that you are a failure or have let yourself or your family down Not at all   Trouble concentrating on things such as school, work, reading, or watching TV Not at all   Moving or speaking so slowly that other people could have noticed; or the opposite being so fidgety that others notice Not at all   Thoughts of being better off dead, or hurting yourself in some way Not at all   PHQ 9 Score 0   How difficult have these problems made it for you to do your work, take care of your home and get along with others Not difficult at all       Learning Assessment:  Learning Assessment 5/14/2021   PRIMARY LEARNER Patient   HIGHEST LEVEL OF EDUCATION - PRIMARY LEARNER  [facility] PRIMARY LEARNER NONE   CO-LEARNER CAREGIVER -   PRIMARY LANGUAGE ENGLISH   LEARNER PREFERENCE PRIMARY LISTENING     -     -     -     -   ANSWERED BY patient   RELATIONSHIP SELF       Abuse Screening:  Abuse Screening Questionnaire 5/2/2014   Do you ever feel afraid of your partner? N   Are you in a relationship with someone who physically or mentally threatens you? N   Is it safe for you to go home? Y       Fall Risk  Fall Risk Assessment, last 12 mths 5/2/2014   Able to walk? Yes   Fall in past 12 months? No       Health Maintenance reviewed and discussed and ordered per Provider.     Health Maintenance Due   Topic Date Due    Pneumococcal 0-64 years (1 of 2 - PPSV23) Never done    COVID-19 Vaccine (1) Never done    DTaP/Tdap/Td series (1 - Tdap) Never done    Shingrix Vaccine Age 50> (1 of 2) Never done    Low dose CT lung screening  Never done   . Coordination of Care:  1. Have you been to the ER, urgent care clinic since your last visit? Hospitalized since your last visit? no    2. Have you seen or consulted any other health care providers outside of the [address] [place] [address] since your last visit? Include any pap smears or colon screening.  no

## 2021-07-30 NOTE — TELEPHONE ENCOUNTER
Approved today  PA Case: [id number], Status: Approved, Coverage Starts on: 7/30/2021 12:00:00 AM, Coverage Ends on: 7/30/2022 12:00:00 AM.

## 2021-08-09 ENCOUNTER — LAB ONLY (OUTPATIENT)
Dept: FAMILY MEDICINE CLINIC | Age: 56
End: 2021-08-09
Payer: MEDICAID

## 2021-08-09 DIAGNOSIS — Z01.89 ENCOUNTER FOR LABORATORY TEST: Primary | ICD-10-CM

## 2021-08-09 PROCEDURE — 36415 COLL VENOUS BLD VENIPUNCTURE: CPT | Performed by: FAMILY MEDICINE

## 2021-08-09 NOTE — PROGRESS NOTES
Patient came into lab for venipuncture. Patient tolerated well. Patient labs was ordered by PCP. Patient used right arm. Patient didn't have any questions or concerns.

## 2021-08-11 LAB
ALBUMIN SERPL-MCNC: 4 G/DL (ref 3.8–4.9)
ALBUMIN/GLOB SERPL: 1.1 {RATIO} (ref 1.2–2.2)
ALP SERPL-CCNC: 86 IU/L (ref 48–121)
ALT SERPL-CCNC: 11 IU/L (ref 0–44)
AST SERPL-CCNC: 18 IU/L (ref 0–40)
BILIRUB SERPL-MCNC: <0.2 MG/DL (ref 0–1.2)
BUN SERPL-MCNC: 10 MG/DL (ref 6–24)
BUN/CREAT SERPL: 13 (ref 9–20)
CALCIUM SERPL-MCNC: 9.6 MG/DL (ref 8.7–10.2)
CHLORIDE SERPL-SCNC: 99 MMOL/L (ref 96–106)
CHOLEST SERPL-MCNC: 148 MG/DL (ref 100–199)
CO2 SERPL-SCNC: 22 MMOL/L (ref 20–29)
CREAT SERPL-MCNC: 0.76 MG/DL (ref 0.76–1.27)
ERYTHROCYTE [DISTWIDTH] IN BLOOD BY AUTOMATED COUNT: 15.7 % (ref 11.6–15.4)
GLOBULIN SER CALC-MCNC: 3.6 G/DL (ref 1.5–4.5)
GLUCOSE SERPL-MCNC: 91 MG/DL (ref 65–99)
HCT VFR BLD AUTO: 36.8 % (ref 37.5–51)
HDLC SERPL-MCNC: 66 MG/DL
HGB BLD-MCNC: 11.9 G/DL (ref 13–17.7)
IMP & REVIEW OF LAB RESULTS: NORMAL
LDLC SERPL CALC-MCNC: 65 MG/DL (ref 0–99)
MCH RBC QN AUTO: 27.5 PG (ref 26.6–33)
MCHC RBC AUTO-ENTMCNC: 32.3 G/DL (ref 31.5–35.7)
MCV RBC AUTO: 85 FL (ref 79–97)
PLATELET # BLD AUTO: 249 X10E3/UL (ref 150–450)
POTASSIUM SERPL-SCNC: 3.7 MMOL/L (ref 3.5–5.2)
PROT SERPL-MCNC: 7.6 G/DL (ref 6–8.5)
RBC # BLD AUTO: 4.33 X10E6/UL (ref 4.14–5.8)
SODIUM SERPL-SCNC: 137 MMOL/L (ref 134–144)
TRIGL SERPL-MCNC: 90 MG/DL (ref 0–149)
VLDLC SERPL CALC-MCNC: 17 MG/DL (ref 5–40)
WBC # BLD AUTO: 9 X10E3/UL (ref 3.4–10.8)

## 2021-08-11 NOTE — PROGRESS NOTES
AMBULATORY PROGRESS NOTE      Patient: Gustavo Stratton             MRN: [id number]     SSN: [id number] Body mass index is 25.23 kg/m². YOB: 1965     AGE: 60 y.o. EX: male    PCP: Dana Rush NP       IMPRESSION //  DIAGNOSIS AND TREATMENT PLAN        Gustavo Stratton has a diagnosis of:        He will see his rheumatologist, on the 25th of this month of August 2021. Medrol Dosepak to be started, with Pepcid. Stop Celebrex while you are taking the Medrol Dosepak. Resume the Celebrex, in 1 week, as the Medrol Dosepak is for 5 days. He still has persistent swelling to his hindfoot, and midfoot, is in a short cam walker boot on this right side. Clinically no collapse of his ankle or hindfoot. DIAGNOSES    1. Inflammatory arthritis    2. Primary osteoarthritis of right foot    3. Primary osteoarthritis of right ankle        Orders Placed This Encounter    methylPREDNISolone (MEDROL DOSEPACK) 4 mg tablet     Sig: Per dose pack instructions     Dispense:  1 Dose Pack     Refill:  0    famotidine (PEPCID) 40 mg tablet     Sig: Take 1 Tablet by mouth daily. Dispense:  30 Tablet     Refill:  1        PLAN:    1. Rx of Medrol Dosepak and Pepcid  2. Discontinue use of Celebrex while on Medrol Dosepak     He will see his rheumatologist, on the 25th of this month of August 2021. Medrol Dosepak to be started, with Pepcid. Stop Celebrex while you are taking the Medrol Dosepak. Resume the Celebrex, in 1 week, as the Medrol Dosepak is for 5 days. RTO-  1 month    Gustavo Stratton  expresses understanding of the diagnosis, treatment plan, and all of their proposed questions were answered to their satisfaction. Patient education has been provided re the diagnoses.          HPI //  [facility], [facility] Jovani Churchill IS A 60 y.o. male who is a/an  established patient, presenting to my outpatient office for evaluation of  the following chief complaint(s):     Chief Complaint   Patient presents with    Foot Pain     right     At LOV presented w/ right ankle and foot pain. Refered to Boni Aguiar and Estelle Villalobos. Advised pt to conitnue wearing short CAm walker boot. Rx of Celebrex for right ankle and foot pain. Since LOV continues to endorse right ankle and foot pain. Notes pain has worsen since LOV. Patient has been compliant w/ short CAM walker boot and Celebrex. Reports Celebrex has not alleviated right ankle and foot pain. Apt. w/ Dr. Boni Aguiar on 8/25/2021. Visit Vitals  Temp 97.9 °F (36.6 °C)   Ht 6' (1.829 m)   BMI 25.23 kg/m²       Appearance: Alert, well appearing and pleasant patient who is in no distress, oriented to person, place/time, and who follows commands. This patient is accompanied in the examination room by his  self. There is signs of: no dementia  Psychiatric: Affect/mood are appropriate. Speech normal in context and clarity, memory intact grossly, no involuntary movements - tremors. Patient arrives to office via: with assistive device: Short CAM walker boot  H EENT (2): Head normocephalic & atraumatic. Eye: pupils are round// EOM are intact // Neck: ROM WNL  // Hearings Intact   Respiratory: Breathing non labored      ANKLE/FOOT right     Gait: uses assistive device   Short CAM walker boot  Tenderness: moderate  midfoot / HF regions  Cutaneous: diffuse swelling to midfoot / HF regions  Joint Motion: limited hindfoot and ankle motion  Joint / Tendon Stability: No Ankle or Subtalar instability or joint laxity. No peroneal sublux ability or dislocation  Alignment: neutral Hindfoot,    Neuro Motor/Sensory: NL/NL  Vascular: NL foot/ankle pulses  Lymphatics: No extremity lymphedema, No calf swelling, no tenderness to calf muscles. CHART REVIEW     Sterling Sandoval has been experiencing pain and discomfort confirmed as outlined in the pain assessment outlined below.      was reviewed by Marybeth Camarena MD on 8/16/2021. Pain Assessment  8/13/2021   Location of Pain Wrist   Pain Location Comment -   Location Modifiers Right;Left   Severity of Pain 2   Quality of Pain -   Quality of Pain Comment -   Duration of Pain -   Frequency of Pain Constant   Date Pain First Started -   Date Pain First Started Comment -   Aggravating Factors -   Aggravating Factors Comment -   Limiting Behavior -   Relieving Factors Nothing   Relieving Factors Comment -   Result of Injury -        Rhea Zhao  has a past medical history of Arthritis, Chronic pain, Contact dermatitis and other eczema, due to unspecified cause, Depression, Headache(784.0), Hypertension, Rheumatoid arthritis(714.0) (1983), Rheumatoid arthritis(714.0), and Seasonal allergic rhinitis. He also has no past medical history of Abuse, Anemia NEC, Arrhythmia, Asthma, CAD (coronary artery disease), Calculus of kidney, Cancer ([facility] [address] Chronic kidney disease, Congestive heart failure, unspecified, COPD, Diabetes ([facility] [address] GERD (gastroesophageal reflux disease), Hypercholesterolemia, Liver disease, Psychotic disorder ([facility] [address] PUD (peptic ulcer disease), Seizures ([facility] [address] Stroke ([facility] [address] Thromboembolus ([facility] [address] Thyroid disease, or Trauma. Patients is employed at:         Past Medical History:   Diagnosis Date    Arthritis     Chronic pain     joints due to Rheumatoid Arthritis    Contact dermatitis and other eczema, due to unspecified cause     dry skin in scalp    Depression     Headache(784.0)     Hypertension     Rheumatoid arthritis(714.0) 1983    Rheumatoid arthritis(714.0)     Seasonal allergic rhinitis      Past Surgical History:   Procedure Laterality Date    HX CYST REMOVAL  1983    large cyst removed on left shoulder     Current Outpatient Medications   Medication Sig    methylPREDNISolone (MEDROL DOSEPACK) 4 mg tablet Per dose pack instructions    famotidine (PEPCID) 40 mg tablet Take 1 Tablet by mouth daily.     fluticasone propionate (FLONASE) 50 mcg/actuation nasal spray 2 Sprays by Both Nostrils route daily.  allopurinoL (ZYLOPRIM) 300 mg tablet Take 1 Tablet by mouth daily.  colchicine 0.6 mg tablet Take 1 Tablet by mouth daily.  celecoxib (CELEBREX) 200 mg capsule Take 1 Capsule by mouth daily for 90 days.  ferrous sulfate 325 mg (65 mg iron) tablet     doxycycline (VIBRAMYCIN) 100 mg capsule     doxycycline (MONODOX) 100 mg capsule Take 100 mg by mouth two (2) times a day.  FeroSul 325 mg (65 mg iron) tablet     sildenafil citrate (VIAGRA) 100 mg tablet TAKE ONE TABLET BY MOUTH ONE HOUR PRIOR TO SEXUAL ACTIVITY - MAXIMUM DAILY DOSE: 100MG    hydroCHLOROthiazide (HYDRODIURIL) 25 mg tablet     cetirizine (ZYRTEC) 10 mg tablet     amLODIPine (NORVASC) 10 mg tablet Take 1 Tab by mouth daily. Indications: hypertension     No current facility-administered medications for this visit. Allergies   Allergen Reactions    Lisinopril Anaphylaxis     Patient experienced throat swelling and respiratory distress as a result.        Social History     Occupational History    Not on file   Tobacco Use    Smoking status: Current Every Day Smoker     Packs/day: 1.00     Years: 25.00     Pack years: 25.00     Types: Cigarettes    Smokeless tobacco: Never Used   Vaping Use    Vaping Use: Never assessed   Substance and Sexual Activity    Alcohol use: Yes     Comment: occasionally    Drug use: Not Currently     Types: OTC, Prescription    Sexual activity: Yes     Partners: Female     Family History   Problem Relation Age of Onset    Emphysema Mother    [employer] Arthritis-rheumatoid Mother     Emphysema Father     Arthritis-rheumatoid Father     Cancer Sister 35        breast    Liver Disease Brother     Thyroid Disease Brother         DIAGNOSTIC LAB DATA      Lab Results   Component Value Date/Time    Hemoglobin A1c 4.6 (L) 04/28/2021 10:03 AM    Hemoglobin A1c 4.6 (L) 09/28/2017 10:16 AM    Hemoglobin A1c 4.8 11/16/2016 10:47 AM    Hemoglobin A1c, External 4.6 04/29/2021 12:00 AM    //   Lab Results   Component Value Date/Time    Glucose 91 08/09/2021 08:59 AM        Lab Results   Component Value Date/Time    Hemoglobin A1c, External 4.6 04/29/2021 12:00 AM         Lab Results   Component Value Date/Time    VITAMIN D, 25-HYDROXY 29.8 (L) 09/28/2017 10:16 AM         REVIEW OF SYSTEMS : 8/16/2021  ALL BELOW ARE Negative except : SEE HPI     All other systems reviewed and are negative. 12 point review of systems otherwise negative unless noted in HPI. [facility] INTERPRETATION BY RADIOLOGIST     Nettie, [employer] [facility] - 07/15/2021  2:59 PM EDT   Formatting of this note might be different from the original.   EXAM: MRI OF THE RIGHT ANKLE     CLINICAL INDICATIONS/HISTORY: Right ankle pain and swelling for the past 18 months     COMPARISON: Right foot MRI same day, right ankle radiographs 5/14/2021 and 1/24/2020     TECHNIQUE: Sagittal T1 and STIR; axial PD and T2 with fat saturation; coronal T2 with fat saturation and axial oblique PD images of the ankle were obtained.     _______________     FINDINGS:     ANKLE LIGAMENTS:   Anterior and posterior inferior tibiofibular ligaments and syndesmosis: Intact   Anterior and posterior talofibular and the calcaneofibular ligaments: Intact   Deltoid and spring ligaments: Intact     ANKLE AND FOOT TENDONS:   Peroneus longus and brevis tendons: Peroneus longus remains normal in caliber and signal intensity throughout. There is moderate tendinosis within the peroneus brevis tendon beginning just beyond the level of the fibula and extending around the level of the peroneal tubercle. Tibialis posterior tendon: Intact and normal in signal intensity. Flexor digitorum longus and flexor hallucis longus tendons: Intact and tendon remains normal in signal intensity. Minimal tenosynovitis. Extensor tendons: Intact and normal in signal intensity.    Achilles tendon: Achilles tendon is normal in morphology and signal intensity.  Kager's fat pad is unremarkable.  No retrocalcaneal bursitis. OSSEOUS:   Ankle: There is normal marrow signal intensity throughout the distal right tibia, distal right fibula, and all of the talar dome. Small to moderate-sized, heterogeneous right ankle joint effusion with several regions of peripheral synovitis. No partial or full thickness cartilage defect. No osteochondral lesion. No fracture or contusion. Hindfoot: Mild patchy heterogeneous T1 and STIR signal abnormality is seen throughout subchondral distributions of the talar anterior process. Normal maintained marrow signal intensity throughout all of the calcaneus. No fracture or contusion. No hindfoot coalition.  No degenerative osteoarthropathy of the subtalar joints. Midfoot: Prominent diffuse marrow signal abnormality is seen throughout all of the navicular and relatively diffusely throughout first, second, and third cuneiforms. There is also mild signal abnormality throughout the second and third metatarsal bases. Normal marrow signal intensity throughout visualized portions of the first, fourth, and fifth metatarsals. Normal marrow signal intensity is also seen throughout all of the cuboid. There is moderate intertarsal reactive synovitis but no significant joint fluid. Scattered regions of full-thickness cartilage loss, subchondral sclerosis, and subchondral edema are also present. Currently there is no fragmentation, collapse or subluxation throughout these intertarsal articulations. No fracture or contusion. OTHER:   Sinus tarsi: Normal fat signal intensity. Plantar fascia: Intact, normal in caliber and signal intensity. Tarsal tunnel: Normal in appearance. Regional soft tissues: No abnormal soft tissue fluid collections. _______________     IMPRESSION     1.  Diffuse marrow signal abnormality is seen throughout the navicular, cuneiforms, talar anterior process, and second and third metatarsal bases. Extent of synovitis throughout this distribution and subacute/chronic partially sclerotic appearance would seem to favor an inflammatory arthropathy in etiology, such as rheumatoid arthritis. There is also a small to moderate-sized right ankle joint effusion with prominent synovitis which would similarly favor an inflammatory arthropathy. Currently this does not have the typical distribution of septic arthritis nor a neuropathic arthropathy. 2. Moderate focal peroneus brevis tendinosis. Minimal flexor tenosynovitis. Signed By: Nancy Azevedo MD on 7/15/2021 2:57 PM      Date: 07/15/21   Received From: [address]     Encounter Summary               MR FOOT WO IV CON RIGHT     Impression     :     Only partially visualized on this exam is diffuse signal abnormality throughout the right midfoot, minimally involving second and third metatarsal bases, better evaluated on the concurrent right ankle MRI, reported separately. There are no acute or suspicious findings throughout the right forefoot. Signed By: Nancy Azevedo MD on 7/15/2021 2:28 PM  Narrative     EXAM: MRI OF THE RIGHT FOREFOOT     CLINICAL INDICATION/HISTORY: Right foot pain and swelling for the past 18 months     COMPARISON: Right ankle MRI same day, right ankle and right foot radiographs 1/24/2020     TECHNIQUE:  Sagittal T1 and STIR; coronal T1 and T2 with fat saturation; axial T1 and T2 with fat saturation sequences of the forefoot were obtained.     _______________     FINDINGS:     SOFT TISSUES: Subcutaneous and deep soft tissues are within normal limits. OSSEOUS/JOINTS:     >  Patchy subchondral edema is seen within the second and third metatarsal bases.     >  There is otherwise normal marrow signal intensity throughout the first, fourth, and fifth metatarsals and all of the phalanges. No malalignment. No hallux valgus or metatarsus primus varus angulation.  No joint effusion throughout the right forefoot.     >  Only partially visualized on this exam is heterogeneous marrow signal abnormality throughout the midfoot, predominantly involving second and third cuneiforms and navicular. All of these findings are better evaluated on the concurrent right ankle MRI, reported separately. INTRINSIC MUSCULATURE: Muscular and tendinous structures of the visualized mid and forefoot unremarkable. LIGAMENTS:  Ligamentous structures, to include the Lisfranc ligament complex, are within normal limits. OTHER: The distal portions of the medial and lateral fascial aponeurosis are unremarkable.     _______________  Other Result Text     Interface, [employer] [facility] - 07/15/2021  2:30 PM EDT   Formatting of this note might be different from the original.   EXAM: MRI OF THE RIGHT FOREFOOT     CLINICAL INDICATION/HISTORY: Right foot pain and swelling for the past 18 months     COMPARISON: Right ankle MRI same day, right ankle and right foot radiographs 1/24/2020     TECHNIQUE:  Sagittal T1 and STIR; coronal T1 and T2 with fat saturation; axial T1 and T2 with fat saturation sequences of the forefoot were obtained.     _______________     FINDINGS:     SOFT TISSUES: Subcutaneous and deep soft tissues are within normal limits. OSSEOUS/JOINTS:     >  Patchy subchondral edema is seen within the second and third metatarsal bases.     >  There is otherwise normal marrow signal intensity throughout the first, fourth, and fifth metatarsals and all of the phalanges. No malalignment. No hallux valgus or metatarsus primus varus angulation. No joint effusion throughout the right forefoot.     >  Only partially visualized on this exam is heterogeneous marrow signal abnormality throughout the midfoot, predominantly involving second and third cuneiforms and navicular. All of these findings are better evaluated on the concurrent right ankle MRI, reported separately.      INTRINSIC MUSCULATURE: Muscular and tendinous structures of the visualized mid and forefoot unremarkable. LIGAMENTS:  Ligamentous structures, to include the Lisfranc ligament complex, are within normal limits. OTHER: The distal portions of the medial and lateral fascial aponeurosis are unremarkable.     _______________     IMPRESSION:     Only partially visualized on this exam is diffuse signal abnormality throughout the right midfoot, minimally involving second and third metatarsal bases, better evaluated on the concurrent right ankle MRI, reported separately. There are no acute or suspicious findings throughout the right forefoot. Signed By: Ilana Rodriguez MD on 7/15/2021 2:28 PM      Date: 07/15/21   Received From: [address]                   On this date 08/16/2021 I have spent 30 minutes reviewing previous notes, test results and face to face with the patient discussing the diagnosis and importance of compliance with the treatment plan as well as documenting on the day of the visit. An electronic signature was used to authenticate this note. Disclaimer: Sections of this note are dictated using utilizing voice recognition software, which may have resulted in some phonetic based errors in grammar and contents. Even though attempts were made to correct all the mistakes, some may have been missed, and remained in the body of the document. If questions arise, please contact our department. Mia Cruz may have a reminder for a \"due or due soon\" health maintenance. I have asked that he contact his primary care provider for follow-up on this health maintenance.     Mickie Schaumann, as dictated by  Katherine Stanley MD  8/16/2021  7:46 AM

## 2021-08-13 ENCOUNTER — OFFICE VISIT (OUTPATIENT)
Dept: ORTHOPEDIC SURGERY | Age: 56
End: 2021-08-13
Payer: MEDICAID

## 2021-08-13 VITALS — BODY MASS INDEX: 25.23 KG/M2 | HEIGHT: 72 IN | TEMPERATURE: 97.8 F

## 2021-08-13 DIAGNOSIS — M05.732 RHEUMATOID ARTHRITIS INVOLVING LEFT WRIST WITH POSITIVE RHEUMATOID FACTOR (HCC): ICD-10-CM

## 2021-08-13 DIAGNOSIS — M05.731 RHEUMATOID ARTHRITIS INVOLVING RIGHT WRIST WITH POSITIVE RHEUMATOID FACTOR (HCC): Primary | ICD-10-CM

## 2021-08-13 PROCEDURE — 73110 X-RAY EXAM OF WRIST: CPT | Performed by: ORTHOPAEDIC SURGERY

## 2021-08-13 PROCEDURE — 99214 OFFICE O/P EST MOD 30 MIN: CPT | Performed by: ORTHOPAEDIC SURGERY

## 2021-08-13 NOTE — PROGRESS NOTES
Jorge Shore is a 60 y.o. male right handed unspecified employment. Worker's Compensation and legal considerations: none filed. Vitals:    08/13/21 1005   Temp: 97.8 °F (36.6 °C)   Height: 6' (1.829 m)   PainSc:   7   PainLoc: Wrist           Chief Complaint   Patient presents with    Wrist Pain     bilateral         HPI: Patient presents today with complaints of chronic bilateral wrist pain for many decades. He has an appointment with a rheumatologist coming up. Date of onset: Chronic    Injury: No    Prior Treatment:  No    Numbness/ Tingling: No      ROS: Review of Systems - General ROS: negative  Psychological ROS: negative  ENT ROS: negative  Allergy and Immunology ROS: negative  Hematological and Lymphatic ROS: negative  Respiratory ROS: no cough, shortness of breath, or wheezing  Cardiovascular ROS: no chest pain or dyspnea on exertion  Gastrointestinal ROS: no abdominal pain, change in bowel habits, or black or bloody stools  Musculoskeletal ROS: positive for - pain in wrist - bilateral and swelling in wrist - bilateral  Neurological ROS: negative  Dermatological ROS: negative    Past Medical History:   Diagnosis Date    Arthritis     Chronic pain     joints due to Rheumatoid Arthritis    Contact dermatitis and other eczema, due to unspecified cause     dry skin in scalp    Depression     Headache(784.0)     Hypertension     Rheumatoid arthritis(714.0) 1983    Rheumatoid arthritis(714.0)     Seasonal allergic rhinitis        Past Surgical History:   Procedure Laterality Date    HX CYST REMOVAL  1983    large cyst removed on left shoulder       Current Outpatient Medications   Medication Sig Dispense Refill    fluticasone propionate (FLONASE) 50 mcg/actuation nasal spray 2 Sprays by Both Nostrils route daily. 1 Bottle 5    allopurinoL (ZYLOPRIM) 300 mg tablet Take 1 Tablet by mouth daily. 90 Tablet 1    colchicine 0.6 mg tablet Take 1 Tablet by mouth daily.  90 Tablet 1    celecoxib (CELEBREX) 200 mg capsule Take 1 Capsule by mouth daily for 90 days. 30 Capsule 2    ferrous sulfate 325 mg (65 mg iron) tablet       doxycycline (VIBRAMYCIN) 100 mg capsule       doxycycline (MONODOX) 100 mg capsule Take 100 mg by mouth two (2) times a day.  FeroSul 325 mg (65 mg iron) tablet       sildenafil citrate (VIAGRA) 100 mg tablet TAKE ONE TABLET BY MOUTH ONE HOUR PRIOR TO SEXUAL ACTIVITY - MAXIMUM DAILY DOSE: 100MG 30 Tab 6    hydroCHLOROthiazide (HYDRODIURIL) 25 mg tablet       cetirizine (ZYRTEC) 10 mg tablet       amLODIPine (NORVASC) 10 mg tablet Take 1 Tab by mouth daily. Indications: hypertension 90 Tab 3       Allergies   Allergen Reactions    Lisinopril Anaphylaxis     Patient experienced throat swelling and respiratory distress as a result. PE:     Physical Exam  Vitals and nursing note reviewed. Constitutional:       General: He is not in acute distress. Appearance: Normal appearance. He is not ill-appearing. Cardiovascular:      Pulses: Normal pulses. Pulmonary:      Effort: Pulmonary effort is normal. No respiratory distress. Musculoskeletal:         General: Swelling and tenderness present. No deformity or signs of injury. Normal range of motion. Cervical back: Normal range of motion and neck supple. Right lower leg: No edema. Left lower leg: No edema. Skin:     General: Skin is warm and dry. Capillary Refill: Capillary refill takes less than 2 seconds. Findings: No bruising or erythema. Neurological:      General: No focal deficit present. Mental Status: He is alert and oriented to person, place, and time. Psychiatric:         Mood and Affect: Mood normal.         Behavior: Behavior normal.            Bilateral wrist: There is edema noted about the wrist joints dorsally. There is also tenderness to palpation. Range of motion is limited due to pain and stiffness. Neurovascularly intact.       Imagin2021 3 views of bilateral wrists is significant for severe erosive arthritis with near autofusion of most of the joints of the wrist.        ICD-10-CM ICD-9-CM    1. Rheumatoid arthritis involving right wrist with positive rheumatoid factor ([facility])  M05.731 714.0 AMB POC XRAY, WRIST; COMPLETE, 3+ VIE      AMB SUPPLY ORDER   2. Rheumatoid arthritis involving left wrist with positive rheumatoid factor ([facility])  M05.732 714.0 AMB POC XRAY, WRIST; COMPLETE, 3+ VIE      AMB SUPPLY ORDER         Plan:     I have instructed the patient the importance of keeping his rheumatology appointment to be started on some antirheumatic agents. I have also given him wrist braces today. Since he is getting his Covid shot today we have deferred an injection and we will see him back in 4 weeks for this. Follow-up and Dispositions    · Return in about 4 weeks (around 9/10/2021) for Reevaluation and bilateral wrist injections.           Plan was reviewed with patient, who verbalized agreement and understanding of the plan

## 2021-08-16 ENCOUNTER — OFFICE VISIT (OUTPATIENT)
Dept: ORTHOPEDIC SURGERY | Age: 56
End: 2021-08-16
Payer: MEDICAID

## 2021-08-16 VITALS — HEIGHT: 72 IN | TEMPERATURE: 97.9 F | BODY MASS INDEX: 25.23 KG/M2

## 2021-08-16 DIAGNOSIS — M19.071 PRIMARY OSTEOARTHRITIS OF RIGHT ANKLE: ICD-10-CM

## 2021-08-16 DIAGNOSIS — M19.071 PRIMARY OSTEOARTHRITIS OF RIGHT FOOT: ICD-10-CM

## 2021-08-16 DIAGNOSIS — M19.90 INFLAMMATORY ARTHRITIS: Primary | ICD-10-CM

## 2021-08-16 PROCEDURE — 99213 OFFICE O/P EST LOW 20 MIN: CPT | Performed by: ORTHOPAEDIC SURGERY

## 2021-08-16 RX ORDER — FAMOTIDINE 40 MG/1
40 TABLET, FILM COATED ORAL DAILY
Qty: 30 TABLET | Refills: 1 | Status: SHIPPED | OUTPATIENT
Start: 2021-08-16 | End: 2021-10-12

## 2021-08-16 RX ORDER — METHYLPREDNISOLONE 4 MG/1
TABLET ORAL
Qty: 1 DOSE PACK | Refills: 0 | Status: SHIPPED | OUTPATIENT
Start: 2021-08-16 | End: 2022-01-27 | Stop reason: ALTCHOICE

## 2021-08-25 ENCOUNTER — TELEPHONE (OUTPATIENT)
Dept: ORTHOPEDIC SURGERY | Age: 56
End: 2021-08-25

## 2021-08-25 NOTE — TELEPHONE ENCOUNTER
Patient states that Dr. Oc Siddiqi was going give him an airsport brace that fits inside of his shoe.

## 2021-08-25 NOTE — TELEPHONE ENCOUNTER
Patient called to request for \"shoe\" to go under boot. States in the past he had a \"shoe\" to go under his boot and that helped a lot. States boot is very inconvenient and he as a  to attend.  Requesting call back    [phone]

## 2021-08-26 NOTE — TELEPHONE ENCOUNTER
Cholo to provide AS/AC brace as requested. Thalia Boyd [facility], RUTH, PA-C  8/26/2021   3:50 PM

## 2021-08-26 NOTE — TELEPHONE ENCOUNTER
Patient called again and said that it is an   Ankle Brace that he needs. That he never received one from [facility] and that his next appt with [facility] is not until 9/20/2021. Patient is requesting a call back once we have the brace or order for him to . Patient tel. [phone]. Note: patient see  at the [facility] loc.

## 2021-08-27 NOTE — TELEPHONE ENCOUNTER
Tried to call patient, a female answered and stated that Mr. Jose Velasquez is not in at this time. I asked female to let Mr. Jose Velasquez know I called back from [place]. If patient calls back, please find out if he wants to  airsport brace at our [facility] location or  location.   If he chooses to  at [facility] location we will be there Monday from 8-11 AM.

## 2021-09-13 NOTE — PROGRESS NOTES
AMBULATORY PROGRESS NOTE      Patient: Melanee Nageotte             MRN: [id number]     SSN: [id number] Body mass index is 25.23 kg/m². YOB: 1965     AGE: 60 y.o. EX: male    PCP: Samreen Hannah NP       IMPRESSION //  DIAGNOSIS AND TREATMENT PLAN        Melanee Nageotte has a diagnosis of:      He currently has multiple joint involvement, elbows and knees, and his right foot and ankle. Dr. Alma Aguiar, his rheumatologist.//    No surgical mention, is required, this current time, his right foot and ankle. He will be given a hard soled shoe, his [place] shoe was placed on him today. I would prescription for tramadol to take 2 p.o. 3 times daily as needed pain. He is sent back to and will be seeing his rheumatologist, in the next 2 weeks. Hopefully she will put him on a DMA RD, rheumatologic medication to address his systemic inflammatory arthropathy, elbows, wrists, knees, right foot/ankle. DIAGNOSES    1. Inflammatory arthritis    2. Primary osteoarthritis of right foot    3. Primary osteoarthritis of right ankle    4. Rheumatoid arthritis involving right wrist with positive rheumatoid factor (HCC)    5. Rheumatoid arthritis involving left wrist with positive rheumatoid factor ([facility] [address]        Orders Placed This Encounter    Generic Supply Order     Soled shoe, hard soled shoe was placed on his right foot today DME from my office    traMADoL (ULTRAM) 50 mg tablet     Sig: Take 2 Tablets by mouth every eight (8) hours as needed for Pain for up to 7 days. Max Daily Amount: 300 mg. Dispense:  42 Tablet     Refill:  0        PLAN:    1. He plans on seeing Jarad Morrison on 10/6/2021.  2. Wean off R short CAM walker boot. 3. AVS: Dr.Comoort Maggie, [employer] : at [employer] ,[employer] at [employer]  4. Renew of Tramadol  5.   DMS: R Hard sole shoe      RTO-  3 month RTO    Prashanthanabel Babb  expresses understanding of the diagnosis, treatment plan, and all of their proposed questions were answered to their satisfaction. Patient education has been provided re the diagnoses. HPI //  Elroy Churchill IS A 60 y.o. male who is a/an  established patient, presenting to my outpatient office for evaluation of  the following chief complaint(s):     Chief Complaint   Patient presents with    Foot Pain     right       At LOV presented right foot pain. Rx of Medrol Dosepak and Pepcid. Discontinue use of Celebrex while on Medrol Dosepak. Since LOV pt presents today w/ right foot pain/swelling. He states the medrol Dosepak I prescribed provided minimal relief. He also reports pain and swelling of both of his elbows and knees. He saw  Dr. Court Dunbar , rheumatologist ,at [facility]. She ordered blood work , several XRs, and changed a medication I previously prescribed. He has another apt on 10/6/2021. Visit Vitals  Temp 97.4 °F (36.3 °C)   Wt 186 lb (84.4 kg)   BMI 25.23 kg/m²       Appearance: Alert, well appearing and pleasant patient who is in no distress, oriented to person, place/time, and who follows commands. This patient is accompanied in the examination room by his  self. There is signs of: no dementia  Psychiatric: Affect/mood are appropriate. Speech normal in context and clarity, memory intact grossly, no involuntary movements - tremors. Patient arrives to office via: with assistive device: R Short CAM walker boot  H EENT (2): Head normocephalic & atraumatic. Eye: pupils are round// EOM are intact // Neck: ROM WNL  // Hearings Intact   Respiratory: Breathing non labored     ANKLE/FOOT right    Gait: uses assistive device ( R Short CAM walker boot)  Tenderness: moderate    anterolateral ankle  posterolateral ankle  dorsal foot      Cutaneous: mild swelling of the dorsal part of the right foot  Joint Motion:   WNL ankle//decreased inversion   Joint / Tendon Stability: No Ankle or Subtalar instability or joint laxity. No peroneal sublux ability or dislocation  Alignment: neutral Hindfoot,    Neuro Motor/Sensory: NL/NL  Vascular: NL foot/ankle pulses,   Lymphatics: No extremity lymphedema, No calf swelling, no tenderness to calf muscles. CHART REVIEW     Alberta Molina has been experiencing pain and discomfort confirmed as outlined in the pain assessment outlined below.  was reviewed by Yuliana Thompson MD on 9/20/2021. Pain Assessment  9/20/2021   Location of Pain Foot   Pain Location Comment -   Location Modifiers Right   Severity of Pain 9   Quality of Pain Dull   Quality of Pain Comment -   Duration of Pain -   Frequency of Pain Constant   Date Pain First Started -   Date Pain First Started Comment -   Aggravating Factors Standing;Walking   Aggravating Factors Comment -   Limiting Behavior Some   Relieving Factors Nothing   Relieving Factors Comment -   Result of Injury -        Alberta Molina  has a past medical history of Arthritis, Chronic pain, Contact dermatitis and other eczema, due to unspecified cause, Depression, Headache(784.0), Hypertension, Rheumatoid arthritis(714.0) (1983), Rheumatoid arthritis(714.0), and Seasonal allergic rhinitis. He also has no past medical history of Abuse, Anemia NEC, Arrhythmia, Asthma, CAD (coronary artery disease), Calculus of kidney, Cancer ([address] Chronic kidney disease, Congestive heart failure, unspecified, COPD, Diabetes ([address] GERD (gastroesophageal reflux disease), Hypercholesterolemia, Liver disease, Psychotic disorder ([address] PUD (peptic ulcer disease), Seizures ([address] Stroke ([address] Thromboembolus ([address] Thyroid disease, or Trauma.      Patients is employed at:         Past Medical History:   Diagnosis Date    Arthritis     Chronic pain     joints due to Rheumatoid Arthritis    Contact dermatitis and other eczema, due to unspecified cause     dry skin in scalp    Depression     Headache(784.0)     Hypertension     Rheumatoid arthritis(714.0) 1983    Rheumatoid arthritis(714.0)     Seasonal allergic rhinitis      Past Surgical History:   Procedure Laterality Date    HX CYST REMOVAL  1983    large cyst removed on left shoulder     Current Outpatient Medications   Medication Sig    traMADoL (ULTRAM) 50 mg tablet Take 2 Tablets by mouth every eight (8) hours as needed for Pain for up to 7 days. Max Daily Amount: 300 mg.    sildenafil citrate (VIAGRA) 100 mg tablet TAKE ONE TABLET BY MOUTH ONE HOUR PRIOR TO SEXUAL ACTIVITY-- MAXIMUM DAILY DOSE : 100MG.  famotidine (PEPCID) 40 mg tablet Take 1 Tablet by mouth daily.  fluticasone propionate (FLONASE) 50 mcg/actuation nasal spray 2 Sprays by Both Nostrils route daily.  allopurinoL (ZYLOPRIM) 300 mg tablet Take 1 Tablet by mouth daily.  colchicine 0.6 mg tablet Take 1 Tablet by mouth daily.  celecoxib (CELEBREX) 200 mg capsule Take 1 Capsule by mouth daily for 90 days.  ferrous sulfate 325 mg (65 mg iron) tablet     doxycycline (VIBRAMYCIN) 100 mg capsule     doxycycline (MONODOX) 100 mg capsule Take 100 mg by mouth two (2) times a day.  FeroSul 325 mg (65 mg iron) tablet     hydroCHLOROthiazide (HYDRODIURIL) 25 mg tablet     cetirizine (ZYRTEC) 10 mg tablet     amLODIPine (NORVASC) 10 mg tablet Take 1 Tab by mouth daily. Indications: hypertension    methylPREDNISolone (MEDROL DOSEPACK) 4 mg tablet Per dose pack instructions (Patient not taking: Reported on 9/20/2021)     No current facility-administered medications for this visit. Allergies   Allergen Reactions    Lisinopril Anaphylaxis     Patient experienced throat swelling and respiratory distress as a result.        Social History     Occupational History    Not on file   Tobacco Use    Smoking status: Current Every Day Smoker     Packs/day: 1.00     Years: 25.00     Pack years: 25.00     Types: Cigarettes    Smokeless tobacco: Never Used   Vaping Use    Vaping Use: Never assessed   Substance and Sexual Activity    Alcohol use: Yes     Comment: occasionally    Drug use: Not Currently     Types: OTC, Prescription    Sexual activity: Yes     Partners: Female     Family History   Problem Relation Age of Onset    Emphysema Mother    Gennette Frisian Arthritis-rheumatoid Mother     Emphysema Father     Arthritis-rheumatoid Father     Cancer Sister 35        breast    Liver Disease Brother     Thyroid Disease Brother         DIAGNOSTIC LAB DATA      Lab Results   Component Value Date/Time    Hemoglobin A1c 4.6 (L) 04/28/2021 10:03 AM    Hemoglobin A1c 4.6 (L) 09/28/2017 10:16 AM    Hemoglobin A1c 4.8 11/16/2016 10:47 AM    Hemoglobin A1c, External 4.6 04/29/2021 12:00 AM    //   Lab Results   Component Value Date/Time    Glucose 91 08/09/2021 08:59 AM        Lab Results   Component Value Date/Time    Hemoglobin A1c, External 4.6 04/29/2021 12:00 AM         Lab Results   Component Value Date/Time    VITAMIN D, 25-HYDROXY 29.8 (L) 09/28/2017 10:16 AM         REVIEW OF SYSTEMS : 9/20/2021  ALL BELOW ARE Negative except : SEE HPI     All other systems reviewed and are negative. 12 point review of systems otherwise negative unless noted in HPI.       [facility]PRET[facility] BY RADIOLOGIST     [facility] IMAGING : INTERPRETATION BY RADIOLOGIST      Interface, [employer] - 07/15/2021  2:59 PM EDT   Formatting of this note might be different from the original.   EXAM: MRI OF THE RIGHT ANKLE     CLINICAL INDICATIONS/HISTORY: Right ankle pain and swelling for the past 18 months     COMPARISON: Right foot MRI same day, right ankle radiographs 5/14/2021 and 1/24/2020     TECHNIQUE: Sagittal T1 and STIR; axial PD and T2 with fat saturation; coronal T2 with fat saturation and axial oblique PD images of the ankle were obtained.     _______________     FINDINGS:     ANKLE LIGAMENTS:   Anterior and posterior inferior tibiofibular ligaments and syndesmosis: Intact   Anterior and posterior talofibular and the calcaneofibular ligaments: Intact   Deltoid and spring ligaments: Intact     ANKLE AND FOOT TENDONS:   Peroneus longus and brevis tendons: Peroneus longus remains normal in caliber and signal intensity throughout. There is moderate tendinosis within the peroneus brevis tendon beginning just beyond the level of the fibula and extending around the level of the peroneal tubercle. Tibialis posterior tendon: Intact and normal in signal intensity. Flexor digitorum longus and flexor hallucis longus tendons: Intact and tendon remains normal in signal intensity. Minimal tenosynovitis. Extensor tendons: Intact and normal in signal intensity. Achilles tendon: Achilles tendon is normal in morphology and signal intensity.  Kager's fat pad is unremarkable.  No retrocalcaneal bursitis. OSSEOUS:   Ankle: There is normal marrow signal intensity throughout the distal right tibia, distal right fibula, and all of the talar dome. Small to moderate-sized, heterogeneous right ankle joint effusion with several regions of peripheral synovitis. No partial or full thickness cartilage defect. No osteochondral lesion. No fracture or contusion. Hindfoot: Mild patchy heterogeneous T1 and STIR signal abnormality is seen throughout subchondral distributions of the talar anterior process. Normal maintained marrow signal intensity throughout all of the calcaneus. No fracture or contusion. No hindfoot coalition.  No degenerative osteoarthropathy of the subtalar joints. Midfoot: Prominent diffuse marrow signal abnormality is seen throughout all of the navicular and relatively diffusely throughout first, second, and third cuneiforms. There is also mild signal abnormality throughout the second and third metatarsal bases. Normal marrow signal intensity throughout visualized portions of the first, fourth, and fifth metatarsals. Normal marrow signal intensity is also seen throughout all of the cuboid.  There is moderate intertarsal reactive synovitis but no significant joint fluid. Scattered regions of full-thickness cartilage loss, subchondral sclerosis, and subchondral edema are also present. Currently there is no fragmentation, collapse or subluxation throughout these intertarsal articulations. No fracture or contusion. OTHER:   Sinus tarsi: Normal fat signal intensity. Plantar fascia: Intact, normal in caliber and signal intensity. Tarsal tunnel: Normal in appearance. Regional soft tissues: No abnormal soft tissue fluid collections. _______________     IMPRESSION     1. Diffuse marrow signal abnormality is seen throughout the navicular, cuneiforms, talar anterior process, and second and third metatarsal bases. Extent of synovitis throughout this distribution and subacute/chronic partially sclerotic appearance would seem to favor an inflammatory arthropathy in etiology, such as rheumatoid arthritis. There is also a small to moderate-sized right ankle joint effusion with prominent synovitis which would similarly favor an inflammatory arthropathy. Currently this does not have the typical distribution of septic arthritis nor a neuropathic arthropathy. 2. Moderate focal peroneus brevis tendinosis. Minimal flexor tenosynovitis. Signed By: Chelo Carvajal MD on 7/15/2021 2:57 PM      Date: 07/15/21   Received From: [address] [place] [address] Drive Summary               MR FOOT WO IV CON RIGHT     Impression     :     Only partially visualized on this exam is diffuse signal abnormality throughout the right midfoot, minimally involving second and third metatarsal bases, better evaluated on the concurrent right ankle MRI, reported separately. There are no acute or suspicious findings throughout the right forefoot.      Signed By: Chelo Carvajal MD on 7/15/2021 2:28 PM  Narrative     EXAM: MRI OF THE RIGHT FOREFOOT     CLINICAL INDICATION/HISTORY: Right foot pain and swelling for the past 18 months COMPARISON: Right ankle MRI same day, right ankle and right foot radiographs 1/24/2020     TECHNIQUE:  Sagittal T1 and STIR; coronal T1 and T2 with fat saturation; axial T1 and T2 with fat saturation sequences of the forefoot were obtained.     _______________     FINDINGS:     SOFT TISSUES: Subcutaneous and deep soft tissues are within normal limits. OSSEOUS/JOINTS:     >  Patchy subchondral edema is seen within the second and third metatarsal bases.     >  There is otherwise normal marrow signal intensity throughout the first, fourth, and fifth metatarsals and all of the phalanges. No malalignment. No hallux valgus or metatarsus primus varus angulation. No joint effusion throughout the right forefoot.     >  Only partially visualized on this exam is heterogeneous marrow signal abnormality throughout the midfoot, predominantly involving second and third cuneiforms and navicular. All of these findings are better evaluated on the concurrent right ankle MRI, reported separately. INTRINSIC MUSCULATURE: Muscular and tendinous structures of the visualized mid and forefoot unremarkable. LIGAMENTS:  Ligamentous structures, to include the Lisfranc ligament complex, are within normal limits.      OTHER: The distal portions of the medial and lateral fascial aponeurosis are unremarkable.     _______________  Other Result Text     Interface, [employer] [facility] - 07/15/2021  2:30 PM EDT   Formatting of this note might be different from the original.   EXAM: MRI OF THE RIGHT FOREFOOT     CLINICAL INDICATION/HISTORY: Right foot pain and swelling for the past 18 months     COMPARISON: Right ankle MRI same day, right ankle and right foot radiographs 1/24/2020     TECHNIQUE:  Sagittal T1 and STIR; coronal T1 and T2 with fat saturation; axial T1 and T2 with fat saturation sequences of the forefoot were obtained.     _______________     FINDINGS:     SOFT TISSUES: Subcutaneous and deep soft tissues are within normal limits. OSSEOUS/JOINTS:     >  Patchy subchondral edema is seen within the second and third metatarsal bases.     >  There is otherwise normal marrow signal intensity throughout the first, fourth, and fifth metatarsals and all of the phalanges. No malalignment. No hallux valgus or metatarsus primus varus angulation. No joint effusion throughout the right forefoot.     >  Only partially visualized on this exam is heterogeneous marrow signal abnormality throughout the midfoot, predominantly involving second and third cuneiforms and navicular. All of these findings are better evaluated on the concurrent right ankle MRI, reported separately. INTRINSIC MUSCULATURE: Muscular and tendinous structures of the visualized mid and forefoot unremarkable. LIGAMENTS:  Ligamentous structures, to include the Lisfranc ligament complex, are within normal limits. OTHER: The distal portions of the medial and lateral fascial aponeurosis are unremarkable.     _______________     IMPRESSION:     Only partially visualized on this exam is diffuse signal abnormality throughout the right midfoot, minimally involving second and third metatarsal bases, better evaluated on the concurrent right ankle MRI, reported separately. There are no acute or suspicious findings throughout the right forefoot. Signed By: Raphael Garcia MD on 7/15/2021 2:28 PM      Date: 07/15/21   Received From: [address] [facility]                    On this date 09/20/2021 I have spent 25 minutes reviewing previous notes, test results and face to face with the patient discussing the diagnosis and importance of compliance with the treatment plan as well as documenting on the day of the visit. An electronic signature was used to authenticate this note. Disclaimer: Sections of this note are dictated using utilizing voice recognition software, which may have resulted in some phonetic based errors in grammar and contents.  Even though attempts were made to correct all the mistakes, some may have been missed, and remained in the body of the document. If questions arise, please contact our department. Kb Oseguera may have a reminder for a \"due or due soon\" health maintenance. I have asked that he contact his primary care provider for follow-up on this health maintenance. Avis Serra,as dictated by, Abelardo Real.   9/20/2021  8:15 AM

## 2021-09-20 ENCOUNTER — OFFICE VISIT (OUTPATIENT)
Dept: ORTHOPEDIC SURGERY | Age: 56
End: 2021-09-20
Payer: MEDICAID

## 2021-09-20 VITALS — WEIGHT: 186 LBS | TEMPERATURE: 97.4 F | BODY MASS INDEX: 25.23 KG/M2

## 2021-09-20 DIAGNOSIS — M19.071 PRIMARY OSTEOARTHRITIS OF RIGHT ANKLE: ICD-10-CM

## 2021-09-20 DIAGNOSIS — M19.90 INFLAMMATORY ARTHRITIS: Primary | ICD-10-CM

## 2021-09-20 DIAGNOSIS — M05.731 RHEUMATOID ARTHRITIS INVOLVING RIGHT WRIST WITH POSITIVE RHEUMATOID FACTOR (HCC): ICD-10-CM

## 2021-09-20 DIAGNOSIS — M19.071 PRIMARY OSTEOARTHRITIS OF RIGHT FOOT: ICD-10-CM

## 2021-09-20 DIAGNOSIS — M05.732 RHEUMATOID ARTHRITIS INVOLVING LEFT WRIST WITH POSITIVE RHEUMATOID FACTOR (HCC): ICD-10-CM

## 2021-09-20 PROCEDURE — 99213 OFFICE O/P EST LOW 20 MIN: CPT | Performed by: ORTHOPAEDIC SURGERY

## 2021-09-20 RX ORDER — TRAMADOL HYDROCHLORIDE 50 MG/1
100 TABLET ORAL
Qty: 42 TABLET | Refills: 0 | Status: SHIPPED | OUTPATIENT
Start: 2021-09-20 | End: 2021-09-27

## 2021-09-20 NOTE — PATIENT INSTRUCTIONS
Dr.Comoort Maggie, [employer] : at [employer]    [employer] at [employer] \[facility]\""

## 2021-09-27 ENCOUNTER — TELEPHONE (OUTPATIENT)
Dept: FAMILY MEDICINE CLINIC | Age: 56
End: 2021-09-27

## 2021-09-27 NOTE — TELEPHONE ENCOUNTER
Please contact this pt when available. Mr Oliver Foote need advice on what he can do the the excessive hiccups that he has. He went to the ER but the medication they gave him is not working.  Please follow up as soon as you are available to do so

## 2021-10-12 ENCOUNTER — TELEPHONE (OUTPATIENT)
Dept: FAMILY MEDICINE CLINIC | Age: 56
End: 2021-10-12

## 2021-10-12 DIAGNOSIS — M19.90 INFLAMMATORY ARTHRITIS: ICD-10-CM

## 2021-10-12 DIAGNOSIS — M19.071 PRIMARY OSTEOARTHRITIS OF RIGHT ANKLE: ICD-10-CM

## 2021-10-12 DIAGNOSIS — M19.071 PRIMARY OSTEOARTHRITIS OF RIGHT FOOT: ICD-10-CM

## 2021-10-12 DIAGNOSIS — R06.6 HICCUPS: Primary | ICD-10-CM

## 2021-10-12 RX ORDER — METOCLOPRAMIDE 10 MG/1
TABLET ORAL
COMMUNITY
Start: 2021-09-26 | End: 2022-01-27 | Stop reason: ALTCHOICE

## 2021-10-12 RX ORDER — BACLOFEN 5 MG/1
5 TABLET ORAL
COMMUNITY
Start: 2021-09-28 | End: 2021-10-12 | Stop reason: SDUPTHER

## 2021-10-12 RX ORDER — BACLOFEN 5 MG/1
5 TABLET ORAL
Qty: 30 TABLET | Refills: 1 | Status: SHIPPED | OUTPATIENT
Start: 2021-10-12 | End: 2021-10-20 | Stop reason: SDUPTHER

## 2021-10-12 RX ORDER — FAMOTIDINE 40 MG/1
TABLET, FILM COATED ORAL
Qty: 30 TABLET | Refills: 1 | Status: SHIPPED | OUTPATIENT
Start: 2021-10-12 | End: 2021-10-20 | Stop reason: ALTCHOICE

## 2021-10-12 NOTE — TELEPHONE ENCOUNTER
Patient's wife called to check on the status of medication request. She states he needs it because he's hiccuping really bad. Please advise.

## 2021-10-12 NOTE — TELEPHONE ENCOUNTER
Pt wife calling again about her husbands medication for hiccups. She can be reached at [phone]. Please advise.  Thank you!!!

## 2021-10-12 NOTE — TELEPHONE ENCOUNTER
Pt wife called and stated her  needs baclofen for hiccups. She says \"She called the hospital and they told her to call there primary care physician to get a order for it. \" It can be called in to [facility] on [address] [address] [place], [place]. Please advise.  Thank you!!!

## 2021-10-12 NOTE — TELEPHONE ENCOUNTER
Spoke with patient's wife and the pharmacist was supposed to send over a refill for the patient's hiccup medication. Advised patient that I would get to the in basket but all refills take 24-48 hrs. Patient is unable to do virtual visits.   She verbalized understanding

## 2021-10-20 ENCOUNTER — OFFICE VISIT (OUTPATIENT)
Dept: FAMILY MEDICINE CLINIC | Age: 56
End: 2021-10-20
Payer: MEDICAID

## 2021-10-20 VITALS
RESPIRATION RATE: 24 BRPM | OXYGEN SATURATION: 99 % | HEIGHT: 72 IN | BODY MASS INDEX: 25.06 KG/M2 | DIASTOLIC BLOOD PRESSURE: 76 MMHG | HEART RATE: 61 BPM | TEMPERATURE: 99 F | SYSTOLIC BLOOD PRESSURE: 126 MMHG | WEIGHT: 185 LBS

## 2021-10-20 DIAGNOSIS — Z76.0 MEDICATION REFILL: ICD-10-CM

## 2021-10-20 DIAGNOSIS — Z13.228 SCREENING FOR ENDOCRINE, METABOLIC AND IMMUNITY DISORDER: ICD-10-CM

## 2021-10-20 DIAGNOSIS — K76.9 LIVER LESION, RIGHT LOBE: ICD-10-CM

## 2021-10-20 DIAGNOSIS — I10 BENIGN ESSENTIAL HYPERTENSION WITH TARGET BLOOD PRESSURE BELOW 140/90: ICD-10-CM

## 2021-10-20 DIAGNOSIS — Z13.0 SCREENING FOR ENDOCRINE, METABOLIC AND IMMUNITY DISORDER: ICD-10-CM

## 2021-10-20 DIAGNOSIS — Z13.29 SCREENING FOR ENDOCRINE, METABOLIC AND IMMUNITY DISORDER: ICD-10-CM

## 2021-10-20 DIAGNOSIS — K44.9 HIATAL HERNIA: ICD-10-CM

## 2021-10-20 DIAGNOSIS — R73.09 ELEVATED GLUCOSE: ICD-10-CM

## 2021-10-20 DIAGNOSIS — R06.6 HICCUPS: ICD-10-CM

## 2021-10-20 DIAGNOSIS — Z13.6 SCREENING FOR CARDIOVASCULAR CONDITION: ICD-10-CM

## 2021-10-20 PROCEDURE — 99214 OFFICE O/P EST MOD 30 MIN: CPT | Performed by: NURSE PRACTITIONER

## 2021-10-20 RX ORDER — FERROUS SULFATE TAB 325 MG (65 MG ELEMENTAL FE) 325 (65 FE) MG
TAB ORAL
Qty: 90 TABLET | Refills: 1 | Status: SHIPPED | OUTPATIENT
Start: 2021-10-20

## 2021-10-20 RX ORDER — BACLOFEN 5 MG/1
5 TABLET ORAL
Qty: 90 TABLET | Refills: 0 | Status: SHIPPED | OUTPATIENT
Start: 2021-10-20 | End: 2021-11-16

## 2021-10-20 RX ORDER — INDOMETHACIN 50 MG/1
CAPSULE ORAL
COMMUNITY
Start: 2021-09-16 | End: 2022-04-27

## 2021-10-20 RX ORDER — HYDROCHLOROTHIAZIDE 25 MG/1
25 TABLET ORAL DAILY
Qty: 90 TABLET | Refills: 1 | Status: SHIPPED | OUTPATIENT
Start: 2021-10-20 | End: 2022-04-27

## 2021-10-20 RX ORDER — FAMOTIDINE 20 MG/1
20 TABLET, FILM COATED ORAL EVERY 12 HOURS
COMMUNITY
Start: 2021-09-25 | End: 2022-01-11

## 2021-10-20 RX ORDER — AMLODIPINE BESYLATE 10 MG/1
10 TABLET ORAL DAILY
Qty: 90 TABLET | Refills: 1 | Status: SHIPPED | OUTPATIENT
Start: 2021-10-20 | End: 2022-04-27 | Stop reason: SDUPTHER

## 2021-10-20 NOTE — PROGRESS NOTES
OFFICE NOTE    Amrit Castelan is a 60 y.o. male presenting today for the following:  Chief Complaint   Patient presents with   [facility] Follow Up     Marisela Crease       HPI     Hypertension  Patient is currently on HCTZ and amlodipine. Patient denies chest pain, shortness of breath, vision changes or headaches. Patient take medication as prescribed and denies any side effects. We will continue on and regimen. Intractable Hiccups  Patient is being seen for a hospital follow on 9/27/2021 for intractable hiccups. Had a CT done that showed no acute process. Was started on Thorazine w/o relief and was given a trial of baclofen and gabapentin. He is no longer taking the gabapentin but is using the baclofen and doing well on this medication. He states when not taking the medication his hiccups will start back up. Patient have not followed up with a gastroenterology. During ER visit on the CT scan there also was a hiatal hernia noted. Will refer to gastroenterology at this time. Liver lesion/Hiatal hernia  On CT during ER patient had a indeterminate hypodense lesion right medial hepatic lobe and also a small hiatal hernia was found. According to radiologist a non emergent CT should be completed to be better characterized. Patient would rather wait for CT again in about 3 months. Declined following up with liver specialist at this time. Patient denies any abnormalities or stomach pain at this time. Impression from CT scan 9/27/21:  Indeterminate hypodense medial left hepatic lobe lesion. This could be better characterized with a nonemergent liver protocol abdominal MRI. Small hiatal hernia. No focal abnormality otherwise throughout the abdomen or pelvis. Ordered abdominal MRI for the patient to be completed. Lab order placed    Review of Systems   Constitutional: Negative for fatigue and fever. HENT: Negative. Eyes: Negative.     Respiratory: Negative for cough, chest tightness, shortness of breath and wheezing. Cardiovascular: Negative for chest pain and palpitations. Neurological: Negative for dizziness, light-headedness and headaches. History  Past Medical History:   Diagnosis Date    Arthritis     Chronic pain     joints due to Rheumatoid Arthritis    Contact dermatitis and other eczema, due to unspecified cause     dry skin in scalp    Depression     Gout     Headache(784.0)     Hiccups     Hypertension     Rheumatoid arthritis(714.0) 1983    Rheumatoid arthritis(714.0)     Seasonal allergic rhinitis        Past Surgical History:   Procedure Laterality Date    HX CYST REMOVAL  1983    large cyst removed on left shoulder       Social History     Socioeconomic History    Marital status:      Spouse name: Not on file    Number of children: Not on file    Years of education: Not on file    Highest education level: Not on file   Occupational History    Not on file   Tobacco Use    Smoking status: Current Every Day Smoker     Packs/day: 1.00     Years: 25.00     Pack years: 25.00     Types: Cigarettes    Smokeless tobacco: Never Used   Vaping Use    Vaping Use: Never assessed   Substance and Sexual Activity    Alcohol use: Yes     Comment: occasionally    Drug use: Not Currently     Types: OTC, Prescription    Sexual activity: Yes     Partners: Female   Other Topics Concern    Not on file   Social History Narrative    Not on file     Social Determinants of Health     Financial Resource Strain:     Difficulty of Paying Living Expenses:    Food Insecurity:     Worried About Running Out of Food in the Last Year:     Ran Out of Food in the Last Year:    Transportation Needs:     Lack of Transportation (Medical):      Lack of Transportation (Non-Medical):    Physical Activity:     Days of Exercise per Week:     Minutes of Exercise per Session:    Stress:     Feeling of Stress :    Social Connections:     Frequency of Communication with Friends and Family:  Frequency of Social Gatherings with Friends and Family:     Attends [employer] Services:     Active Member of Clubs or Organizations:     Attends Club or Organization Meetings:     Marital Status:    Intimate Partner Violence:     Fear of Current or Ex-Partner:     Emotionally Abused:     Physically Abused:     Sexually Abused: Allergies   Allergen Reactions    Lisinopril Anaphylaxis     Patient experienced throat swelling and respiratory distress as a result. Current Outpatient Medications   Medication Sig Dispense Refill    indomethacin (INDOCIN) 50 mg capsule       famotidine (PEPCID) 20 mg tablet Take 20 mg by mouth every twelve (12) hours.  amLODIPine (NORVASC) 10 mg tablet Take 1 Tablet by mouth daily. Indications: high blood pressure 90 Tablet 1    baclofen 5 mg tab Take 5 mg by mouth three (3) times daily as needed (Hiccups). 90 Tablet 0    hydroCHLOROthiazide (HYDRODIURIL) 25 mg tablet Take 1 Tablet by mouth daily. 90 Tablet 1    FeroSuL 325 mg (65 mg iron) tablet Take one tablet by mouth daily 90 Tablet 1    sildenafil citrate (VIAGRA) 100 mg tablet TAKE ONE TABLET BY MOUTH ONE HOUR PRIOR TO SEXUAL ACTIVITY-- MAXIMUM DAILY DOSE : 100MG. 30 Tablet 2    fluticasone propionate (FLONASE) 50 mcg/actuation nasal spray 2 Sprays by Both Nostrils route daily. 1 Bottle 5    allopurinoL (ZYLOPRIM) 300 mg tablet Take 1 Tablet by mouth daily. 90 Tablet 1    colchicine 0.6 mg tablet Take 1 Tablet by mouth daily. 90 Tablet 1    doxycycline (MONODOX) 100 mg capsule Take 100 mg by mouth two (2) times a day.       cetirizine (ZYRTEC) 10 mg tablet       metoclopramide HCl (REGLAN) 10 mg tablet  (Patient not taking: Reported on 10/20/2021)      methylPREDNISolone (MEDROL DOSEPACK) 4 mg tablet Per dose pack instructions (Patient not taking: Reported on 9/20/2021) 1 Dose Pack 0    ferrous sulfate 325 mg (65 mg iron) tablet  (Patient not taking: Reported on 10/20/2021)      doxycycline (VIBRAMYCIN) 100 mg capsule            Patient Care Team:  Patient Care Team:  Celia Figueroa NP as PCP - General (Nurse Practitioner)  Celia Figueroa NP as PCP - [address] [place] [address]  [facility] Provider  Brandie Lee MD as Physician (Physical Medicine and Rehabilitation)  Cm Archer MD as Physician (Rheumatology)      LABS:  No results found for any visits on 10/20/21. RADIOLOGY:  No recent results      Physical Exam  Vitals and nursing note reviewed. Constitutional:       Appearance: Normal appearance. He is well-developed. HENT:      Right Ear: External ear normal.      Left Ear: External ear normal.   Eyes:      Pupils: Pupils are equal, round, and reactive to light. Cardiovascular:      Rate and Rhythm: Normal rate and regular rhythm. Heart sounds: Normal heart sounds. Pulmonary:      Effort: Pulmonary effort is normal. No respiratory distress. Breath sounds: Normal breath sounds. No wheezing or rales. Abdominal:      General: Bowel sounds are normal. There is no distension. Palpations: Abdomen is soft. Tenderness: There is no abdominal tenderness. There is no rebound. Hernia: A hernia (upon assessment today) is present. Musculoskeletal:         General: Normal range of motion. Cervical back: Normal range of motion and neck supple. Lymphadenopathy:      Cervical: No cervical adenopathy. Skin:     General: Skin is warm and dry. Neurological:      Mental Status: He is alert and oriented to person, place, and time. Deep Tendon Reflexes: Reflexes are normal and symmetric. Psychiatric:         Mood and Affect: Mood normal.           Vitals:    10/20/21 1142   BP: 126/76   Pulse: 61   Resp: 24   Temp: 99 °F (37.2 °C)   TempSrc: Temporal   SpO2: 99%   Weight: 185 lb (83.9 kg)   Height: 6' (1.829 m)   PainSc:   5   PainLoc: Foot         Assessment and Plan    1.  Benign essential hypertension with target blood pressure below 140/90    - amLODIPine (NORVASC) 10 mg tablet; Take 1 Tablet by mouth daily. Indications: high blood pressure  Dispense: 90 Tablet; Refill: 1  - hydroCHLOROthiazide (HYDRODIURIL) 25 mg tablet; Take 1 Tablet by mouth daily. Dispense: 90 Tablet; Refill: 1  - CBC W/O DIFF; Future    2. Hiccups    - baclofen 5 mg tab; Take 5 mg by mouth three (3) times daily as needed (Hiccups). Dispense: 90 Tablet; Refill: 0  - REFERRAL TO GASTROENTEROLOGY    3. Medication refill    - amLODIPine (NORVASC) 10 mg tablet; Take 1 Tablet by mouth daily. Indications: high blood pressure  Dispense: 90 Tablet; Refill: 1  - baclofen 5 mg tab; Take 5 mg by mouth three (3) times daily as needed (Hiccups). Dispense: 90 Tablet; Refill: 0  - hydroCHLOROthiazide (HYDRODIURIL) 25 mg tablet; Take 1 Tablet by mouth daily. Dispense: 90 Tablet; Refill: 1  - FeroSuL 325 mg (65 mg iron) tablet; Take one tablet by mouth daily  Dispense: 90 Tablet; Refill: 1    4. Liver lesion, right lobe    - MRI ABD W CONT; Future    5. Elevated glucose    - HEMOGLOBIN A1C WITH EAG; Future    6. Hiatal hernia      7. Screening for endocrine, metabolic and immunity disorder    - CBC W/O DIFF; Future  - METABOLIC PANEL, COMPREHENSIVE; Future  - LIPID PANEL; Future  - HEMOGLOBIN A1C WITH EAG; Future    8. Screening for cardiovascular condition    - LIPID PANEL; Future      MDM    Procedures      *Plan of care reviewed with patient. Patient in agreement with plan and expresses understanding. All questions answered and patient encouraged to call or RTO if further questions or concerns. Advised patient if symptoms worsen to go to nearest ER or call 911. AVS and recommendations given to patient upon discharge.

## 2021-10-20 NOTE — PROGRESS NOTES
.1. Have you been to the ER, urgent care clinic since your last visit? Hospitalized since your last visit? Yes When: [facility]    2. Have you seen or consulted any other health care providers outside of the [address] [place] [address] since your last visit? Include any pap smears or colon screening.  No

## 2021-11-04 DIAGNOSIS — G89.29 CHRONIC PAIN OF RIGHT ANKLE: ICD-10-CM

## 2021-11-04 DIAGNOSIS — M25.532 PAIN IN BOTH WRISTS: ICD-10-CM

## 2021-11-04 DIAGNOSIS — M25.531 PAIN IN BOTH WRISTS: ICD-10-CM

## 2021-11-04 DIAGNOSIS — M25.571 CHRONIC PAIN OF RIGHT ANKLE: ICD-10-CM

## 2021-11-07 RX ORDER — CELECOXIB 200 MG/1
CAPSULE ORAL
Qty: 30 CAPSULE | Refills: 2 | Status: SHIPPED | OUTPATIENT
Start: 2021-11-07 | End: 2022-04-27

## 2021-11-16 DIAGNOSIS — R06.6 HICCUPS: ICD-10-CM

## 2021-11-16 DIAGNOSIS — Z76.0 MEDICATION REFILL: ICD-10-CM

## 2021-11-16 RX ORDER — BACLOFEN 5 MG/1
TABLET ORAL
Qty: 90 TABLET | Refills: 0 | Status: SHIPPED | OUTPATIENT
Start: 2021-11-16 | End: 2021-12-14

## 2021-11-17 NOTE — PATIENT INSTRUCTIONS
Hiatal Hernia: Care Instructions  Your Care Instructions  A hiatal hernia occurs when part of the stomach bulges into the chest cavity. A hiatal hernia may allow stomach acid and juices to back up into the esophagus (acid reflux). This can cause a feeling of burning, warmth, heat, or pain behind the breastbone. This feeling may often occur after you eat, soon after you lie down, or when you bend forward, and it may come and go. You also may have a sour taste in your mouth. These symptoms are commonly known as heartburn or reflux. But not all hiatal hernias cause symptoms. Follow-up care is a key part of your treatment and safety. Be sure to make and go to all appointments, and call your doctor if you are having problems. It's also a good idea to know your test results and keep a list of the medicines you take. How can you care for yourself at home? · Take your medicines exactly as prescribed. Call your doctor if you think you are having a problem with your medicine. · Do not take aspirin or other nonsteroidal anti-inflammatory drugs (NSAIDs), such as ibuprofen (Advil, Motrin) or naproxen (Aleve), unless your doctor says it is okay. Ask your doctor what you can take for pain. · Your doctor may recommend over-the-counter medicine. For mild or occasional indigestion, antacids such as Tums, Gaviscon, Maalox, or Mylanta may help. Your doctor also may recommend over-the-counter acid reducers, such as famotidine (Pepcid AC), cimetidine (Tagamet HB), or omeprazole (Prilosec). Read and follow all instructions on the label. If you use these medicines often, talk with your doctor. · Change your eating habits. ? It's best to eat several small meals instead of two or three large meals. ? After you eat, wait 2 to 3 hours before you lie down. Late-night snacks aren't a good idea. ? Chocolate, mint, and alcohol can make heartburn worse. They relax the valve between the esophagus and the stomach. ?  Spicy foods, foods that have a lot of acid (like tomatoes and oranges), and coffee can make heartburn symptoms worse in some people. If your symptoms are worse after you eat a certain food, you may want to stop eating that food to see if your symptoms get better. · Do not smoke or chew tobacco.  · If you get heartburn at night, raise the head of your bed 6 to 8 inches by putting the frame on blocks or placing a foam wedge under the head of your mattress. (Adding extra pillows does not work.)  · Do not wear tight clothing around your middle. · Lose weight if you need to. Losing just 5 to 10 pounds can help. When should you call for help? Call your doctor now or seek immediate medical care if:    · You have new or worse belly pain.     · You are vomiting. Watch closely for changes in your health, and be sure to contact your doctor if:    · You have new or worse symptoms of indigestion.     · You have trouble or pain swallowing.     · You are losing weight.     · You do not get better as expected. Where can you learn more? Go to http://www.[employer].com/  Enter T074 in the search box to learn more about \"Hiatal Hernia: Care Instructions. \"  Current as of: February 10, 2021               Content Version: 13.0  © 2006-2021 [employer]. Care instructions adapted under license by [employer] (which disclaims liability or warranty for this information). If you have questions about a medical condition or this instruction, always ask your healthcare professional. [place] [address] any warranty or liability for your use of this information. Graft Donor Site Bandage (Optional-Leave Blank If You Don't Want In Note): Steri-strips and a pressure bandage were applied to the donor site.

## 2021-11-19 ENCOUNTER — OFFICE VISIT (OUTPATIENT)
Dept: ORTHOPEDIC SURGERY | Age: 56
End: 2021-11-19
Payer: MEDICAID

## 2021-11-19 VITALS — BODY MASS INDEX: 25.09 KG/M2 | TEMPERATURE: 97.2 F | WEIGHT: 185 LBS

## 2021-11-19 DIAGNOSIS — M10.9 ACUTE GOUT OF MULTIPLE SITES, UNSPECIFIED CAUSE: Primary | ICD-10-CM

## 2021-11-19 PROCEDURE — 99213 OFFICE O/P EST LOW 20 MIN: CPT | Performed by: ORTHOPAEDIC SURGERY

## 2021-11-19 NOTE — PROGRESS NOTES
Edelmira Garza is a 60 y.o. male right handed unspecified employment. Worker's Compensation and legal considerations: none filed. Vitals:    11/19/21 0900   Temp: 97.2 °F (36.2 °C)   Weight: 185 lb (83.9 kg)   PainSc:   0 - No pain           Chief Complaint   Patient presents with    Foot Pain     bilat       HPI: Patient returns today for follow-up after seeing a rheumatologist.  The rheumatologist put him on gout for his feet and reported that there is significant improvement in his hands over symptoms of being on that medication. Ischial HPI: Patient presents today with complaints of chronic bilateral wrist pain for many decades. He has an appointment with a rheumatologist coming up.     Date of onset: Chronic    Injury: No    Prior Treatment:  No    Numbness/ Tingling: No      ROS: Review of Systems - General ROS: negative  Psychological ROS: negative  ENT ROS: negative  Allergy and Immunology ROS: negative  Hematological and Lymphatic ROS: negative  Respiratory ROS: no cough, shortness of breath, or wheezing  Cardiovascular ROS: no chest pain or dyspnea on exertion  Gastrointestinal ROS: no abdominal pain, change in bowel habits, or black or bloody stools  Musculoskeletal ROS: positive for - pain in wrist - bilateral and swelling in wrist - bilateral  Neurological ROS: negative  Dermatological ROS: negative    Past Medical History:   Diagnosis Date    Arthritis     Chronic pain     joints due to Rheumatoid Arthritis    Contact dermatitis and other eczema, due to unspecified cause     dry skin in scalp    Depression     Gout     Headache(784.0)     Hiccups     Hypertension     Rheumatoid arthritis(714.0) 1983    Rheumatoid arthritis(714.0)     Seasonal allergic rhinitis        Past Surgical History:   Procedure Laterality Date    HX CYST REMOVAL  1983    large cyst removed on left shoulder       Current Outpatient Medications   Medication Sig Dispense Refill    baclofen 5 mg tab TAKE ONE TABLET BY MOUTH THREE TIMES A DAY AS NEEDED FOR HICCUPS 90 Tablet 0    celecoxib (CELEBREX) 200 mg capsule TAKE ONE CAPSULE BY MOUTH DAILY 30 Capsule 2    indomethacin (INDOCIN) 50 mg capsule       famotidine (PEPCID) 20 mg tablet Take 20 mg by mouth every twelve (12) hours.  amLODIPine (NORVASC) 10 mg tablet Take 1 Tablet by mouth daily. Indications: high blood pressure 90 Tablet 1    hydroCHLOROthiazide (HYDRODIURIL) 25 mg tablet Take 1 Tablet by mouth daily. 90 Tablet 1    FeroSuL 325 mg (65 mg iron) tablet Take one tablet by mouth daily 90 Tablet 1    sildenafil citrate (VIAGRA) 100 mg tablet TAKE ONE TABLET BY MOUTH ONE HOUR PRIOR TO SEXUAL ACTIVITY-- MAXIMUM DAILY DOSE : 100MG. 30 Tablet 2    fluticasone propionate (FLONASE) 50 mcg/actuation nasal spray 2 Sprays by Both Nostrils route daily. 1 Bottle 5    allopurinoL (ZYLOPRIM) 300 mg tablet Take 1 Tablet by mouth daily. 90 Tablet 1    colchicine 0.6 mg tablet Take 1 Tablet by mouth daily. 90 Tablet 1    doxycycline (VIBRAMYCIN) 100 mg capsule       doxycycline (MONODOX) 100 mg capsule Take 100 mg by mouth two (2) times a day.  cetirizine (ZYRTEC) 10 mg tablet       metoclopramide HCl (REGLAN) 10 mg tablet  (Patient not taking: Reported on 10/20/2021)      methylPREDNISolone (MEDROL DOSEPACK) 4 mg tablet Per dose pack instructions (Patient not taking: Reported on 9/20/2021) 1 Dose Pack 0    ferrous sulfate 325 mg (65 mg iron) tablet  (Patient not taking: Reported on 10/20/2021)         Allergies   Allergen Reactions    Lisinopril Anaphylaxis     Patient experienced throat swelling and respiratory distress as a result. PE:     Physical Exam  Vitals and nursing note reviewed. Constitutional:       General: He is not in acute distress. Appearance: Normal appearance. He is not ill-appearing. Cardiovascular:      Pulses: Normal pulses. Pulmonary:      Effort: Pulmonary effort is normal. No respiratory distress. Musculoskeletal:         General: No swelling, tenderness, deformity or signs of injury. Normal range of motion. Cervical back: Normal range of motion and neck supple. Right lower leg: No edema. Left lower leg: No edema. Skin:     General: Skin is warm and dry. Capillary Refill: Capillary refill takes less than 2 seconds. Findings: No bruising or erythema. Neurological:      General: No focal deficit present. Mental Status: He is alert and oriented to person, place, and time. Psychiatric:         Mood and Affect: Mood normal.         Behavior: Behavior normal.            Bilateral wrist: Edema has resolved in the wrist.  Range of motion is full with minimal pain. Neurovascularly intact distally. [id number] 3 views of bilateral wrists is significant for severe erosive arthritis with near autofusion of most of the joints of the wrist.        ICD-10-CM ICD-9-CM    1. Acute gout of multiple sites, unspecified cause  M10.9 274.01          Plan: At this point as the patient is doing much better on his gout medication, we will follow up on an as-needed basis. Follow-up and Dispositions    · Return if symptoms worsen or fail to improve.           Plan was reviewed with patient, who verbalized agreement and understanding of the plan

## 2021-12-12 RX ORDER — FAMOTIDINE 40 MG/1
TABLET, FILM COATED ORAL
Qty: 30 TABLET | Refills: 0 | Status: SHIPPED | OUTPATIENT
Start: 2021-12-12 | End: 2022-01-11

## 2021-12-13 DIAGNOSIS — Z76.0 MEDICATION REFILL: ICD-10-CM

## 2021-12-13 DIAGNOSIS — R06.6 HICCUPS: ICD-10-CM

## 2021-12-14 RX ORDER — BACLOFEN 5 MG/1
TABLET ORAL
Qty: 90 TABLET | Refills: 0 | Status: SHIPPED | OUTPATIENT
Start: 2021-12-14 | End: 2022-01-11

## 2021-12-20 ENCOUNTER — OFFICE VISIT (OUTPATIENT)
Dept: ORTHOPEDIC SURGERY | Age: 56
End: 2021-12-20
Payer: MEDICAID

## 2021-12-20 VITALS
HEIGHT: 72 IN | HEART RATE: 78 BPM | SYSTOLIC BLOOD PRESSURE: 136 MMHG | TEMPERATURE: 97.1 F | WEIGHT: 186 LBS | DIASTOLIC BLOOD PRESSURE: 84 MMHG | BODY MASS INDEX: 25.19 KG/M2

## 2021-12-20 DIAGNOSIS — M10.9 ACUTE GOUT OF MULTIPLE SITES, UNSPECIFIED CAUSE: Primary | ICD-10-CM

## 2021-12-20 DIAGNOSIS — M19.90 INFLAMMATORY ARTHRITIS: ICD-10-CM

## 2021-12-20 PROCEDURE — 99213 OFFICE O/P EST LOW 20 MIN: CPT | Performed by: ORTHOPAEDIC SURGERY

## 2021-12-20 RX ORDER — TRAMADOL HYDROCHLORIDE 50 MG/1
50 TABLET ORAL
Qty: 7 TABLET | Refills: 0 | Status: SHIPPED | OUTPATIENT
Start: 2021-12-20 | End: 2021-12-27

## 2021-12-20 NOTE — PROGRESS NOTES
AMBULATORY PROGRESS NOTE      Patient: Bouchra Barrow             MRN: [id number]     SSN: [id number] Body mass index is 25.23 kg/m². YOB: 1965     AGE: 60 y.o. EX: male    PCP: Bill Hannah NP       IMPRESSION //  DIAGNOSIS AND TREATMENT PLAN        Bouchra Barrow has a diagnosis of:      DIAGNOSES    1. Acute gout of multiple sites, unspecified cause    2. Inflammatory arthritis        Orders Placed This Encounter    traMADoL (ULTRAM) 50 mg tablet     Sig: Take 1 Tablet by mouth nightly as needed for Pain for up to 7 days. Max Daily Amount: 50 mg. Dispense:  7 Tablet     Refill:  0            PLAN:    1. I advise the patient to call his rheumatologist regarding the amount of medication he is taking. 2. AVS: Jarett Salgado, Dr. Nancy Walters, and [employer] ([employer] or [employer])  3. Renew Rx of  Tramadol      RTO: 6 weeks    Patient Polo Medina, Dr. Nancy Walters, and [employer] ([employer] or [employer])        Please follow up with your PCP for any health maintenance as recommended         Bouchra Barrow  expresses understanding of the diagnosis, treatment plan, and all of their proposed questions were answered to their satisfaction. Patient education has been provided re the diagnoses. HPI //  [facility] Jovani Churchill IS A 60 y.o. male who is a/an  established patient, presenting to my outpatient office for evaluation of  the following chief complaint(s):     Chief Complaint   Patient presents with    Ankle Pain     right    Foot Pain     right       At [facility] 59 presented w/ inflammatory arthritis of right foot. He plans on seeing Easton Alicia on 10/6/2021. Wean off R short CAM walker boot. AVS: Dr.Comfort Maggie, [employer]oes : at [employer] ,[employer] at [employer]. Renew of Tramadol. DME: R Hard sole shoe.     Since LOV Prashanth Babb continues to endorse right dorsal midfoot pain.  He notes having prolonged standing causes right ankle/foot pain and swelling . He shows me the medicine he is currently taking , which includes the following:  Allopurinol, BP medication, Iron supplementation, colchicine,, Indomethacin, and Pepcid. Visit Vitals  /84   Pulse 78   Temp 97.1 °F (36.2 °C)   Ht 6' (1.829 m)   Wt 186 lb (84.4 kg)   BMI 25.23 kg/m²       Appearance: Alert, well appearing and pleasant patient who is in no distress, oriented to person, place/time, and who follows commands. Normal dress/motor activity/thought processes/memory. This patient is accompanied in the examination room by his  self. Patient arrives to office via: with assistive device: hard sole shoe    Psychiatric:  Normal Affect/mood. Judgement, behavior, and conduct are appropriate. Speech normal in context and clarity, memory intact grossly, no involuntary movements - tremors. H EENT (2): Head normocephalic & atraumatic. Eye: pupils are round// EOM are intact // Neck: ROM WNL  // Hearings Intact   Respiratory: Breathing non labored     ANKLE/FOOT left    Gait: uses assistive device hard sole shoe  Tenderness: mild over  dorsal midfoot  Cutaneous: less swelling tot he ankle  Joint Motion:   WNL   Joint / Tendon Stability:  No Ankle or Subtalar instability or joint laxity. No peroneal sublux ability or dislocation  Alignment: neutral Hindfoot,    Neuro Motor/Sensory: NL/NL  Vascular: NL foot/ankle pulses,   Lymphatics: No extremity lymphedema, No calf swelling, no tenderness to calf muscles. CHART REVIEW     Sina Ritter has been experiencing pain and discomfort confirmed as outlined in the pain assessment outlined below.  was reviewed by Namrata Brito MD on 12/20/2021. Pain Assessment  12/20/2021   Location of Pain Foot; Ankle   Pain Location Comment -   Location Modifiers Right   Severity of Pain 6   Quality of Pain Throbbing;Aching   Quality of Pain Comment -   Duration of Pain A few minutes   Frequency of Pain Several times daily   Date Pain First Started -   Date Pain First Started Comment -   Aggravating Factors Standing;Walking   Aggravating Factors Comment -   Limiting Behavior Some   Relieving Factors Rest;Elevation   Relieving Factors Comment -   Result of Injury No        Prashanth Babb  has a past medical history of Arthritis, Chronic pain, Contact dermatitis and other eczema, due to unspecified cause, Depression, Gout, Headache(784.0), Hiccups, Hypertension, Rheumatoid arthritis(714.0) (1983), Rheumatoid arthritis(714.0), and Seasonal allergic rhinitis. He has no past medical history of Abuse, Anemia NEC, Arrhythmia, Asthma, CAD (coronary artery disease), Calculus of kidney, Cancer ([facility] [address] Chronic kidney disease, Congestive heart failure, unspecified, COPD, Diabetes ([address] GERD (gastroesophageal reflux disease), Hypercholesterolemia, Liver disease, Psychotic disorder ([facility] [address] PUD (peptic ulcer disease), Seizures ([facility] [address] Stroke ([facility] [address] Thromboembolus ([facility] [address] Thyroid disease, or Trauma. Patients is employed at:         Past Medical History:   Diagnosis Date    Arthritis     Chronic pain     joints due to Rheumatoid Arthritis    Contact dermatitis and other eczema, due to unspecified cause     dry skin in scalp    Depression     Gout     Headache(784.0)     Hiccups     Hypertension     Rheumatoid arthritis(714.0) 1983    Rheumatoid arthritis(714.0)     Seasonal allergic rhinitis      Past Surgical History:   Procedure Laterality Date    HX CYST REMOVAL  1983    large cyst removed on left shoulder     Current Outpatient Medications   Medication Sig    traMADoL (ULTRAM) 50 mg tablet Take 1 Tablet by mouth nightly as needed for Pain for up to 7 days.  Max Daily Amount: 50 mg.    baclofen 5 mg tab TAKE ONE TABLET BY MOUTH THREE TIMES A DAY AS NEEDED FOR HICCUPS    famotidine (PEPCID) 40 mg tablet TAKE ONE TABLET BY MOUTH DAILY    sildenafil citrate (VIAGRA) 100 mg tablet TAKE ONE TABLET BY MOUTH ONE HOUR PRIOR TO SEXUAL ACTIVITY   ( MAXIMUM DAILY DOSE 100 MG)    celecoxib (CELEBREX) 200 mg capsule TAKE ONE CAPSULE BY MOUTH DAILY    indomethacin (INDOCIN) 50 mg capsule     famotidine (PEPCID) 20 mg tablet Take 20 mg by mouth every twelve (12) hours.  amLODIPine (NORVASC) 10 mg tablet Take 1 Tablet by mouth daily. Indications: high blood pressure    hydroCHLOROthiazide (HYDRODIURIL) 25 mg tablet Take 1 Tablet by mouth daily.  FeroSuL 325 mg (65 mg iron) tablet Take one tablet by mouth daily    metoclopramide HCl (REGLAN) 10 mg tablet  (Patient not taking: Reported on 10/20/2021)    methylPREDNISolone (MEDROL DOSEPACK) 4 mg tablet Per dose pack instructions (Patient not taking: Reported on 9/20/2021)    fluticasone propionate (FLONASE) 50 mcg/actuation nasal spray 2 Sprays by Both Nostrils route daily.  allopurinoL (ZYLOPRIM) 300 mg tablet Take 1 Tablet by mouth daily.  colchicine 0.6 mg tablet Take 1 Tablet by mouth daily.  ferrous sulfate 325 mg (65 mg iron) tablet  (Patient not taking: Reported on 10/20/2021)    doxycycline (VIBRAMYCIN) 100 mg capsule     doxycycline (MONODOX) 100 mg capsule Take 100 mg by mouth two (2) times a day.  cetirizine (ZYRTEC) 10 mg tablet      No current facility-administered medications for this visit. Allergies   Allergen Reactions    Lisinopril Anaphylaxis     Patient experienced throat swelling and respiratory distress as a result.        Social History     Occupational History    Not on file   Tobacco Use    Smoking status: Current Every Day Smoker     Packs/day: 1.00     Years: 25.00     Pack years: 25.00     Types: Cigarettes    Smokeless tobacco: Never Used   Vaping Use    Vaping Use: Not on file   Substance and Sexual Activity    Alcohol use: Yes     Comment: occasionally    Drug use: Not Currently     Types: OTC, Prescription    Sexual activity: Yes     Partners: Female     Family History   Problem Relation Age of Onset    Emphysema Mother    Huy Armstrong Arthritis-rheumatoid Mother     Emphysema Father     Arthritis-rheumatoid Father     Cancer Sister 35        breast    Liver Disease Brother     Thyroid Disease Brother         DIAGNOSTIC LAB DATA      Lab Results   Component Value Date/Time    Hemoglobin A1c 4.6 (L) 04/28/2021 10:03 AM    Hemoglobin A1c 4.6 (L) 09/28/2017 10:16 AM    Hemoglobin A1c 4.8 11/16/2016 10:47 AM    Hemoglobin A1c, External 4.6 04/29/2021 12:00 AM    //   Lab Results   Component Value Date/Time    Glucose 91 08/09/2021 08:59 AM        Lab Results   Component Value Date/Time    Hemoglobin A1c, External 4.6 04/29/2021 12:00 AM         Lab Results   Component Value Date/Time    VITAMIN D, 25-HYDROXY 29.8 (L) 09/28/2017 10:16 AM        Drug Screen Most Recent Result Date    No resulted procedures found. REVIEW OF SYSTEMS : 12/20/2021  ALL BELOW ARE Negative except : SEE HPI     All other systems reviewed and are negative. 12 point review of systems otherwise negative unless noted in HPI. RADIOGRAPHS// IMAGING//DIAGNOSTIC DATA     Orders Placed This Encounter    traMADoL (ULTRAM) 50 mg tablet         I have personally reviewed the images of the above study. The interpretation of this study is my professional opinion            I have spent 30 minutes reviewing the previous notes, reviewing diagnostic studies [Advanced  Imaging, Diagnostic test results (x-rays)] and had a direct face to face with the patient discussing the diagnosis and importance of compliance with the treatment and plan. There is  discussion for the potential for surgery, answering all questions, as well as documenting patient care coordination for this individual on the day of the visit. Disclaimer:     Sections of this note are dictated using utilizing voice recognition software, which may have resulted in some phonetic based errors in grammar and contents.  Even though attempts were made to correct all the mistakes, some may have been missed, and remained in the body of the document. If questions arise, please contact our department. An electronic signature was used to authenticate this note. Rubens Freitas may have a reminder for a \"due or due soon\" health maintenance. I have asked that he contact his primary care provider for follow-up on this health maintenance. Patient Nessa Torres, Dr. Liam Beasley, and Deep cifuentes ([facility] or [address] [facility] [address])            Please follow up with your PCP for any health maintenance as recommended. Avis Serra,as dictated byDavid.   12/20/2021  7:55 AM

## 2022-01-11 DIAGNOSIS — R06.6 HICCUPS: ICD-10-CM

## 2022-01-11 DIAGNOSIS — Z76.0 MEDICATION REFILL: ICD-10-CM

## 2022-01-11 RX ORDER — FAMOTIDINE 40 MG/1
TABLET, FILM COATED ORAL
Qty: 30 TABLET | Refills: 0 | Status: SHIPPED | OUTPATIENT
Start: 2022-01-11 | End: 2022-04-27 | Stop reason: SDUPTHER

## 2022-01-11 RX ORDER — BACLOFEN 5 MG/1
TABLET ORAL
Qty: 90 TABLET | Refills: 0 | Status: SHIPPED | OUTPATIENT
Start: 2022-01-11

## 2022-01-20 LAB
ALBUMIN SERPL-MCNC: 4.4 G/DL (ref 3.8–4.9)
ALBUMIN/GLOB SERPL: 1.7 {RATIO} (ref 1.2–2.2)
ALP SERPL-CCNC: 75 IU/L (ref 44–121)
ALT SERPL-CCNC: 15 IU/L (ref 0–44)
AST SERPL-CCNC: 27 IU/L (ref 0–40)
BILIRUB SERPL-MCNC: 0.5 MG/DL (ref 0–1.2)
BUN SERPL-MCNC: 9 MG/DL (ref 6–24)
BUN/CREAT SERPL: 11 (ref 9–20)
CALCIUM SERPL-MCNC: 10 MG/DL (ref 8.7–10.2)
CHLORIDE SERPL-SCNC: 101 MMOL/L (ref 96–106)
CHOLEST SERPL-MCNC: 142 MG/DL (ref 100–199)
CO2 SERPL-SCNC: 27 MMOL/L (ref 20–29)
CREAT SERPL-MCNC: 0.82 MG/DL (ref 0.76–1.27)
ERYTHROCYTE [DISTWIDTH] IN BLOOD BY AUTOMATED COUNT: 15.6 % (ref 11.6–15.4)
EST. AVERAGE GLUCOSE BLD GHB EST-MCNC: 80 MG/DL
GLOBULIN SER CALC-MCNC: 2.6 G/DL (ref 1.5–4.5)
GLUCOSE SERPL-MCNC: 93 MG/DL (ref 65–99)
HBA1C MFR BLD: 4.4 % (ref 4.8–5.6)
HCT VFR BLD AUTO: 37.1 % (ref 37.5–51)
HDLC SERPL-MCNC: 76 MG/DL
HGB BLD-MCNC: 13 G/DL (ref 13–17.7)
IMP & REVIEW OF LAB RESULTS: NORMAL
LDLC SERPL CALC-MCNC: 52 MG/DL (ref 0–99)
MCH RBC QN AUTO: 29.3 PG (ref 26.6–33)
MCHC RBC AUTO-ENTMCNC: 35 G/DL (ref 31.5–35.7)
MCV RBC AUTO: 84 FL (ref 79–97)
PLATELET # BLD AUTO: 192 X10E3/UL (ref 150–450)
POTASSIUM SERPL-SCNC: 4.1 MMOL/L (ref 3.5–5.2)
PROT SERPL-MCNC: 7 G/DL (ref 6–8.5)
RBC # BLD AUTO: 4.44 X10E6/UL (ref 4.14–5.8)
SODIUM SERPL-SCNC: 138 MMOL/L (ref 134–144)
TRIGL SERPL-MCNC: 70 MG/DL (ref 0–149)
VLDLC SERPL CALC-MCNC: 14 MG/DL (ref 5–40)
WBC # BLD AUTO: 6.6 X10E3/UL (ref 3.4–10.8)

## 2022-01-27 ENCOUNTER — OFFICE VISIT (OUTPATIENT)
Dept: FAMILY MEDICINE CLINIC | Age: 57
End: 2022-01-27
Payer: MEDICAID

## 2022-01-27 VITALS
WEIGHT: 192 LBS | SYSTOLIC BLOOD PRESSURE: 130 MMHG | OXYGEN SATURATION: 99 % | TEMPERATURE: 98.3 F | HEART RATE: 67 BPM | RESPIRATION RATE: 20 BRPM | BODY MASS INDEX: 26.01 KG/M2 | DIASTOLIC BLOOD PRESSURE: 76 MMHG | HEIGHT: 72 IN

## 2022-01-27 DIAGNOSIS — M79.671 RIGHT FOOT PAIN: ICD-10-CM

## 2022-01-27 DIAGNOSIS — R06.6 HICCUPS: ICD-10-CM

## 2022-01-27 DIAGNOSIS — Z76.0 MEDICATION REFILL: ICD-10-CM

## 2022-01-27 DIAGNOSIS — K44.9 HIATAL HERNIA: ICD-10-CM

## 2022-01-27 DIAGNOSIS — M25.571 RIGHT ANKLE PAIN, UNSPECIFIED CHRONICITY: ICD-10-CM

## 2022-01-27 DIAGNOSIS — M06.9 RHEUMATOID ARTHRITIS INVOLVING MULTIPLE SITES, UNSPECIFIED WHETHER RHEUMATOID FACTOR PRESENT (HCC): ICD-10-CM

## 2022-01-27 DIAGNOSIS — J30.1 SEASONAL ALLERGIC RHINITIS DUE TO POLLEN: ICD-10-CM

## 2022-01-27 DIAGNOSIS — M10.00 IDIOPATHIC GOUT, UNSPECIFIED CHRONICITY, UNSPECIFIED SITE: ICD-10-CM

## 2022-01-27 DIAGNOSIS — K76.9 LIVER LESION, RIGHT LOBE: ICD-10-CM

## 2022-01-27 DIAGNOSIS — I10 ESSENTIAL HYPERTENSION: ICD-10-CM

## 2022-01-27 DIAGNOSIS — M50.30 DDD (DEGENERATIVE DISC DISEASE), CERVICAL: ICD-10-CM

## 2022-01-27 PROCEDURE — 99214 OFFICE O/P EST MOD 30 MIN: CPT | Performed by: NURSE PRACTITIONER

## 2022-01-27 RX ORDER — FLUTICASONE PROPIONATE 50 MCG
2 SPRAY, SUSPENSION (ML) NASAL DAILY
Qty: 1 EACH | Refills: 2 | Status: SHIPPED | OUTPATIENT
Start: 2022-01-27 | End: 2022-04-27 | Stop reason: SDUPTHER

## 2022-01-27 RX ORDER — FAMOTIDINE 40 MG/1
40 TABLET, FILM COATED ORAL DAILY
Qty: 30 TABLET | Refills: 0 | Status: CANCELLED | OUTPATIENT
Start: 2022-01-27

## 2022-01-27 RX ORDER — LANOLIN ALCOHOL/MO/W.PET/CERES
CREAM (GRAM) TOPICAL
Qty: 90 TABLET | Refills: 0 | Status: SHIPPED | OUTPATIENT
Start: 2022-01-27 | End: 2022-04-27 | Stop reason: SDUPTHER

## 2022-01-27 RX ORDER — CETIRIZINE HCL 10 MG
10 TABLET ORAL DAILY
Qty: 90 TABLET | Refills: 0 | Status: SHIPPED | OUTPATIENT
Start: 2022-01-27 | End: 2022-04-27

## 2022-01-27 RX ORDER — ALLOPURINOL 300 MG/1
300 TABLET ORAL DAILY
Qty: 90 TABLET | Refills: 0 | Status: SHIPPED | OUTPATIENT
Start: 2022-01-27 | End: 2022-04-27 | Stop reason: SDUPTHER

## 2022-01-27 NOTE — PATIENT INSTRUCTIONS
Allergies: Care Instructions  Overview     Allergies occur when your body's defense system (immune system) overreacts to certain substances. The immune system treats a harmless substance as if it were a harmful germ or virus. Many things can make this happen. These include pollens, medicine, food, dust, animal dander, and mold. Allergies can be mild or severe. Mild allergies can be managed with home treatment. But medicine may be needed to prevent problems. Managing your allergies is an important part of staying healthy. Your doctor may suggest that you have allergy testing to help find out what is causing your allergies. Severe allergies can cause reactions that affect your whole body (anaphylactic reactions). Your doctor may prescribe a shot of epinephrine to carry with you in case you have a severe reaction. Learn how to give yourself the shot and keep it with you at all times. Make sure it is not . Follow-up care is a key part of your treatment and safety. Be sure to make and go to all appointments, and call your doctor if you are having problems. It's also a good idea to know your test results and keep a list of the medicines you take. How can you care for yourself at home? · If you have been told by your doctor that dust or dust mites are causing your allergy, decrease the dust around your bed:  ? Wash sheets, pillowcases, and other bedding in hot water every week. ? Use dust-proof covers for pillows, duvets, and mattresses. Avoid plastic covers because they tear easily and do not \"breathe. \" Wash as instructed on the label. ? Do not use any blankets and pillows that you do not need. ? Use blankets that you can wash in your washing machine. ? Consider removing drapes and carpets, which attract and hold dust, from your bedroom. · If you are allergic to house dust and mites, do not use home humidifiers. Your doctor can suggest ways you can control dust and mites.   · Look for signs of cockroaches. Cockroaches cause allergic reactions. Use cockroach baits to get rid of them. Then, clean your home well. Cockroaches like areas where grocery bags, newspapers, empty bottles, or cardboard boxes are stored. Do not keep these inside your home, and keep trash and food containers sealed. Seal off any spots where cockroaches might enter your home. · If you are allergic to mold, get rid of furniture, rugs, and drapes that smell musty. Check for mold in the bathroom. · If you are allergic to outdoor pollen or mold spores, use air-conditioning. Change or clean all filters every month. Keep windows closed. · If you are allergic to pollen, stay inside when pollen counts are high. Use a vacuum  with a HEPA filter or a double-thickness filter at least two times each week. · Stay inside when air pollution is bad. Avoid paint fumes, perfumes, and other strong odors. · Avoid conditions that make your allergies worse. Stay away from smoke. Do not smoke or let anyone else smoke in your house. Do not use fireplaces or wood-burning stoves. · If you are allergic to your pets, change the air filter in your furnace every month. Use high-efficiency filters. · If you are allergic to pet dander, keep pets outside or out of your bedroom. Old carpet and cloth furniture can hold a lot of animal dander. You may need to replace them. When should you call for help? Give an epinephrine shot if:    · You think you are having a severe allergic reaction.     · You have symptoms in more than one body area, such as mild nausea and an itchy mouth. After giving an epinephrine shot call 911, even if you feel better. Call 911 if:    · You have symptoms of a severe allergic reaction. These may include:  ? Sudden raised, red areas (hives) all over your body. ? Swelling of the throat, mouth, lips, or tongue. ? Trouble breathing. ? Passing out (losing consciousness).  Or you may feel very lightheaded or suddenly feel weak, confused, or restless.     · You have been given an epinephrine shot, even if you feel better. Call your doctor now or seek immediate medical care if:    · You have symptoms of an allergic reaction, such as:  ? A rash or hives (raised, red areas on the skin). ? Itching. ? Swelling. ? Belly pain, nausea, or vomiting. Watch closely for changes in your health, and be sure to contact your doctor if:    · You do not get better as expected. Where can you learn more? Go to http://www.fan.com/  Enter W171 in the search box to learn more about \"Allergies: Care Instructions. \"  Current as of: February 10, 2021               Content Version: 13.0  © 2006-2021 [employer]. Care instructions adapted under license by [employer] (which disclaims liability or warranty for this information). If you have questions about a medical condition or this instruction, always ask your healthcare professional. [place] [address] any warranty or liability for your use of this information.

## 2022-01-27 NOTE — PROGRESS NOTES
OFFICE NOTE    Crispin Moya is a 60 y.o. male presenting today for the following:  Chief Complaint   Patient presents with    Follow Up Chronic Condition    Results      HPI     Hypertension  Patient is currently on HCTZ and amlodipine. Patient denies chest pain, shortness of breath, vision changes or headaches. Patient take medication as prescribed and denies any side effects. We will continue on and regimen. Intractable Hiccups/Hiatal hernia/Liver lesion  Patient uses baclofen. Diagnosed with a hiatal hernia on a ER visit. Patient was referred to gastroenterology on last visit. He states he is scheduled to have a procedure in April 12, 2022. He is seeing [facility]. Liver lesion was noted on previous ER visit. Patient declined CT to be repeated on last visit and preferred to wait for a 3 month follow up to visualize the hernia. MRI was placed on last visit for the the liver lesion. Allergies  Patient uses zyrtec and flonase. Denies any side effect with this medication and take as prescribe. Will continue this regimen. Gout/Wrist pain/Foot and Ankle pain/DDD/Rheumatoid arthritis  Patient uses indomethacin and allopurinol that was ordered by rheumatology (Dr. Sweetie Heath). Rheumatologist referred patient to see Dr. Manoj Barrett for his wrist pain - patient was seen 11/19/2021. Patient is also seeing Dr. Pao Taylor in orthopedics for his Gout and inflammatory arthritis - patient was seen 12/20/21. Dr. Pao Taylor prescribed patient tramadol to take, advised patient to notify rheumatologist regarding his medications prescribed and also recommended him to get Dr. Genny Fraga, and Propet shoes from [facility] or [address]. Patient states he did not the shoes due to being to expensive. Patient today states he had labs completed rheumatologist and she stated to him that he did have a build up of uric acid around 2 months ago. Informed patient to have his office visit and labs forwarded to this office.      Labs reviewed today     Review of Systems   Constitutional: Negative for fatigue and fever. HENT: Negative. Eyes: Negative. Respiratory: Negative for cough, chest tightness, shortness of breath and wheezing. Cardiovascular: Negative for chest pain and palpitations. Neurological: Negative for dizziness, light-headedness and headaches. History  Past Medical History:   Diagnosis Date    Arthritis     Chronic pain     joints due to Rheumatoid Arthritis    Contact dermatitis and other eczema, due to unspecified cause     dry skin in scalp    Depression     Gout     Headache(784.0)     Hiccups     Hiccups     Hypertension     Rheumatoid arthritis(714.0) 1983    Rheumatoid arthritis(714.0)     Seasonal allergic rhinitis        Past Surgical History:   Procedure Laterality Date    HX CYST REMOVAL  1983    large cyst removed on left shoulder       Social History     Socioeconomic History    Marital status:      Spouse name: Not on file    Number of children: Not on file    Years of education: Not on file    Highest education level: Not on file   Occupational History    Not on file   Tobacco Use    Smoking status: Current Every Day Smoker     Packs/day: 1.00     Years: 25.00     Pack years: 25.00     Types: Cigarettes    Smokeless tobacco: Never Used   Vaping Use    Vaping Use: Not on file   Substance and Sexual Activity    Alcohol use: Yes     Comment: occasionally    Drug use: Not Currently     Types: OTC, Prescription    Sexual activity: Yes     Partners: Female   Other Topics Concern    Not on file   Social History Narrative    Not on file     Social Determinants of Health     Financial Resource Strain:     Difficulty of Paying Living Expenses: Not on file   Food Insecurity:     Worried About Running Out of Food in the Last Year: Not on file    Chris of Food in the Last Year: Not on file   Transportation Needs:     Lack of Transportation (Medical):  Not on file    Lack of Transportation (Non-Medical): Not on file   Physical Activity:     Days of Exercise per Week: Not on file    Minutes of Exercise per Session: Not on file   Stress:     Feeling of Stress : Not on file   Social Connections:     Frequency of Communication with Friends and Family: Not on file    Frequency of Social Gatherings with Friends and Family: Not on file    Attends Gnosticism Services: Not on file    Active Member of [address] [place] [address] or Organizations: Not on file    Attends Club or Organization Meetings: Not on file    Marital Status: Not on file   Intimate Partner Violence:     Fear of Current or Ex-Partner: Not on file    Emotionally Abused: Not on file    Physically Abused: Not on file    Sexually Abused: Not on file   Housing Stability:     Unable to Pay for Housing in the Last Year: Not on file    Number of [place] in the Last Year: Not on file    Unstable Housing in the Last Year: Not on file       Allergies   Allergen Reactions    Lisinopril Anaphylaxis     Patient experienced throat swelling and respiratory distress as a result. Current Outpatient Medications   Medication Sig Dispense Refill    allopurinoL (ZYLOPRIM) 300 mg tablet Take 1 Tablet by mouth daily. 90 Tablet 0    cetirizine (ZYRTEC) 10 mg tablet Take 1 Tablet by mouth daily. 90 Tablet 0    fluticasone propionate (FLONASE) 50 mcg/actuation nasal spray 2 Sprays by Both Nostrils route daily.  1 Each 2    ferrous sulfate 325 mg (65 mg iron) tablet Take 1 tablet by mouth daily 90 Tablet 0    baclofen 5 mg tab TAKE ONE TABLET BY MOUTH THREE TIMES A DAY AS NEEDED FOR HICCUPS 90 Tablet 0    famotidine (PEPCID) 40 mg tablet TAKE ONE TABLET BY MOUTH DAILY 30 Tablet 0    sildenafil citrate (VIAGRA) 100 mg tablet TAKE ONE TABLET BY MOUTH ONE HOUR PRIOR TO SEXUAL ACTIVITY   ( MAXIMUM DAILY DOSE 100 MG) 30 Tablet 2    celecoxib (CELEBREX) 200 mg capsule TAKE ONE CAPSULE BY MOUTH DAILY 30 Capsule 2    indomethacin (INDOCIN) 50 mg capsule       amLODIPine (NORVASC) 10 mg tablet Take 1 Tablet by mouth daily. Indications: high blood pressure 90 Tablet 1    hydroCHLOROthiazide (HYDRODIURIL) 25 mg tablet Take 1 Tablet by mouth daily. 90 Tablet 1    FeroSuL 325 mg (65 mg iron) tablet Take one tablet by mouth daily 90 Tablet 1    colchicine 0.6 mg tablet Take 1 Tablet by mouth daily. 90 Tablet 1    doxycycline (VIBRAMYCIN) 100 mg capsule       doxycycline (MONODOX) 100 mg capsule Take 100 mg by mouth two (2) times a day. Patient Care Team:  Patient Care Team:  Florence Nagy NP as PCP - General (Nurse Practitioner)  Florence Nagy NP as PCP - [facility]  Manuela Shah MD as Physician (Physical Medicine and Rehabilitation)  Ilana Asencio MD as Physician (Rheumatology)      LABS:  No results found for any visits on 01/27/22. RADIOLOGY:  No recent results      Physical Exam  Vitals and nursing note reviewed. Constitutional:       Appearance: Normal appearance. He is well-developed. Cardiovascular:      Rate and Rhythm: Normal rate and regular rhythm. Heart sounds: Normal heart sounds. Pulmonary:      Effort: Pulmonary effort is normal. No respiratory distress. Breath sounds: Normal breath sounds. No wheezing or rales. Musculoskeletal:         General: Normal range of motion. Cervical back: Normal range of motion and neck supple. Lymphadenopathy:      Cervical: No cervical adenopathy. Skin:     General: Skin is warm and dry. Neurological:      Mental Status: He is alert and oriented to person, place, and time. Deep Tendon Reflexes: Reflexes are normal and symmetric. Psychiatric:         Mood and Affect: Mood normal.           Vitals:    01/27/22 0858   BP: 130/76   Pulse: 67   Resp: 20   Temp: 98.3 °F (36.8 °C)   TempSrc: Oral   SpO2: 99%   Weight: 192 lb (87.1 kg)   Height: 6' (1.829 m)   PainSc:   4   PainLoc: Foot         Assessment and Plan    1. Essential hypertension  Will continue same regimen    2. Hiccups  Will continue same regimen    3. Liver lesion, right lobe  Following gastroenterology    4. Hiatal hernia  Following gastroenterology    5. Idiopathic gout, unspecified chronicity, unspecified site  Will continue same regimen    6. Rheumatoid arthritis involving multiple sites, unspecified whether rheumatoid factor present ([facility] [address]  Will continue same regimen    7. DDD (degenerative disc disease), cervical  Will continue same regimen    8. Right ankle pain, unspecified chronicity  Will continue to follow orthopedics    9. Right foot pain  Will continue to follow orthopedics    10. Seasonal allergic rhinitis due to pollen  Will continue same regimen    11. Medication refill    - allopurinoL (ZYLOPRIM) 300 mg tablet; Take 1 Tablet by mouth daily. Dispense: 90 Tablet; Refill: 0  - fluticasone propionate (FLONASE) 50 mcg/actuation nasal spray; 2 Sprays by Both Nostrils route daily. Dispense: 1 Each; Refill: 2  - ferrous sulfate 325 mg (65 mg iron) tablet; Take 1 tablet by mouth daily  Dispense: 90 Tablet; Refill: 0  - fluticasone propionate (FLONASE) 50 mcg/actuation nasal spray; 2 Sprays by Both Nostrils route daily. Dispense: 1 Each; Refill: 2      [facility]    Procedures      *Plan of care reviewed with patient. Patient in agreement with plan and expresses understanding. All questions answered and patient encouraged to call or RTO if further questions or concerns. Advised patient if symptoms worsen to go to nearest ER or call 911. AVS and recommendations given to patient upon discharge.

## 2022-02-04 ENCOUNTER — TELEPHONE (OUTPATIENT)
Dept: FAMILY MEDICINE CLINIC | Age: 57
End: 2022-02-04

## 2022-02-04 NOTE — TELEPHONE ENCOUNTER
Pt stated he had an appt with Dr. Hailee Dallas on 1/27/22 and she prescribed him FLONASE. Pt stated the pharmacy doesn't have his medication. He wants to know how can he get his medication. Mr. Reyes  can be reached at [phone]. Please advise.  Thank you!!!

## 2022-02-04 NOTE — TELEPHONE ENCOUNTER
Spoke with patient and confirmed pharmacy.   ([address])  Patient was able to  other prescriptions ordered the same day-this prescription was confirmed that the pharmacy received it    Spoke with pharmacy and it was not picked up they will refill and notify patient

## 2022-02-04 NOTE — TELEPHONE ENCOUNTER
Attempted to call pharmacy to confirm prescription no answer recording stated that the pharmacy was closed.   Will try again

## 2022-02-22 ENCOUNTER — TRANSCRIBE ORDER (OUTPATIENT)
Dept: SCHEDULING | Age: 57
End: 2022-02-22

## 2022-02-22 DIAGNOSIS — K76.9 LIVER LESION: Primary | ICD-10-CM

## 2022-02-28 ENCOUNTER — OFFICE VISIT (OUTPATIENT)
Dept: ORTHOPEDIC SURGERY | Age: 57
End: 2022-02-28
Payer: MEDICAID

## 2022-02-28 VITALS
TEMPERATURE: 98.1 F | HEIGHT: 72 IN | BODY MASS INDEX: 25.6 KG/M2 | OXYGEN SATURATION: 100 % | HEART RATE: 69 BPM | WEIGHT: 189 LBS

## 2022-02-28 DIAGNOSIS — M19.071 PRIMARY OSTEOARTHRITIS OF RIGHT FOOT: ICD-10-CM

## 2022-02-28 DIAGNOSIS — M19.071 PRIMARY OSTEOARTHRITIS OF RIGHT ANKLE: ICD-10-CM

## 2022-02-28 DIAGNOSIS — M10.00 IDIOPATHIC GOUT, UNSPECIFIED CHRONICITY, UNSPECIFIED SITE: Primary | ICD-10-CM

## 2022-02-28 DIAGNOSIS — M19.90 INFLAMMATORY ARTHRITIS: ICD-10-CM

## 2022-02-28 PROCEDURE — 99213 OFFICE O/P EST LOW 20 MIN: CPT | Performed by: ORTHOPAEDIC SURGERY

## 2022-02-28 NOTE — PROGRESS NOTES
AMBULATORY PROGRESS NOTE      Patient: Jayson Velazquez             MRN: [id number]     SSN: [id number] Body mass index is 25.63 kg/m². YOB: 1965     AGE: 60 y.o. EX: male    PCP: Samreen Hannah NP       IMPRESSION //  DIAGNOSIS AND TREATMENT PLAN        Jayson Velazquez has a diagnosis of:      Due to conservative care, this current time, nothing surgical to offer at this current time. Continue follow-up with his rheumatologist, Dr. Carolina Vyas. DIAGNOSES    1. Idiopathic gout, unspecified chronicity, unspecified site    2. Inflammatory arthritis    3. Primary osteoarthritis of right foot    4. Primary osteoarthritis of right ankle        No orders of the defined types were placed in this encounter. PLAN:    1. Follow-up w/ Dr. Valerio Jimenez : 3 months    There are no Patient Instructions on file for this visit. Please follow up with your PCP for any health maintenance as recommended         Jayson Velazquez  expresses understanding of the diagnosis, treatment plan, and all of their proposed questions were answered to their satisfaction. Patient education has been provided re the diagnoses. HPI //  [facility] Jovani Churchill IS A 60 y.o. male who is a/an  established patient, presenting to my outpatient office for evaluation of  the following chief complaint(s):     Chief Complaint   Patient presents with    Foot Pain     Right     Swelling     Right foot       At [facility] 59 presented w/ acute gout of multiple sites. I advise the patient to call his rheumatologist regarding the amount of medication he is taking. AVS: Yue Ley, Dr. Delcid Sensing, and Propet shoes ([employer] or [employer]). Renew Rx of  Tramadol. Since LOV Prashanth Babb continues to endorse right foot pain & swelling. He reports his rheumatologist, Dr. Carolina Vyas,  took him off the gout medication about 2 weeks ago.     Visit Vitals  Pulse 69   Temp 98.1 °F (36.7 °C) (Temporal)   Ht 6' (1.829 m)   Wt 189 lb (85.7 kg)   SpO2 100%   BMI 25.63 kg/m²       Appearance: Alert, well appearing and pleasant patient who is in no distress, oriented to person, place/time, and who follows commands. Normal dress/motor activity/thought processes/memory. This patient is accompanied in the examination room by his  self. Patient arrives to office via: without assistive device:     Psychiatric:  Normal Affect/mood. Judgement, behavior, and conduct are appropriate. Speech normal in context and clarity, memory intact grossly, no involuntary movements - tremors. H EENT (2): Head normocephalic & atraumatic. Eye: pupils are round// EOM are intact // Neck: ROM WNL  // Hearings Intact   Respiratory: Breathing non labored     ANKLE/FOOT right     Gait: normal  Tenderness: mild over  forefoot  Cutaneous: mild swelling to middle 1/3rd forefoot  Joint Motion: limited inversion & dorsalfelxion is 20 degrees  Joint / Tendon Stability:  No Ankle or Subtalar instability or joint laxity. No peroneal sublux ability or dislocation  Alignment: neutral Hindfoot,    Neuro Motor/Sensory: NL/NL  Vascular: NL foot/ankle pulses,   Lymphatics: No extremity lymphedema, No calf swelling, no tenderness to calf muscles. CHART REVIEW     Reilly Mckinley has been experiencing pain and discomfort confirmed as outlined in the pain assessment outlined below.  was reviewed by Thuan Lemus MD on 2/28/2022. Pain Assessment  2/28/2022   Location of Pain Foot   Pain Location Comment -   Location Modifiers Right; Anterior   Severity of Pain 6   Quality of Pain Sharp; Aching   Quality of Pain Comment -   Duration of Pain A few hours   Frequency of Pain Several times daily   Date Pain First Started -   Date Pain First Started Comment -   Aggravating Factors Standing   Aggravating Factors Comment -   Limiting Behavior Some   Relieving Factors Rest   Relieving Factors Comment -   Result of Injury No        Prashanth Babb has a past medical history of Arthritis, Chronic pain, Contact dermatitis and other eczema, due to unspecified cause, Depression, Gout, Headache(784.0), Hiccups, Hiccups, Hypertension, Rheumatoid arthritis(714.0) (1983), Rheumatoid arthritis(714.0), and Seasonal allergic rhinitis. He has no past medical history of Abuse, Anemia NEC, Arrhythmia, Asthma, CAD (coronary artery disease), Calculus of kidney, Cancer ([address] Chronic kidney disease, Congestive heart failure, unspecified, COPD, Diabetes ([address] GERD (gastroesophageal reflux disease), Hypercholesterolemia, Liver disease, Psychotic disorder ([address] PUD (peptic ulcer disease), Seizures ([address] Stroke ([address] Thromboembolus ([address] Thyroid disease, or Trauma. Patients is employed at:          has a past medical history of Arthritis, Chronic pain, Contact dermatitis and other eczema, due to unspecified cause, Depression, Gout, Headache(784.0), Hiccups, Hiccups, Hypertension, Rheumatoid arthritis(714.0) (1983), Rheumatoid arthritis(714.0), and Seasonal allergic rhinitis. He has no past medical history of Abuse, Anemia NEC, Arrhythmia, Asthma, CAD (coronary artery disease), Calculus of kidney, Cancer ([address] Chronic kidney disease, Congestive heart failure, unspecified, COPD, Diabetes ([address] GERD (gastroesophageal reflux disease), Hypercholesterolemia, Liver disease, Psychotic disorder ([address] PUD (peptic ulcer disease), Seizures ([address] Stroke ([address] Thromboembolus ([address] Thyroid disease, or Trauma. has a past surgical history that includes hx cyst removal (1983). family history includes Arthritis-rheumatoid in his father and mother; Cancer (age of onset: 35) in his sister; Emphysema in his father and mother; Liver Disease in his brother; Thyroid Disease in his brother. Current Outpatient Medications   Medication Sig    sildenafil citrate (VIAGRA) 100 mg tablet Take 1 Tablet by mouth daily as needed for Erectile Dysfunction.     allopurinoL (ZYLOPRIM) 300 mg tablet Take 1 Tablet by mouth daily.  cetirizine (ZYRTEC) 10 mg tablet Take 1 Tablet by mouth daily.  fluticasone propionate (FLONASE) 50 mcg/actuation nasal spray 2 Sprays by Both Nostrils route daily.  ferrous sulfate 325 mg (65 mg iron) tablet Take 1 tablet by mouth daily    baclofen 5 mg tab TAKE ONE TABLET BY MOUTH THREE TIMES A DAY AS NEEDED FOR HICCUPS    famotidine (PEPCID) 40 mg tablet TAKE ONE TABLET BY MOUTH DAILY    sildenafil citrate (VIAGRA) 100 mg tablet TAKE ONE TABLET BY MOUTH ONE HOUR PRIOR TO SEXUAL ACTIVITY   ( MAXIMUM DAILY DOSE 100 MG)    celecoxib (CELEBREX) 200 mg capsule TAKE ONE CAPSULE BY MOUTH DAILY    indomethacin (INDOCIN) 50 mg capsule     amLODIPine (NORVASC) 10 mg tablet Take 1 Tablet by mouth daily. Indications: high blood pressure    hydroCHLOROthiazide (HYDRODIURIL) 25 mg tablet Take 1 Tablet by mouth daily.  FeroSuL 325 mg (65 mg iron) tablet Take one tablet by mouth daily    colchicine 0.6 mg tablet Take 1 Tablet by mouth daily.  doxycycline (VIBRAMYCIN) 100 mg capsule     doxycycline (MONODOX) 100 mg capsule Take 100 mg by mouth two (2) times a day. No current facility-administered medications for this visit. Allergies   Allergen Reactions    Lisinopril Anaphylaxis     Patient experienced throat swelling and respiratory distress as a result. Social History     Occupational History    Not on file   Tobacco Use    Smoking status: Current Every Day Smoker     Packs/day: 1.00     Years: 25.00     Pack years: 25.00     Types: Cigarettes    Smokeless tobacco: Never Used   Vaping Use    Vaping Use: Not on file   Substance and Sexual Activity    Alcohol use: Yes     Comment: occasionally    Drug use: Not Currently     Types: OTC, Prescription    Sexual activity: Yes     Partners: Female       reports that he has been smoking cigarettes. He has a 25.00 pack-year smoking history.  He has never used smokeless tobacco. He reports current alcohol use. He reports previous drug use. Drugs: OTC and Prescription. DIAGNOSTIC LAB DATA      Lab Results   Component Value Date/Time    Hemoglobin A1c 4.4 (L) 01/19/2022 12:00 AM    Hemoglobin A1c 4.6 (L) 04/28/2021 10:03 AM    Hemoglobin A1c 4.6 (L) 09/28/2017 10:16 AM    Hemoglobin A1c, External 4.6 04/29/2021 12:00 AM    //   Lab Results   Component Value Date/Time    Glucose 93 01/19/2022 12:00 AM        Lab Results   Component Value Date/Time    Hemoglobin A1c, External 4.6 04/29/2021 12:00 AM         Lab Results   Component Value Date/Time    VITAMIN D, 25-HYDROXY 29.8 (L) 09/28/2017 10:16 AM        Drug Screen Most Recent Result Date    No resulted procedures found. REVIEW OF SYSTEMS : 2/28/2022  ALL BELOW ARE Negative except : SEE HPI     All other systems reviewed and are negative. 12 point review of systems otherwise negative unless noted in HPI. RADIOGRAPHS// IMAGING//DIAGNOSTIC DATA     No orders of the defined types were placed in this encounter. I have spent 30 minutes reviewing the previous notes, reviewing diagnostic studies [Advanced  Imaging, Diagnostic test results (x-rays)] and had a direct face to face with the patient discussing the diagnosis and importance of compliance with the treatment and plan. There is  discussion for the potential for surgery, answering all questions, as well as documenting patient care coordination for this individual on the day of the visit. Disclaimer:     Sections of this note are dictated using utilizing voice recognition software, which may have resulted in some phonetic based errors in grammar and contents. Even though attempts were made to correct all the mistakes, some may have been missed, and remained in the body of the document. If questions arise, please contact our department. An electronic signature was used to authenticate this note.     Wander Herbert may have a reminder for a \"due or due soon\" health maintenance. I have asked that he contact his primary care provider for follow-up on this health maintenance. There are no Patient Instructions on file for this visit. Please follow up with your PCP for any health maintenance as recommended. Avis Serra,as dictated by, Peña Sun.   2/28/2022  7:56 AM

## 2022-03-04 ENCOUNTER — HOSPITAL ENCOUNTER (OUTPATIENT)
Dept: CT IMAGING | Age: 57
Discharge: HOME | End: 2022-03-04
Attending: INTERNAL MEDICINE | Admitting: INTERNAL MEDICINE
Payer: MEDICAID

## 2022-03-04 VITALS
TEMPERATURE: 98.4 F | HEART RATE: 68 BPM | DIASTOLIC BLOOD PRESSURE: 77 MMHG | BODY MASS INDEX: 21.95 KG/M2 | WEIGHT: 189.7 LBS | HEIGHT: 78 IN | RESPIRATION RATE: 14 BRPM | OXYGEN SATURATION: 100 % | SYSTOLIC BLOOD PRESSURE: 143 MMHG

## 2022-03-04 DIAGNOSIS — K76.9 LIVER LESION: ICD-10-CM

## 2022-03-04 LAB
ANION GAP SERPL CALC-SCNC: 5 MMOL/L (ref 3–18)
APTT PPP: 32.4 SEC (ref 23–36.4)
BUN SERPL-MCNC: 10 MG/DL (ref 7–18)
BUN/CREAT SERPL: 13 (ref 12–20)
CALCIUM SERPL-MCNC: 9.5 MG/DL (ref 8.5–10.1)
CHLORIDE SERPL-SCNC: 104 MMOL/L (ref 100–111)
CO2 SERPL-SCNC: 29 MMOL/L (ref 21–32)
CREAT SERPL-MCNC: 0.75 MG/DL (ref 0.6–1.3)
ERYTHROCYTE [DISTWIDTH] IN BLOOD BY AUTOMATED COUNT: 15.2 % (ref 11.6–14.5)
GLUCOSE SERPL-MCNC: 93 MG/DL (ref 74–99)
HCT VFR BLD AUTO: 36.3 % (ref 36–48)
HGB BLD-MCNC: 12.5 G/DL (ref 13–16)
INR PPP: 1 (ref 0.8–1.2)
MCH RBC QN AUTO: 28.5 PG (ref 24–34)
MCHC RBC AUTO-ENTMCNC: 34.4 G/DL (ref 31–37)
MCV RBC AUTO: 82.9 FL (ref 78–100)
NRBC # BLD: 0 K/UL (ref 0–0.01)
NRBC BLD-RTO: 0 PER 100 WBC
PLATELET # BLD AUTO: 195 K/UL (ref 135–420)
PMV BLD AUTO: 11.2 FL (ref 9.2–11.8)
POTASSIUM SERPL-SCNC: 3.5 MMOL/L (ref 3.5–5.5)
PROTHROMBIN TIME: 13.5 SEC (ref 11.5–15.2)
RBC # BLD AUTO: 4.38 M/UL (ref 4.35–5.65)
SODIUM SERPL-SCNC: 138 MMOL/L (ref 136–145)
WBC # BLD AUTO: 7.3 K/UL (ref 4.6–13.2)

## 2022-03-04 PROCEDURE — 85610 PROTHROMBIN TIME: CPT

## 2022-03-04 PROCEDURE — 74011000250 HC RX REV CODE- 250: Performed by: RADIOLOGY

## 2022-03-04 PROCEDURE — 88307 TISSUE EXAM BY PATHOLOGIST: CPT

## 2022-03-04 PROCEDURE — 80048 BASIC METABOLIC PNL TOTAL CA: CPT

## 2022-03-04 PROCEDURE — 85730 THROMBOPLASTIN TIME PARTIAL: CPT

## 2022-03-04 PROCEDURE — 74011250636 HC RX REV CODE- 250/636: Performed by: RADIOLOGY

## 2022-03-04 PROCEDURE — 77030036720 CT BX LIVER NDL PERC

## 2022-03-04 PROCEDURE — 88341 IMHCHEM/IMCYTCHM EA ADD ANTB: CPT

## 2022-03-04 PROCEDURE — 85027 COMPLETE CBC AUTOMATED: CPT

## 2022-03-04 PROCEDURE — 88342 IMHCHEM/IMCYTCHM 1ST ANTB: CPT

## 2022-03-04 PROCEDURE — 88334 PATH CONSLTJ SURG CYTO XM EA: CPT

## 2022-03-04 PROCEDURE — 88333 PATH CONSLTJ SURG CYTO XM 1: CPT

## 2022-03-04 RX ORDER — SODIUM CHLORIDE 9 MG/ML
20 INJECTION, SOLUTION INTRAVENOUS CONTINUOUS
Status: DISCONTINUED | OUTPATIENT
Start: 2022-03-04 | End: 2022-03-04 | Stop reason: HOSPADM

## 2022-03-04 RX ORDER — LIDOCAINE HYDROCHLORIDE 10 MG/ML
20 INJECTION, SOLUTION EPIDURAL; INFILTRATION; INTRACAUDAL; PERINEURAL
Status: COMPLETED | OUTPATIENT
Start: 2022-03-04 | End: 2022-03-04

## 2022-03-04 RX ORDER — OXYCODONE AND ACETAMINOPHEN 5; 325 MG/1; MG/1
1 TABLET ORAL
Status: DISCONTINUED | OUTPATIENT
Start: 2022-03-04 | End: 2022-03-04 | Stop reason: HOSPADM

## 2022-03-04 RX ORDER — MIDAZOLAM HYDROCHLORIDE 1 MG/ML
.5-2 INJECTION, SOLUTION INTRAMUSCULAR; INTRAVENOUS
Status: DISPENSED | OUTPATIENT
Start: 2022-03-04 | End: 2022-03-04

## 2022-03-04 RX ORDER — FENTANYL CITRATE 50 UG/ML
12.5-5 INJECTION, SOLUTION INTRAMUSCULAR; INTRAVENOUS
Status: DISPENSED | OUTPATIENT
Start: 2022-03-04 | End: 2022-03-04

## 2022-03-04 RX ORDER — NALOXONE HYDROCHLORIDE 0.4 MG/ML
0.2 INJECTION, SOLUTION INTRAMUSCULAR; INTRAVENOUS; SUBCUTANEOUS
Status: DISCONTINUED | OUTPATIENT
Start: 2022-03-04 | End: 2022-03-04 | Stop reason: HOSPADM

## 2022-03-04 RX ORDER — FLUMAZENIL 0.1 MG/ML
0.2 INJECTION INTRAVENOUS
Status: DISCONTINUED | OUTPATIENT
Start: 2022-03-04 | End: 2022-03-04 | Stop reason: HOSPADM

## 2022-03-04 RX ADMIN — FENTANYL CITRATE 50 MCG: 50 INJECTION INTRAMUSCULAR; INTRAVENOUS at 11:50

## 2022-03-04 RX ADMIN — SODIUM CHLORIDE 20 ML/HR: 9 INJECTION, SOLUTION INTRAVENOUS at 09:45

## 2022-03-04 RX ADMIN — MIDAZOLAM 1 MG: 1 INJECTION INTRAMUSCULAR; INTRAVENOUS at 11:45

## 2022-03-04 RX ADMIN — MIDAZOLAM 1 MG: 1 INJECTION INTRAMUSCULAR; INTRAVENOUS at 11:50

## 2022-03-04 RX ADMIN — LIDOCAINE HYDROCHLORIDE 20 ML: 10 INJECTION, SOLUTION EPIDURAL; INFILTRATION; INTRACAUDAL; PERINEURAL at 11:45

## 2022-03-04 RX ADMIN — FENTANYL CITRATE 50 MCG: 50 INJECTION INTRAMUSCULAR; INTRAVENOUS at 12:00

## 2022-03-04 RX ADMIN — FENTANYL CITRATE 50 MCG: 50 INJECTION INTRAMUSCULAR; INTRAVENOUS at 11:45

## 2022-03-04 NOTE — H&P
OUTPATIENT HISTORY AND PHYSICAL      Today 3/4/2022     Indication/Symptoms:   Jayson Velazquez is a 60 y.o. male with h/o liver mass presents for an image guided liver bx    Current Meds:    Prior to Admission medications    Medication Sig Start Date End Date Taking? Authorizing Provider   fluticasone propionate (FLONASE) 50 mcg/actuation nasal spray 2 Sprays by Both Nostrils route daily. 1/27/22  Yes Samreen Hannah NP   amLODIPine (NORVASC) 10 mg tablet Take 1 Tablet by mouth daily. Indications: high blood pressure 10/20/21  Yes Samreen Hannah NP   sildenafil citrate (VIAGRA) 100 mg tablet Take 1 Tablet by mouth daily as needed for Erectile Dysfunction. 2/1/22   Surjit Sultana MD   allopurinoL (ZYLOPRIM) 300 mg tablet Take 1 Tablet by mouth daily. Patient not taking: Reported on 3/4/2022 1/27/22   Liane Hannah NP   cetirizine (ZYRTEC) 10 mg tablet Take 1 Tablet by mouth daily. 1/27/22   Tapan Hannah NP   ferrous sulfate 325 mg (65 mg iron) tablet Take 1 tablet by mouth daily 1/27/22   Samreen Hannah NP   baclofen 5 mg tab TAKE ONE TABLET BY MOUTH THREE TIMES A DAY AS NEEDED FOR HICCUPS  Patient not taking: Reported on 3/4/2022 1/11/22   Liane Hannah NP   famotidine (PEPCID) 40 mg tablet TAKE ONE TABLET BY MOUTH DAILY 1/11/22   Frederic Muñiz MD   sildenafil citrate (VIAGRA) 100 mg tablet TAKE ONE TABLET BY MOUTH ONE HOUR PRIOR TO SEXUAL ACTIVITY   ( MAXIMUM DAILY DOSE 100 MG) 12/7/21   Cynthia Fine MD   celecoxib (CELEBREX) 200 mg capsule TAKE ONE CAPSULE BY MOUTH DAILY 11/7/21   Frederic Muñiz MD   indomethacin (INDOCIN) 50 mg capsule  9/16/21   Provider, Historical   hydroCHLOROthiazide (HYDRODIURIL) 25 mg tablet Take 1 Tablet by mouth daily.  10/20/21   Liane Hannah NP   FeroSuL 325 mg (65 mg iron) tablet Take one tablet by mouth daily 10/20/21   Samreen Hannah, LYNDA   colchicine 0.6 mg tablet Take 1 Tablet by mouth daily. 7/29/21   Liane Hannah Post, NP   doxycycline (VIBRAMYCIN) 100 mg capsule  4/29/21   Provider, Historical   doxycycline (MONODOX) 100 mg capsule Take 100 mg by mouth two (2) times a day. Provider, Historical       Allergies: Allergies   Allergen Reactions    Lisinopril Anaphylaxis     Patient experienced throat swelling and respiratory distress as a result. Comorbid Conditions:    Past Medical History:   Diagnosis Date    Arthritis     Chronic pain     joints due to Rheumatoid Arthritis    Contact dermatitis and other eczema, due to unspecified cause     dry skin in scalp    Depression     Gout     Headache(784.0)     Hiccups     Hiccups     Hypertension     Rheumatoid arthritis(714.0) 1983    Rheumatoid arthritis(714.0)     Seasonal allergic rhinitis           Past Surgical History:   Procedure Laterality Date    HX CYST REMOVAL  1983    large cyst removed on left shoulder       Data:    Visit Vitals  /84 (BP 1 Location: Right arm, BP Patient Position: At rest)   Pulse 60   Temp 97.5 °F (36.4 °C)   Resp 20   Ht 6' 6\" (1.981 m)   Wt 86 kg (189 lb 11.2 oz)   SpO2 98%   BMI 21.92 kg/m²   :  Recent Labs     03/04/22  0940        Recent Labs     03/04/22  0940   INR 1.0   APTT 32.4       The H & P and/or progress notes and any available imaging were reviewed. The risks, indications and possible alternatives to the procedure, including doing nothing, were discussed and informed consent was obtained. Physical Exam:      Mental status:   Alert and oriented. Examination specific to the procedure proposed to be performed and any co morbid conditions:      Mallampati classification 3 ,  ASA III   Heart:   RRR. Lungs:   CTAB. No wheezes, rales or rhonchi. The patient is an appropriate candidate to undergo the planned procedure  and sedation.     Bella Farah PA-C

## 2022-03-04 NOTE — PROGRESS NOTES
TRANSFER - OUT REPORT:    Verbal report given to ST. EMRE MONROE RN(name) on [address]  being transferred to Phase 2(unit) for routine post - op       Report consisted of patients Situation, Background, Assessment and   Recommendations(SBAR). Information from the following report(s) SBAR, Kardex, Procedure Summary and MAR was reviewed with the receiving nurse. Lines:   Peripheral IV 03/04/22 Distal;Right Antecubital (Active)   Site Assessment Clean, dry, & intact 03/04/22 0950   Phlebitis Assessment 0 03/04/22 0950   Infiltration Assessment 0 03/04/22 0950   Dressing Status Clean, dry, & intact 03/04/22 0950   Dressing Type Tape;Transparent 03/04/22 0950   Hub Color/Line Status Pink; Infusing 03/04/22 0950        Opportunity for questions and clarification was provided.       Patient transported with:   Registered Nurse  Tech

## 2022-03-04 NOTE — DISCHARGE INSTRUCTIONS
Patient Education        Percutaneous Liver Biopsy: What to Expect at Home  Your Recovery  A needle biopsy of the liver is a procedure to take a tiny sample (biopsy) of your liver tissue. The tissue sample is looked at under a microscope. Your doctor can look for infection or other liver problems. You may have some pain where the biopsy needle entered your skin (the puncture site). You may also have pain in your shoulder. This is called referred pain. It is caused by pain traveling along a nerve near the biopsy site. The referred pain usually lasts less than 12 hours. You may have a small amount of bleeding from the puncture site. You can probably go home if you have no problems after the test. You will need to take it easy at home for 1 or 2 days after the procedure. You will probably be able to return to work and most of your usual activities after that. This care sheet gives you a general idea about how long it will take for you to recover. But each person recovers at a different pace. Follow the steps below to get better as quickly as possible. How can you care for yourself at home? Activity    · Rest when you feel tired. Getting enough sleep will help you recover.     · Try to walk each day. Start by walking a little more than you did the day before. Bit by bit, increase the amount you walk. Walking boosts blood flow and helps prevent pneumonia and constipation.     · Avoid exercises that use your belly muscles and strenuous activities, such as bicycle riding, jogging, weight lifting, or aerobic exercise, for 1 week or until your doctor says it is okay.     · Ask your doctor when you can drive again.     · You will probably need to take 1 or 2 days off from work. It depends on the type of work you do and how you feel.     · You will probably be able to shower the same day as the test, if your doctor says it is okay. Pat the puncture site dry.  Do not take a bath for at least 2 days after the test, or until your doctor tells you it is okay. Diet    · You can eat your normal diet. If your stomach is upset, try bland, low-fat foods like plain rice, broiled chicken, toast, and yogurt.     · Drink plenty of fluids (unless your doctor tells you not to). Medicines    · Your doctor will tell you if and when you can restart your medicines. He or she will also give you instructions about taking any new medicines.     · If you take aspirin or some other blood thinner, ask your doctor if and when to start taking it again. Make sure that you understand exactly what your doctor wants you to do.     · Be safe with medicines. Take pain medicines exactly as directed. ? If the doctor gave you a prescription medicine for pain, take it as prescribed. ? If you are not taking a prescription pain medicine, take an over-the-counter medicine that your doctor recommends. Read and follow all instructions on the label. ? Do not take aspirin, ibuprofen (Advil, Motrin), naproxen (Aleve), or other nonsteroidal anti-inflammatory drugs (NSAIDs) unless your doctor says it is okay.     · If you think your pain medicine is making you sick to your stomach:  ? Take your medicine after meals (unless your doctor has told you not to). ? Ask your doctor for a different kind of pain medicine. Care of the puncture site    · Keep a bandage over the puncture site for the first 1 or 2 days. Follow-up care is a key part of your treatment and safety. Be sure to make and go to all appointments, and call your doctor if you are having problems. It's also a good idea to know your test results and keep a list of the medicines you take. When should you call for help? Call 911 anytime you think you may need emergency care.  For example, call if:    · You passed out (lost consciousness).     · You have severe trouble breathing.     · You have sudden chest pain and shortness of breath, or you cough up blood.     · You have severe pain in your chest, shoulder, or belly. Call your doctor now or seek immediate medical care if:    · You have new or worse shortness of breath.     · Bright red blood has soaked through the bandage over the puncture site.     · You have pain that does not get better after you take your pain medicine.     · You are sick to your stomach or cannot keep fluids down.     · You have a fever, chills, or body aches.     · You have signs of infection, such as:  ? Increased pain, swelling, warmth, or redness. ? Red streaks leading from the puncture site. ? Pus draining from the puncture site. ? A fever.     · You have new or worse pain at the puncture site.     · You have new or worse belly swelling or bloating.     · You have trouble passing urine or stool.     · Your stools are black and tarlike or have streaks of blood.     · You have pale-colored stools along with dark urine and itching. Watch closely for changes in your health, and be sure to contact your doctor if you have any problems. Where can you learn more? Go to http://www.gray.com/  Enter K030 in the search box to learn more about \"Percutaneous Liver Biopsy: What to Expect at Home. \"  Current as of: June 17, 2021               Content Version: 13.0  © 2006-2021 Healthwise, Incorporated. Care instructions adapted under license by [employer] (which disclaims liability or warranty for this information). If you have questions about a medical condition or this instruction, always ask your healthcare professional. [place] [address] any warranty or liability for your use of this information.          DISCHARGE SUMMARY from Nurse    PATIENT INSTRUCTIONS:    After general anesthesia or intravenous sedation, for 24 hours or while taking prescription Narcotics:  · Limit your activities  · Do not drive and operate hazardous machinery  · Do not make important personal or business decisions  · Do  not drink alcoholic beverages  · If you have not urinated within 8 hours after discharge, please contact your surgeon on call. Report the following to your surgeon:  · Excessive pain, swelling, redness or odor of or around the surgical area  · Temperature over 100.5  · Nausea and vomiting lasting longer than 4 hours or if unable to take medications  · Any signs of decreased circulation or nerve impairment to extremity: change in color, persistent  numbness, tingling, coldness or increase pain  · Any questions    What to do at Home:      *  Please give a list of your current medications to your Primary Care Provider. *  Please update this list whenever your medications are discontinued, doses are      changed, or new medications (including over-the-counter products) are added. *  Please carry medication information at all times in case of emergency situations. These are general instructions for a healthy lifestyle:    No smoking/ No tobacco products/ Avoid exposure to second hand smoke  Surgeon General's Warning:  Quitting smoking now greatly reduces serious risk to your health. Obesity, smoking, and sedentary lifestyle greatly increases your risk for illness    A healthy diet, regular physical exercise & weight monitoring are important for maintaining a healthy lifestyle    You may be retaining fluid if you have a history of heart failure or if you experience any of the following symptoms:  Weight gain of 3 pounds or more overnight or 5 pounds in a week, increased swelling in our hands or feet or shortness of breath while lying flat in bed. Please call your doctor as soon as you notice any of these symptoms; do not wait until your next office visit. The discharge information has been reviewed with the patient. The patient verbalized understanding.   Discharge medications reviewed with the patient and appropriate educational materials and side effects teaching were provided.   ___________________________________________________________________________________________________________________________________

## 2022-03-05 NOTE — PROCEDURES
RADIOLOGY POST PROCEDURE NOTE     March 4, 2022       7:25 PM     Preoperative Diagnosis:   Liver mass. Postoperative Diagnosis:  Same. :  Dr. Debby Goldstein    Assistant:  None. Type of Anesthesia: 1% plain lidocaine and IV moderate sedation. Procedure/Description:  Image guided left hepatic lobe mass core needle Bx. Findings:   No bleeding. Estimated blood Loss:  Minimal    Specimen Removed:   yes    Blood transfusions:  None. Implants:  None.     Complications: None    Condition: Stable    Discharge Plan:  discharge home     Randy De Jesus MD

## 2022-03-19 PROBLEM — M54.2 CERVICALGIA: Status: ACTIVE | Noted: 2018-08-28

## 2022-03-19 PROBLEM — M10.9 GOUT: Status: ACTIVE | Noted: 2021-07-29

## 2022-03-19 PROBLEM — M06.9 RHEUMATOID ARTHRITIS INVOLVING MULTIPLE SITES (HCC): Status: ACTIVE | Noted: 2018-06-06

## 2022-03-20 PROBLEM — M47.812 CERVICAL SPONDYLOSIS WITHOUT MYELOPATHY: Status: ACTIVE | Noted: 2018-06-06

## 2022-03-20 PROBLEM — M50.30 DDD (DEGENERATIVE DISC DISEASE), CERVICAL: Status: ACTIVE | Noted: 2018-06-06

## 2022-03-23 ENCOUNTER — TRANSCRIBE ORDER (OUTPATIENT)
Dept: SCHEDULING | Age: 57
End: 2022-03-23

## 2022-03-23 DIAGNOSIS — C24.8 MALIGNANT NEOPLASM GALLBLADDER AND EXTRAHEPATIC BILE DUCTS (HCC): Primary | ICD-10-CM

## 2022-03-25 ENCOUNTER — TELEPHONE (OUTPATIENT)
Dept: GENERAL RADIOLOGY | Age: 57
End: 2022-03-25

## 2022-03-25 DIAGNOSIS — C22.1 CHOLANGIOCARCINOMA (HCC): Primary | ICD-10-CM

## 2022-03-25 NOTE — TELEPHONE ENCOUNTER
Interventional Radiology Clinic Consultation Note    Patient: Lamar Linares         Sex: male      Date of Visit: 3/25/22       YOB: 1965      Age:  60 y.o. Referring Physician: Emily Givens MD          HPI:     Lamar Linares is a 60 y.o. male who has been seen in evaluation of cholangiocarcinoma at the request of Dr. Emily Givens. Past medical history is significant for findings of a liver lesion on CT in September at [facility] during hospitalization for intractable hiccups. He follows with GI as an outpatient. Liver biopsy performed 3/04/22 was positive for cholangiocarcinoma. The patient has been referred back to interventional radiology for local hepatic treatment of disease. He follows with Dr. Tushar San for oncology. Most recent MR imaging obtained on February 1, 2022 describes the hepatic lesion as a central liver mass measuring 5.5 x 5.5 cm. The patient reports he is not consumed alcohol since his liver mass was read. He endorses diffuse abdominal discomfort that feels like gas pains but is intermittent in nature. He reports feeling bloated. He endorses a 4 pound weight gain since diagnosis. Today, the patient denies current abdominal pain, anorexia, edema, jaundice, asterixis, confusion, easy bruising, easy bleeding, or current alcohol use.       Past Medical History:   Diagnosis Date    Arthritis     Chronic pain     joints due to Rheumatoid Arthritis    Contact dermatitis and other eczema, due to unspecified cause     dry skin in scalp    Depression     Gout     Headache(784.0)     Hiccups     Hiccups     Hypertension     Rheumatoid arthritis(714.0) 1983    Rheumatoid arthritis(714.0)     Seasonal allergic rhinitis      Past Surgical History:   Procedure Laterality Date    HX CYST REMOVAL  1983    large cyst removed on left shoulder     Social History     Socioeconomic History    Marital status:    Tobacco Use    Smoking status: Current Every Day Smoker     Packs/day: 1.00     Years: 25.00     Pack years: 25.00     Types: Cigarettes    Smokeless tobacco: Never Used   Substance and Sexual Activity    Alcohol use: Yes     Comment: occasionally    Drug use: Not Currently     Types: OTC, Prescription    Sexual activity: Yes     Partners: Female     Prior to Admission medications    Medication Sig Start Date End Date Taking? Authorizing Provider   sildenafil citrate (VIAGRA) 100 mg tablet Take 1 Tablet by mouth daily as needed for Erectile Dysfunction. 2/1/22   Mariah Humphrey MD   allopurinoL (ZYLOPRIM) 300 mg tablet Take 1 Tablet by mouth daily. Patient not taking: Reported on 3/4/2022 1/27/22   Van Hannah NP   cetirizine (ZYRTEC) 10 mg tablet Take 1 Tablet by mouth daily. 1/27/22   Samreen Hannah NP   fluticasone propionate (FLONASE) 50 mcg/actuation nasal spray 2 Sprays by Both Nostrils route daily. 1/27/22   Vaughn Hannah NP   ferrous sulfate 325 mg (65 mg iron) tablet Take 1 tablet by mouth daily 1/27/22   Samreen Hannah NP   baclofen 5 mg tab TAKE ONE TABLET BY MOUTH THREE TIMES A DAY AS NEEDED FOR HICCUPS  Patient not taking: Reported on 3/4/2022 1/11/22   Van Hannah NP   famotidine (PEPCID) 40 mg tablet TAKE ONE TABLET BY MOUTH DAILY 1/11/22   Sami Nowak MD   sildenafil citrate (VIAGRA) 100 mg tablet TAKE ONE TABLET BY MOUTH ONE HOUR PRIOR TO SEXUAL ACTIVITY   ( MAXIMUM DAILY DOSE 100 MG) 12/7/21   Cynthia Fine MD   celecoxib (CELEBREX) 200 mg capsule TAKE ONE CAPSULE BY MOUTH DAILY 11/7/21   Sami Nowak MD   indomethacin (INDOCIN) 50 mg capsule  9/16/21   Provider, Historical   amLODIPine (NORVASC) 10 mg tablet Take 1 Tablet by mouth daily. Indications: high blood pressure 10/20/21   Samreen Hannah NP   hydroCHLOROthiazide (HYDRODIURIL) 25 mg tablet Take 1 Tablet by mouth daily.  10/20/21   Van Hannah NP   FeroSuL 325 mg (65 mg iron) tablet Take one tablet by mouth daily 10/20/21   Samreen Hannah NP   colchicine 0.6 mg tablet Take 1 Tablet by mouth daily. 7/29/21   Opal Hannah NP   doxycycline (VIBRAMYCIN) 100 mg capsule  4/29/21   Provider, Historical   doxycycline (MONODOX) 100 mg capsule Take 100 mg by mouth two (2) times a day. Provider, Historical     Allergies   Allergen Reactions    Lisinopril Anaphylaxis     Patient experienced throat swelling and respiratory distress as a result. Review of Systems  Pertinent items are noted in the History of Present Illness. Physical Exam:   The physical exam was deferred    Labs Reviewed and notable for: From 1/19/2022    AST / ALT: 27/15  T Bili: 0.5  Albumin: 4.4  Platelets: 872  INR: 1.0    Assessment     Manish Wakefield is a 60 y.o. male with cholangiocarcinoma of the liver    Liver nodule number and sizes: 5.5 x 5.5 cm central liver lesion segment 8    Patient is not currently being considered for surgery    ECOG 0    Goal of treatment - delaying time to progression of disease    The liver mass is located in segment 8 and upon review, would be best treated with a radiation segmentectomy versus chemoembolization from an interventional radiology standpoint at this time. The anticipated procedure was discussed in detail with the patient, including risk of injury, infection, and bleeding. Specifically, risk of liver failure, nontarget embolization, nontargeted radiation, and post embolic syndrome were discussed in detail. Plan   Case and images reviewed by Dr. Garcia Mittal. The plan is as follows:    1. CT liver tumor protocol to be performed at [facility]    2. Mesenteric angiography with subsequent lung shunt study will be performed by IR    3. Pending above, the patient will be scheduled for Y90 radioembolization    4.  The patient will be discharged home postprocedure with prescription for 2 weeks of pain and nausea control, omeprazole, and a 10-day prescription of Levaquin. 5. IR clinic follow-up with MRI liver tumor protocol and associated labs (CBC, CMP, INR, AFP with L3 percent) will be ordered for 1 month post procedure    I have spent 45 minutes with the patient with greater than 50% of the time dedicated to the patient's counseling as well as the coordination of patient care.     Thank you,  SUSAN Bangura

## 2022-03-28 ENCOUNTER — TRANSCRIBE ORDER (OUTPATIENT)
Dept: SCHEDULING | Age: 57
End: 2022-03-28

## 2022-03-28 DIAGNOSIS — C22.1 CHOLANGIOCARCINOMA (HCC): Primary | ICD-10-CM

## 2022-03-29 ENCOUNTER — HOSPITAL ENCOUNTER (OUTPATIENT)
Dept: CT IMAGING | Age: 57
Discharge: HOME | End: 2022-03-29
Attending: INTERNAL MEDICINE
Payer: MEDICAID

## 2022-03-29 DIAGNOSIS — C24.8 MALIGNANT NEOPLASM GALLBLADDER AND EXTRAHEPATIC BILE DUCTS (HCC): ICD-10-CM

## 2022-03-29 DIAGNOSIS — C22.1 CHOLANGIOCARCINOMA (HCC): ICD-10-CM

## 2022-03-29 PROCEDURE — 74011000636 HC RX REV CODE- 636: Performed by: INTERNAL MEDICINE

## 2022-03-29 PROCEDURE — 74178 CT ABD&PLV WO CNTR FLWD CNTR: CPT

## 2022-03-29 RX ADMIN — IOPAMIDOL 120 ML: 755 INJECTION, SOLUTION INTRAVENOUS at 15:44

## 2022-04-01 ENCOUNTER — TRANSCRIBE ORDER (OUTPATIENT)
Dept: INTERVENTIONAL RADIOLOGY/VASCULAR | Age: 57
End: 2022-04-01

## 2022-04-01 DIAGNOSIS — C22.0 HEPATOCELLULAR CARCINOMA (HCC): Primary | ICD-10-CM

## 2022-04-13 ENCOUNTER — HOSPITAL ENCOUNTER (OUTPATIENT)
Dept: INTERVENTIONAL RADIOLOGY/VASCULAR | Age: 57
Discharge: HOME | End: 2022-04-13
Attending: RADIOLOGY | Admitting: RADIOLOGY
Payer: MEDICAID

## 2022-04-13 ENCOUNTER — APPOINTMENT (OUTPATIENT)
Dept: CT IMAGING | Age: 57
End: 2022-04-13
Attending: PHYSICIAN ASSISTANT
Payer: MEDICAID

## 2022-04-13 ENCOUNTER — TRANSCRIBE ORDER (OUTPATIENT)
Dept: SCHEDULING | Age: 57
End: 2022-04-13

## 2022-04-13 DIAGNOSIS — C22.0 HEPATOCELLULAR CARCINOMA (HCC): ICD-10-CM

## 2022-04-13 PROCEDURE — 74011000636 HC RX REV CODE- 636: Performed by: RADIOLOGY

## 2022-04-13 PROCEDURE — 74178 CT ABD&PLV WO CNTR FLWD CNTR: CPT

## 2022-04-13 RX ORDER — SODIUM CHLORIDE 9 MG/ML
25 INJECTION, SOLUTION INTRAVENOUS CONTINUOUS
Status: DISCONTINUED | OUTPATIENT
Start: 2022-04-13 | End: 2022-04-13 | Stop reason: HOSPADM

## 2022-04-13 RX ADMIN — IOPAMIDOL 100 ML: 755 INJECTION, SOLUTION INTRAVENOUS at 15:56

## 2022-04-13 NOTE — PROGRESS NOTES
Patient had 1 egg and a cup of coffee for breakfast this morning. RN informed MD, who suggested for pt to reschedule. Plan for PA to come to bedside and talk to patient and son in length about procedure and prep instructions.

## 2022-04-13 NOTE — PROGRESS NOTES
Interventional Radiology    The patient presented today for MAA shunt study. PMHx cholangiocarcinoma. He is not NPO (ate breakfast at 0800) and does not have a pre procedure CT liver tumor protocol. Discussed with the patient - he reports that he never received a call from our scheduling nurse yesterday. CT has been ordered and will be done while the patient is here. He will be rescheduled ASAP for his MAA shunt study. The patient is agreeable to the above plan.       Thank you,  Earl Farris, [address]

## 2022-04-13 NOTE — PROGRESS NOTES
PIV removed after CT scan. Site c/d/i, no hematoma or bleeding present. Pt given discharge instructions/pre-op instructions for IR procedure on 4/18/22.

## 2022-04-13 NOTE — DISCHARGE INSTRUCTIONS
Patient Education        Learning About Tumor Embolization and Ablation  What are tumor embolization and tumor ablation? These are two ways to treat certain types of tumors without using surgery. These treatments may be used alone or together. They may also be used with other treatments. They may be an option when surgery is not possible or is too risky. Tumor embolization treats a tumor by cutting off its blood supply. Without blood, the tumor will shrink or at least grow more slowly. The doctor puts a substance into the blood vessel that supplies or feeds the tumor. Several substances can be used to block blood flow. They may include particles, chemotherapy, or tiny beads. The tiny beads may contain chemotherapy or radiation. Embolization is sometimes done to shrink a tumor before tumor ablation. It may also be done before surgery because it reduces bleeding. This makes the tumor easier to see. Tumor ablation is a way to destroy tumors. It may be done using:  · Heat. Radio waves may be used to burn the tumor. This is called radiofrequency ablation. Other ways to apply heat include using microwaves, lasers, or ultrasound. · Cold. A very cold gas is used to freeze the tumor. This is called cryotherapy or cryoablation. · Chemicals. A chemical is injected into the tumor. This is called chemical ablation. Alcohol (ethanol) is often used. Ablation may be a good option for smaller tumors. It may not work well in larger tumors. How are they done? For both procedures, the doctor uses ultrasound, a CT scan, or other imaging to guide the treatment. Tumor embolization is usually done through an artery. This is called arterial or trans-arterial embolization. A thin tube called a catheter is inserted into a large artery, often one near the groin. Then the doctor moves the catheter into the smaller artery that supplies blood to the tumor.  The substance that will block the blood supply is placed in the artery near the tumor. Then the catheter is removed. Tumor ablation is done using a special needle called a probe. The doctor puts it through the skin and into the tumor. The probe sends heat, cold, or chemicals into the tumor. If the tumor is large, the doctor may repeat the process from a different angle. This is to make sure that all parts of the tumor are treated. After the treatment, the doctor removes the probe. What can you expect after these treatments? You may be able to go home the same day. But in some cases, you might need to stay in the hospital overnight or longer. You will have a bandage over your skin where the probe or catheter was inserted. This area may be sore for a day or two. You will have tests in the months after the procedure to see how well the treatment worked. Follow-up care is a key part of your treatment and safety. Be sure to make and go to all appointments, and call your doctor if you are having problems. It's also a good idea to know your test results and keep a list of the medicines you take. Where can you learn more? Go to http://www.gray.com/  Enter O142 in the search box to learn more about \"Learning About Tumor Embolization and Ablation. \"  Current as of: September 8, 2021               Content Version: 13.2  © 2006-2022 [employer]. Care instructions adapted under license by [employer] (which disclaims liability or warranty for this information). If you have questions about a medical condition or this instruction, always ask your healthcare professional. [place] [address] any warranty or liability for your use of this information. Patient Education        Tumor Embolization for Liver Cancer: Before Your Procedure  What is tumor embolization? Tumor embolization shrinks a liver tumor by cutting off its blood supply. You may get medicine to help you relax and to help with pain before the procedure.  The doctor will insert a thin, flexible tube into an artery near your groin. Or the tube may be put in your arm. This tube is called a catheter. The doctor will guide it into the artery that supplies blood to the tumor. Then the doctor will send a dye through the catheter into the artery. The dye is called contrast material. It shows up on X-ray pictures. It allows the doctor to check blood flow to the liver and the tumor. The doctor will send small particles, chemotherapy, or tiny beads through the catheter into the artery. This stops blood flow to the tumor, causing it to slowly shrink. The tiny beads may contain chemotherapy or radiation to help kill the tumor cells. Bit by bit, the tumor will be replaced with scar tissue in the months after this is done. This should not affect your liver's ability to do its job. How do you prepare for the procedure? Procedures can be stressful. This information will help you understand what you can expect. And it will help you safely prepare for your procedure. Preparing for the procedure    · Be sure you have someone to take you home. Anesthesia and pain medicine will make it unsafe for you to drive or get home on your own.     · Understand exactly what procedure is planned, along with the risks, benefits, and other options.     · If you take aspirin or some other blood thinner, ask your doctor if you should stop taking it before your procedure. Make sure that you understand exactly what your doctor wants you to do. These medicines increase the risk of bleeding.     · Tell your doctor ALL the medicines, vitamins, supplements, and herbal remedies you take. Some may increase the risk of problems during your procedure. Your doctor will tell you if you should stop taking any of them before the procedure and how soon to do it.     · Make sure your doctor and the hospital have a copy of your advance directive. If you don't have one, you may want to prepare one.  It lets others know your health care wishes. It's a good thing to have before any type of surgery or procedure. What happens on the day of the procedure? · Follow the instructions exactly about when to stop eating and drinking. If you don't, your procedure may be canceled. If your doctor told you to take your medicines on the day of the procedure, take them with only a sip of water.     · Take a bath or shower before you come in for your procedure. Do not apply lotions, perfumes, deodorants, or nail polish.     · Remove all jewelry, piercings, and contact lenses. At the hospital or surgery center   · Bring a picture ID.     · Tell your doctor about any allergies you have.     · You will be kept comfortable and safe by your anesthesia provider. The anesthesia may make you sleep. Or it may just numb the area being worked on.     · The procedure will take about 1 to 3 hours. When should you call your doctor? · You have questions or concerns.     · You don't understand how to prepare for your procedure.     · You become ill before the procedure (such as fever, flu, or a cold).     · You need to reschedule or have changed your mind about having the procedure. Current as of: September 8, 2021               Content Version: 13.2  © 2006-2022 Healthwise, Incorporated. Care instructions adapted under license by [employer] (which disclaims liability or warranty for this information). If you have questions about a medical condition or this instruction, always ask your healthcare professional. [place] [address] any warranty or liability for your use of this information.

## 2022-04-14 NOTE — H&P
Interventional Radiology      Patient seen in follow up for  [facility] 75.. Mapping angiography     Pt ate full breakfast    Procedure was cancelled and will be rescheduled.     Pt will have CT liver tumor protocol done today however    Pt agreed and understood the above discussion and plan

## 2022-04-18 ENCOUNTER — HOSPITAL ENCOUNTER (OUTPATIENT)
Dept: INTERVENTIONAL RADIOLOGY/VASCULAR | Age: 57
Discharge: HOME | End: 2022-04-18
Attending: RADIOLOGY | Admitting: RADIOLOGY
Payer: MEDICAID

## 2022-04-18 ENCOUNTER — APPOINTMENT (OUTPATIENT)
Dept: NUCLEAR MEDICINE | Age: 57
End: 2022-04-18
Attending: RADIOLOGY
Payer: MEDICAID

## 2022-04-18 VITALS
DIASTOLIC BLOOD PRESSURE: 67 MMHG | RESPIRATION RATE: 22 BRPM | SYSTOLIC BLOOD PRESSURE: 104 MMHG | HEART RATE: 52 BPM | OXYGEN SATURATION: 100 % | BODY MASS INDEX: 26.55 KG/M2 | HEIGHT: 72 IN | WEIGHT: 196 LBS

## 2022-04-18 DIAGNOSIS — C22.0 HEPATOCELLULAR CARCINOMA (HCC): ICD-10-CM

## 2022-04-18 LAB
ALBUMIN SERPL-MCNC: 4.1 G/DL (ref 3.4–5)
ALBUMIN/GLOB SERPL: 1.2 {RATIO} (ref 0.8–1.7)
ALP SERPL-CCNC: 87 U/L (ref 45–117)
ALT SERPL-CCNC: 25 U/L (ref 16–61)
ANION GAP SERPL CALC-SCNC: 6 MMOL/L (ref 3–18)
APTT PPP: 34.6 SEC (ref 23–36.4)
AST SERPL-CCNC: 30 U/L (ref 10–38)
BASOPHILS # BLD: 0.1 K/UL (ref 0–0.1)
BASOPHILS NFR BLD: 1 % (ref 0–2)
BILIRUB DIRECT SERPL-MCNC: 0.2 MG/DL (ref 0–0.2)
BILIRUB SERPL-MCNC: 0.5 MG/DL (ref 0.2–1)
BUN SERPL-MCNC: 13 MG/DL (ref 7–18)
BUN/CREAT SERPL: 15 (ref 12–20)
CALCIUM SERPL-MCNC: 9.5 MG/DL (ref 8.5–10.1)
CHLORIDE SERPL-SCNC: 108 MMOL/L (ref 100–111)
CO2 SERPL-SCNC: 25 MMOL/L (ref 21–32)
CREAT SERPL-MCNC: 0.88 MG/DL (ref 0.6–1.3)
DIFFERENTIAL METHOD BLD: ABNORMAL
EOSINOPHIL # BLD: 0 K/UL (ref 0–0.4)
EOSINOPHIL NFR BLD: 1 % (ref 0–5)
ERYTHROCYTE [DISTWIDTH] IN BLOOD BY AUTOMATED COUNT: 15.2 % (ref 11.6–14.5)
GLOBULIN SER CALC-MCNC: 3.3 G/DL (ref 2–4)
GLUCOSE SERPL-MCNC: 100 MG/DL (ref 74–99)
HCT VFR BLD AUTO: 33.8 % (ref 36–48)
HGB BLD-MCNC: 11.5 G/DL (ref 13–16)
IMM GRANULOCYTES # BLD AUTO: 0 K/UL (ref 0–0.04)
IMM GRANULOCYTES NFR BLD AUTO: 0 % (ref 0–0.5)
INR PPP: 1 (ref 0.8–1.2)
LYMPHOCYTES # BLD: 2 K/UL (ref 0.9–3.6)
LYMPHOCYTES NFR BLD: 28 % (ref 21–52)
MCH RBC QN AUTO: 27.4 PG (ref 24–34)
MCHC RBC AUTO-ENTMCNC: 34 G/DL (ref 31–37)
MCV RBC AUTO: 80.5 FL (ref 78–100)
MONOCYTES # BLD: 0.9 K/UL (ref 0.05–1.2)
MONOCYTES NFR BLD: 13 % (ref 3–10)
NEUTS SEG # BLD: 4 K/UL (ref 1.8–8)
NEUTS SEG NFR BLD: 57 % (ref 40–73)
NRBC # BLD: 0 K/UL (ref 0–0.01)
NRBC BLD-RTO: 0 PER 100 WBC
PLATELET # BLD AUTO: 185 K/UL (ref 135–420)
PMV BLD AUTO: 12.4 FL (ref 9.2–11.8)
POTASSIUM SERPL-SCNC: 3.9 MMOL/L (ref 3.5–5.5)
PROT SERPL-MCNC: 7.4 G/DL (ref 6.4–8.2)
PROTHROMBIN TIME: 12.7 SEC (ref 11.5–15.2)
RBC # BLD AUTO: 4.2 M/UL (ref 4.35–5.65)
SODIUM SERPL-SCNC: 139 MMOL/L (ref 136–145)
WBC # BLD AUTO: 7 K/UL (ref 4.6–13.2)

## 2022-04-18 PROCEDURE — 37242 VASC EMBOLIZE/OCCLUDE ARTERY: CPT

## 2022-04-18 PROCEDURE — 74011000250 HC RX REV CODE- 250: Performed by: RADIOLOGY

## 2022-04-18 PROCEDURE — 80048 BASIC METABOLIC PNL TOTAL CA: CPT

## 2022-04-18 PROCEDURE — 80076 HEPATIC FUNCTION PANEL: CPT

## 2022-04-18 PROCEDURE — 74011000636 HC RX REV CODE- 636: Performed by: RADIOLOGY

## 2022-04-18 PROCEDURE — 85730 THROMBOPLASTIN TIME PARTIAL: CPT

## 2022-04-18 PROCEDURE — 74011250636 HC RX REV CODE- 250/636

## 2022-04-18 PROCEDURE — 85610 PROTHROMBIN TIME: CPT

## 2022-04-18 PROCEDURE — 74011250636 HC RX REV CODE- 250/636: Performed by: RADIOLOGY

## 2022-04-18 PROCEDURE — 85025 COMPLETE CBC W/AUTO DIFF WBC: CPT

## 2022-04-18 RX ORDER — FLUMAZENIL 0.1 MG/ML
0.2 INJECTION INTRAVENOUS
Status: DISPENSED | OUTPATIENT
Start: 2022-04-18 | End: 2022-04-18

## 2022-04-18 RX ORDER — NALOXONE HYDROCHLORIDE 0.4 MG/ML
0.2 INJECTION, SOLUTION INTRAMUSCULAR; INTRAVENOUS; SUBCUTANEOUS
Status: DISPENSED | OUTPATIENT
Start: 2022-04-18 | End: 2022-04-18

## 2022-04-18 RX ORDER — FENTANYL CITRATE 50 UG/ML
12.5-1 INJECTION, SOLUTION INTRAMUSCULAR; INTRAVENOUS
Status: DISPENSED | OUTPATIENT
Start: 2022-04-18 | End: 2022-04-18

## 2022-04-18 RX ORDER — DIPHENHYDRAMINE HYDROCHLORIDE 50 MG/ML
25-50 INJECTION, SOLUTION INTRAMUSCULAR; INTRAVENOUS ONCE
Status: DISCONTINUED | OUTPATIENT
Start: 2022-04-18 | End: 2022-04-18 | Stop reason: HOSPADM

## 2022-04-18 RX ORDER — ONDANSETRON 2 MG/ML
INJECTION INTRAMUSCULAR; INTRAVENOUS
Status: COMPLETED
Start: 2022-04-18 | End: 2022-04-18

## 2022-04-18 RX ORDER — CIPROFLOXACIN 2 MG/ML
400 INJECTION, SOLUTION INTRAVENOUS ONCE
Status: COMPLETED | OUTPATIENT
Start: 2022-04-18 | End: 2022-04-18

## 2022-04-18 RX ORDER — HEPARIN SODIUM 200 [USP'U]/100ML
INJECTION, SOLUTION INTRAVENOUS
Status: DISCONTINUED
Start: 2022-04-18 | End: 2022-04-18 | Stop reason: HOSPADM

## 2022-04-18 RX ORDER — DIPHENHYDRAMINE HYDROCHLORIDE 50 MG/ML
INJECTION, SOLUTION INTRAMUSCULAR; INTRAVENOUS
Status: COMPLETED
Start: 2022-04-18 | End: 2022-04-18

## 2022-04-18 RX ORDER — SODIUM CHLORIDE 9 MG/ML
25 INJECTION, SOLUTION INTRAVENOUS CONTINUOUS
Status: DISCONTINUED | OUTPATIENT
Start: 2022-04-18 | End: 2022-04-18 | Stop reason: HOSPADM

## 2022-04-18 RX ORDER — IODIXANOL 320 MG/ML
200 INJECTION, SOLUTION INTRAVASCULAR
Status: COMPLETED | OUTPATIENT
Start: 2022-04-18 | End: 2022-04-18

## 2022-04-18 RX ORDER — ONDANSETRON 2 MG/ML
4 INJECTION INTRAMUSCULAR; INTRAVENOUS AS NEEDED
Status: DISCONTINUED | OUTPATIENT
Start: 2022-04-18 | End: 2022-04-18 | Stop reason: HOSPADM

## 2022-04-18 RX ORDER — LIDOCAINE HYDROCHLORIDE 10 MG/ML
30 INJECTION, SOLUTION EPIDURAL; INFILTRATION; INTRACAUDAL; PERINEURAL ONCE
Status: COMPLETED | OUTPATIENT
Start: 2022-04-18 | End: 2022-04-18

## 2022-04-18 RX ORDER — MIDAZOLAM HYDROCHLORIDE 1 MG/ML
.5-2 INJECTION, SOLUTION INTRAMUSCULAR; INTRAVENOUS
Status: DISPENSED | OUTPATIENT
Start: 2022-04-18 | End: 2022-04-18

## 2022-04-18 RX ADMIN — ONDANSETRON 4 MG: 2 INJECTION INTRAMUSCULAR; INTRAVENOUS at 13:52

## 2022-04-18 RX ADMIN — MIDAZOLAM 0.5 MG: 1 INJECTION INTRAMUSCULAR; INTRAVENOUS at 12:40

## 2022-04-18 RX ADMIN — FENTANYL CITRATE 50 MCG: 50 INJECTION INTRAMUSCULAR; INTRAVENOUS at 12:00

## 2022-04-18 RX ADMIN — LIDOCAINE HYDROCHLORIDE 15 ML: 10 INJECTION, SOLUTION EPIDURAL; INFILTRATION; INTRACAUDAL; PERINEURAL at 12:00

## 2022-04-18 RX ADMIN — FENTANYL CITRATE 25 MCG: 50 INJECTION INTRAMUSCULAR; INTRAVENOUS at 12:30

## 2022-04-18 RX ADMIN — MIDAZOLAM 0.5 MG: 1 INJECTION INTRAMUSCULAR; INTRAVENOUS at 12:15

## 2022-04-18 RX ADMIN — IODIXANOL 150 ML: 320 INJECTION, SOLUTION INTRAVASCULAR at 12:30

## 2022-04-18 RX ADMIN — DIPHENHYDRAMINE HYDROCHLORIDE 25 MG: 50 INJECTION, SOLUTION INTRAMUSCULAR; INTRAVENOUS at 13:45

## 2022-04-18 RX ADMIN — MIDAZOLAM 1 MG: 1 INJECTION INTRAMUSCULAR; INTRAVENOUS at 12:00

## 2022-04-18 RX ADMIN — FENTANYL CITRATE 25 MCG: 50 INJECTION INTRAMUSCULAR; INTRAVENOUS at 12:15

## 2022-04-18 RX ADMIN — CIPROFLOXACIN 400 MG: 2 INJECTION, SOLUTION INTRAVENOUS at 12:00

## 2022-04-18 NOTE — ROUTINE PROCESS
TRANSFER - IN REPORT:    Verbal report received from Leatha from [employer] on [address]  being received from cath lab for routine post - op      Report consisted of patients Situation, Background, Assessment and   Recommendations(SBAR). Information from the following report(s) SBAR, Procedure Summary and MAR was reviewed with the receiving nurse. Opportunity for questions and clarification was provided. Assessment completed upon patients arrival to unit and care assumed.

## 2022-04-18 NOTE — PROCEDURES
RADIOLOGY POST PROCEDURE NOTE     April 18, 2022       2:58 PM     Preoperative Diagnosis:   Cholangiocarcinoma. Postoperative Diagnosis:  Same. :  Dr. Ena White    Assistant:  None. Type of Anesthesia: 1% plain lidocaine and IV moderate sedation with Versed and Fentanyl. Procedure/Description:  Image guided mesenteric angiography with lung shunt fraction analysis. Findings:   No bleeding. Estimated blood Loss:  Minimal    Specimen Removed:   no    Blood transfusions:  None. Implants:  None.     Complications: None    Condition: Stable    Discharge Plan:  discharge home  if stable and no bleeding    Kevin Waldron MD

## 2022-04-18 NOTE — DISCHARGE INSTRUCTIONS
Patient Education        Tumor Embolization for Liver Cancer: What to Expect at [address] [facility]     Tumor embolization is a procedure to shrink a liver tumor by cutting off its blood supply. The doctor put a thin, flexible tube, called a catheter, into an artery near your groin or in your arm. The catheter was guided into the liver artery (the hepatic artery) that supplies blood to the tumor. The doctor sent small particles, chemotherapy, or tiny beads through the catheter into the hepatic artery. This stops blood flow to the tumor, causing it to slowly shrink. The tiny beads may contain chemotherapy or radiation to help kill the tumor cells. You may go home the same day. Or you might need to stay in the hospital overnight or longer. The area where the catheter was put through your skin into your artery (the puncture site) may be sore for a day or two after the procedure. You will probably have a bruise for at least a week. You may feel like you have the flu and may feel tired and have a low fever and an upset stomach. You may not feel as hungry as you usually do. This is common. These symptoms usually get better in 1 to 2 weeks. It may take a month or more to fully get your energy back. You will have tests in the months after the procedure to check the liver tumor and see how well the treatment worked. This care sheet gives you a general idea about how long it will take for you to recover. But each person recovers at a different pace. Follow the steps below to feel better as quickly as possible. How can you care for yourself at home? Activity    · Rest when you feel tired. Getting enough sleep will help you recover.     · Try to walk each day. Start by walking a little more than you did the day before. Bit by bit, increase the amount you walk. Walking boosts blood flow and helps prevent pneumonia and constipation.  If you feel unsteady, have someone walk with you.     · Try not to walk up stairs for the first couple of days.     · Avoid strenuous activities, such as bicycle riding, jogging, weight lifting, or aerobic exercise, for at least 2 days or until your doctor says it is okay.     · For 2 to 3 days, avoid lifting anything that would make you strain. This may include a child, heavy grocery bags and milk containers, a heavy briefcase or backpack, cat litter or dog food bags, or a vacuum .     · You may shower 48 hours after the procedure, if your doctor says it is okay. Tape a plastic bag over the puncture site when you shower. Do not take a bath for the first 5 days, or until your doctor tells you it is okay.     · Ask your doctor when you can drive again.     · It may take a month or more to fully get your energy back. Diet    · You can eat your normal diet. If your stomach is upset, try bland, low-fat foods like plain rice, broiled chicken, toast, and yogurt.     · Drink plenty of fluids (unless your doctor tells you not to). Medicines    · Your doctor will tell you if and when you can restart your medicines. You will also get instructions about taking any new medicines.     · If you take aspirin or some other blood thinner, ask your doctor if and when to start taking it again. Make sure that you understand exactly what your doctor wants you to do.     · Be safe with medicines. Take pain medicines exactly as directed. ? If the doctor gave you a prescription medicine for pain, take it as prescribed. ? If you are not taking a prescription pain medicine, ask your doctor if acetaminophen (Tylenol) is safe for you. Also, ask how much you can safely take. ? Do not take aspirin, ibuprofen (Advil, Motrin), naproxen (Aleve), or other nonsteroidal anti-inflammatory drugs (NSAIDs) unless your doctor says it is okay.     · If you think your pain medicine is making you sick to your stomach:  ? Take your medicine after meals (unless your doctor has told you not to). ?  Ask your doctor for a different pain medicine.     · If your doctor prescribed antibiotics, take them as directed. Do not stop taking them just because you feel better. You need to take the full course of antibiotics. Care of the puncture site    · Keep a bandage over the puncture site for the first 2 to 3 days, or until your doctor says you can take it off.     · After the doctor says it is okay to take off the bandage, wash the area daily with warm water and pat it dry. Don't use hydrogen peroxide or alcohol, which can slow healing. You may cover the area with a gauze bandage if it weeps or rubs against clothing. Change the bandage every day.     · Keep the area clean and dry.     · Put ice or a cold pack on the area for 10 to 20 minutes at a time to help with soreness or swelling. You can do this two times a day. Put a thin cloth between the ice and your skin. Follow-up care is a key part of your treatment and safety. Be sure to make and go to all appointments, and call your doctor if you are having problems. It's also a good idea to know your test results and keep a list of the medicines you take. When should you call for help? Call 911 anytime you think you may need emergency care. For example, call if:    · You passed out (lost consciousness).     · You are short of breath. Call your doctor now or seek immediate medical care if:    · You have loose stitches, or your incision comes open.     · You are bleeding from the area where the catheter was put in your artery.     · You have a fast-growing, painful lump at the catheter site.     · Your leg or arm looks blue or feels cold, numb, or tingly.     · Bright red blood has soaked through the bandage over your incision.     · You have signs of a blood clot in your leg (called a deep vein thrombosis), such as:  ? Pain in your calf, back of the knee, thigh, or groin. ?  Redness or swelling in your leg.     · You have signs of infection, such as:  ? Increased pain, swelling, warmth, or redness near the puncture site. ? Red streaks leading from the puncture site. ? Pus draining from the puncture site. ? A fever.     · You have pain that does not get better after you take pain medicine.     · You are sick to your stomach or cannot drink fluids.     · You cannot pass stools or gas. Watch closely for changes in your health, and be sure to contact your doctor if you have any problems. Current as of: September 8, 2021               Content Version: 13.2  © 2006-2022 [employer]. Care instructions adapted under license by [employer] (which disclaims liability or warranty for this information). If you have questions about a medical condition or this instruction, always ask your healthcare professional. [place] [address] any warranty or liability for your use of this information.

## 2022-04-18 NOTE — PROGRESS NOTES
TRANSFER - OUT REPORT:    Verbal report given to Doroteo Mcqueen RN(name) on [facility]  being transferred to [facility](unit) for routine post - op       Report consisted of patients Situation, Background, Assessment and   Recommendations(SBAR). Information from the following report(s) SBAR, Kardex, Procedure Summary and MAR was reviewed with the receiving nurse. Lines:   Peripheral IV 04/18/22 Anterior;Right Forearm (Active)   Site Assessment Clean, dry, & intact 04/18/22 1200   Dressing Status Clean, dry, & intact 04/18/22 1200        Opportunity for questions and clarification was provided.       Patient transported with:   Registered Nurse

## 2022-04-18 NOTE — ROUTINE PROCESS
Pt ambulated from waiting area w/o difficulty, placed on monitor #1. PIV and prep completed, condom cath in place. Blood sent to lab. Screening questions answered, pt c/o 2/10 pain to LUQ, tolerable level 4/10.

## 2022-04-18 NOTE — H&P
History and Physical    Patient: Ayse Woods           Sex: male       DOA: 4/18/2022  YOB: 1965      Age:  60 y.o.     LOS:  LOS: 0 days        HPI:     Ayse Woods is a 60 y.o. male who has cholangiocarinoma in the liver, here for a Y90 work up with moderate sedation. Past Medical History:   Diagnosis Date    Arthritis     Chronic pain     joints due to Rheumatoid Arthritis    Contact dermatitis and other eczema, due to unspecified cause     dry skin in scalp    Depression     Gout     Headache(784.0)     Hiccups     Hiccups     Hypertension     Rheumatoid arthritis(714.0) 1983    Rheumatoid arthritis(714.0)     Seasonal allergic rhinitis        Past Surgical History:   Procedure Laterality Date    HX CYST REMOVAL  1983    large cyst removed on left shoulder       Family History   Problem Relation Age of Onset    Emphysema Mother    Rosalene Fortino Arthritis-rheumatoid Mother     Emphysema Father    Rosalene Fortino Arthritis-rheumatoid Father     Cancer Sister 35        breast    Liver Disease Brother     Thyroid Disease Brother        Social History     Socioeconomic History    Marital status:    Tobacco Use    Smoking status: Current Every Day Smoker     Packs/day: 1.00     Years: 25.00     Pack years: 25.00     Types: Cigarettes    Smokeless tobacco: Never Used   Substance and Sexual Activity    Alcohol use: Yes     Comment: occasionally    Drug use: Not Currently     Types: OTC, Prescription    Sexual activity: Yes     Partners: Female       Prior to Admission medications    Medication Sig Start Date End Date Taking? Authorizing Provider   sildenafil citrate (VIAGRA) 100 mg tablet Take 1 Tablet by mouth daily as needed for Erectile Dysfunction. 2/1/22   Claudine Brandt MD   allopurinoL (ZYLOPRIM) 300 mg tablet Take 1 Tablet by mouth daily. Patient not taking: Reported on 3/4/2022 1/27/22   Connie Hannah NP   cetirizine (ZYRTEC) 10 mg tablet Take 1 Tablet by mouth daily. 1/27/22   Samreen Hannah NP   fluticasone propionate (FLONASE) 50 mcg/actuation nasal spray 2 Sprays by Both Nostrils route daily. 1/27/22   Jimmy Hannah NP   ferrous sulfate 325 mg (65 mg iron) tablet Take 1 tablet by mouth daily 1/27/22   Samreen Hannah NP   baclofen 5 mg tab TAKE ONE TABLET BY MOUTH THREE TIMES A DAY AS NEEDED FOR HICCUPS  Patient not taking: Reported on 3/4/2022 1/11/22   Rosalba aHnnah NP   famotidine (PEPCID) 40 mg tablet TAKE ONE TABLET BY MOUTH DAILY 1/11/22   Toya Talbert MD   sildenafil citrate (VIAGRA) 100 mg tablet TAKE ONE TABLET BY MOUTH ONE HOUR PRIOR TO SEXUAL ACTIVITY   ( MAXIMUM DAILY DOSE 100 MG) 12/7/21   Cynthia Fine MD   celecoxib (CELEBREX) 200 mg capsule TAKE ONE CAPSULE BY MOUTH DAILY 11/7/21   Toya Talbert MD   indomethacin (INDOCIN) 50 mg capsule  9/16/21   Provider, Historical   amLODIPine (NORVASC) 10 mg tablet Take 1 Tablet by mouth daily. Indications: high blood pressure 10/20/21   Samreen Hannah NP   hydroCHLOROthiazide (HYDRODIURIL) 25 mg tablet Take 1 Tablet by mouth daily. 10/20/21   Rosalba Hannah NP   FeroSuL 325 mg (65 mg iron) tablet Take one tablet by mouth daily 10/20/21   Samreen Hannah NP   colchicine 0.6 mg tablet Take 1 Tablet by mouth daily. 7/29/21   Rosalba Hannah NP   doxycycline (VIBRAMYCIN) 100 mg capsule  4/29/21   Provider, Historical   doxycycline (MONODOX) 100 mg capsule Take 100 mg by mouth two (2) times a day. Provider, Historical       Allergies   Allergen Reactions    Lisinopril Anaphylaxis     Patient experienced throat swelling and respiratory distress as a result. Physical Exam:      There were no vitals taken for this visit. Physical Exam:  Mallampati 2 ASA 3  General: A&O x 4, NAD  Heart: RRR  Lungs: Normal work of breathing on room air    Labs Reviewed: All lab results for the last 24 hours reviewed.     Assessment/Plan The patient is an appropriate candidate to undergo the planned procedure and sedation    [place] Signs, PA

## 2022-04-27 ENCOUNTER — OFFICE VISIT (OUTPATIENT)
Dept: FAMILY MEDICINE CLINIC | Age: 57
End: 2022-04-27
Payer: MEDICAID

## 2022-04-27 VITALS
BODY MASS INDEX: 25.18 KG/M2 | DIASTOLIC BLOOD PRESSURE: 78 MMHG | HEART RATE: 67 BPM | RESPIRATION RATE: 18 BRPM | OXYGEN SATURATION: 100 % | WEIGHT: 190 LBS | HEIGHT: 73 IN | SYSTOLIC BLOOD PRESSURE: 134 MMHG | TEMPERATURE: 98.1 F

## 2022-04-27 DIAGNOSIS — J32.9 CHRONIC SINUSITIS, UNSPECIFIED LOCATION: ICD-10-CM

## 2022-04-27 DIAGNOSIS — K21.9 GASTROESOPHAGEAL REFLUX DISEASE, UNSPECIFIED WHETHER ESOPHAGITIS PRESENT: ICD-10-CM

## 2022-04-27 DIAGNOSIS — I10 BENIGN ESSENTIAL HYPERTENSION WITH TARGET BLOOD PRESSURE BELOW 140/90: ICD-10-CM

## 2022-04-27 DIAGNOSIS — I10 PRIMARY HYPERTENSION: ICD-10-CM

## 2022-04-27 DIAGNOSIS — J30.1 SEASONAL ALLERGIC RHINITIS DUE TO POLLEN: ICD-10-CM

## 2022-04-27 DIAGNOSIS — Z76.0 MEDICATION REFILL: ICD-10-CM

## 2022-04-27 DIAGNOSIS — M10.00 IDIOPATHIC GOUT, UNSPECIFIED CHRONICITY, UNSPECIFIED SITE: ICD-10-CM

## 2022-04-27 DIAGNOSIS — J30.89 ENVIRONMENTAL AND SEASONAL ALLERGIES: ICD-10-CM

## 2022-04-27 PROCEDURE — 99214 OFFICE O/P EST MOD 30 MIN: CPT | Performed by: FAMILY MEDICINE

## 2022-04-27 RX ORDER — FAMOTIDINE 40 MG/1
40 TABLET, FILM COATED ORAL DAILY
Qty: 90 TABLET | Refills: 0 | Status: SHIPPED | OUTPATIENT
Start: 2022-04-27 | End: 2022-07-26

## 2022-04-27 RX ORDER — LANOLIN ALCOHOL/MO/W.PET/CERES
CREAM (GRAM) TOPICAL
Qty: 90 TABLET | Refills: 0 | Status: SHIPPED | OUTPATIENT
Start: 2022-04-27 | End: 2022-06-15

## 2022-04-27 RX ORDER — MONTELUKAST SODIUM 10 MG/1
10 TABLET ORAL DAILY
Qty: 90 TABLET | Refills: 0 | Status: SHIPPED | OUTPATIENT
Start: 2022-04-27 | End: 2022-06-15

## 2022-04-27 RX ORDER — PANTOPRAZOLE SODIUM 40 MG/1
TABLET, DELAYED RELEASE ORAL
COMMUNITY
Start: 2022-04-24 | End: 2022-04-27 | Stop reason: SDUPTHER

## 2022-04-27 RX ORDER — PANTOPRAZOLE SODIUM 40 MG/1
40 TABLET, DELAYED RELEASE ORAL DAILY
Qty: 90 TABLET | Refills: 0 | Status: SHIPPED | OUTPATIENT
Start: 2022-04-27 | End: 2022-07-26

## 2022-04-27 RX ORDER — TRAMADOL HYDROCHLORIDE 50 MG/1
50 TABLET ORAL
COMMUNITY
End: 2022-06-15

## 2022-04-27 RX ORDER — AMLODIPINE BESYLATE 10 MG/1
10 TABLET ORAL DAILY
Qty: 90 TABLET | Refills: 1 | Status: SHIPPED | OUTPATIENT
Start: 2022-04-27

## 2022-04-27 RX ORDER — FLUTICASONE PROPIONATE 50 MCG
2 SPRAY, SUSPENSION (ML) NASAL DAILY
Qty: 1 EACH | Refills: 4 | Status: SHIPPED | OUTPATIENT
Start: 2022-04-27 | End: 2022-06-15

## 2022-04-27 RX ORDER — HYDRALAZINE HYDROCHLORIDE 25 MG/1
25 TABLET, FILM COATED ORAL 2 TIMES DAILY
Qty: 120 TABLET | Refills: 0 | Status: SHIPPED | OUTPATIENT
Start: 2022-04-27 | End: 2022-06-24

## 2022-04-27 RX ORDER — ALLOPURINOL 300 MG/1
300 TABLET ORAL DAILY
Qty: 90 TABLET | Refills: 0 | Status: SHIPPED | OUTPATIENT
Start: 2022-04-27

## 2022-04-27 RX ORDER — LEVOCETIRIZINE DIHYDROCHLORIDE 5 MG/1
5 TABLET, FILM COATED ORAL DAILY
Qty: 90 TABLET | Refills: 0 | Status: SHIPPED | OUTPATIENT
Start: 2022-04-27 | End: 2022-07-26

## 2022-04-27 RX ORDER — PREDNISONE 20 MG/1
20 TABLET ORAL
Qty: 4 TABLET | Refills: 0 | Status: SHIPPED | OUTPATIENT
Start: 2022-04-27 | End: 2022-05-01

## 2022-04-27 RX ORDER — CELECOXIB 200 MG/1
200 CAPSULE ORAL DAILY
Qty: 90 CAPSULE | Refills: 0 | Status: CANCELLED | OUTPATIENT
Start: 2022-04-27 | End: 2022-07-26

## 2022-04-27 NOTE — PATIENT INSTRUCTIONS
Gout: Care Instructions  Overview     Gout is a form of arthritis caused by a buildup of uric acid crystals in a joint. It causes sudden attacks of pain, swelling, redness, and stiffness, usually in one joint, especially the big toe. Gout usually comes on without a cause. But it can be brought on by drinking alcohol (especially beer), eating or drinking things made with high-fructose corn syrup, or eating seafood or red meat. Taking certain medicines, such as diuretics, can also trigger an attack of gout. Taking your medicines as prescribed and following up with your doctor regularly can help you avoid gout attacks in the future. Follow-up care is a key part of your treatment and safety. Be sure to make and go to all appointments, and call your doctor if you are having problems. It's also a good idea to know your test results and keep a list of the medicines you take. How can you care for yourself at home? · If the joint is swollen, put ice or a cold pack on the area for 10 to 20 minutes at a time. Put a thin cloth between the ice and your skin. · Prop up the sore limb on a pillow when you ice it or anytime you sit or lie down during the next 3 days. Try to keep it above the level of your heart. This can help reduce swelling. · Rest sore joints. Avoid activities that put weight or strain on the joints for a few days. Take short rest breaks from your regular activities during the day. · Take your medicines exactly as prescribed. Call your doctor if you think you are having a problem with your medicine. · Take pain medicines exactly as directed. ? If the doctor gave you a prescription medicine for pain, take it as prescribed. ? If you are not taking a prescription pain medicine, ask your doctor if you can take an over-the-counter medicine. · Eat less seafood and red meat. · Avoid foods or drinks that are made with high-fructose corn syrup. · Check with your doctor before drinking alcohol.   · Losing weight, if you are overweight, may help reduce attacks of gout. But do not go on a diet that causes rapid weight loss. Losing a lot of weight in a short amount of time can cause a gout attack. When should you call for help? Call your doctor now or seek immediate medical care if:    · You have a fever.     · The joint is so painful you cannot use it.     · You have sudden, unexplained swelling, redness, warmth, or severe pain in one or more joints. Watch closely for changes in your health, and be sure to contact your doctor if:    · You have joint pain.     · Your symptoms get worse or are not improving after 2 or 3 days. Where can you learn more? Go to http://www.gray.com/  Enter E531 in the search box to learn more about \"Gout: Care Instructions. \"  Current as of: December 20, 2021               Content Version: 13.2  © 8869-3573 "[employer] Care instructions adapted under license by [employer] (which disclaims liability or warranty for this information). If you have questions about a medical condition or this instruction, always ask your healthcare professional. [address] any warranty or liability for your use of this information.

## 2022-04-27 NOTE — PROGRESS NOTES
HPI  This is a 49-year-old -American male with past medical history significant for hypertension, cervical spondylosis without myelopathy, degenerative disc disease, rheumatoid arthritis involving multiple joints, depression, hyperuricemia, anxiety, BPH/ED (follows up with Dr. Nuha Montana, cholangiocarcinoma (follows ups up with Dr. Valery Juarez) who is here to follow-up on chronic conditions. Chronic gout  Patient states that he continues to have multiple flareups of his gout in the right ankle. States that it swollen and painful. Patient is currently on hydrochlorothiazide which can induce hyperuricemia we will discontinue hydrochlorothiazide. I will give patient prednisone for the next 4 days. Advised patient to continue allopurinol 300 mg daily. Advised to avoid meat and seafood along with alcohol. Hypertension  Patient's blood pressure is well controlled today. Due to history of gout we will discontinue hydrochlorothiazide which can cause gout flareups and place him on hydralazine 25 mg twice daily. Advised patient to keep log of blood pressure and return in case we need to adjust his medications. Denies any headaches, blurred vision, chest pain, shortness of breath, palpitations, swelling in legs, claudication. Advised patient to continue amlodipine 10 mg daily. Seasonal Allergies/ Sinusitis  Patient states that continues to have congestion and runny nose along with itchy eyes and sneezing now that the pollen is out. He states that his Zyrtec and is not helping him. We will switch to Xyzal and start patient on montelukast 10 mg daily along with continuing Flonase. GERD  Patient is currently on famotidine and pantoprazole which she needs a refill on. Iron deficiency anemia  Patient is on ferrous sulfate which she needs a refill on last CBC showed that his hemoglobin was 11.5.     Past Medical History  Past Medical History:   Diagnosis Date    Arthritis     Chronic pain joints due to Rheumatoid Arthritis    Contact dermatitis and other eczema, due to unspecified cause     dry skin in scalp    Depression     GERD (gastroesophageal reflux disease)     Gout     Headache(784.0)     Hiccups     Hiccups     Hypertension     Liver disease     Rheumatoid arthritis(714.0) 1983    Rheumatoid arthritis(714.0)     Seasonal allergic rhinitis        Surgical History  Past Surgical History:   Procedure Laterality Date    HX CYST REMOVAL  1983    large cyst removed on left shoulder    IR OCCL TXCATH ARTERY NON HEMMORAGE W SI  4/18/2022        Medications  Current Outpatient Medications   Medication Sig Dispense Refill    allopurinoL (ZYLOPRIM) 300 mg tablet Take 1 Tablet by mouth daily. 90 Tablet 0    famotidine (PEPCID) 40 mg tablet Take 1 Tablet by mouth daily for 90 days. 90 Tablet 0    amLODIPine (NORVASC) 10 mg tablet Take 1 Tablet by mouth daily. Indications: high blood pressure 90 Tablet 1    levocetirizine (XYZAL) 5 mg tablet Take 1 Tablet by mouth daily for 90 days. 90 Tablet 0    montelukast (SINGULAIR) 10 mg tablet Take 1 Tablet by mouth daily for 90 days. 90 Tablet 0    traMADoL (ULTRAM) 50 mg tablet Take 50 mg by mouth every six (6) hours as needed.  pantoprazole (PROTONIX) 40 mg tablet Take 1 Tablet by mouth daily for 90 days. 90 Tablet 0    hydrALAZINE (APRESOLINE) 25 mg tablet Take 1 Tablet by mouth two (2) times a day for 60 days. 120 Tablet 0    ferrous sulfate 325 mg (65 mg iron) tablet Take 1 tablet by mouth daily 90 Tablet 0    predniSONE (DELTASONE) 20 mg tablet Take 20 mg by mouth daily (with breakfast) for 4 days. 4 Tablet 0    fluticasone propionate (FLONASE) 50 mcg/actuation nasal spray 2 Sprays by Both Nostrils route daily. 1 Each 4    sildenafil citrate (VIAGRA) 100 mg tablet Take 1 Tablet by mouth daily as needed for Erectile Dysfunction.  30 Tablet 3    baclofen 5 mg tab TAKE ONE TABLET BY MOUTH THREE TIMES A DAY AS NEEDED FOR HICCUPS 90 Tablet 0    sildenafil citrate (VIAGRA) 100 mg tablet TAKE ONE TABLET BY MOUTH ONE HOUR PRIOR TO SEXUAL ACTIVITY   ( MAXIMUM DAILY DOSE 100 MG) 30 Tablet 2    FeroSuL 325 mg (65 mg iron) tablet Take one tablet by mouth daily 90 Tablet 1    colchicine 0.6 mg tablet Take 1 Tablet by mouth daily. 90 Tablet 1    doxycycline (VIBRAMYCIN) 100 mg capsule       doxycycline (MONODOX) 100 mg capsule Take 100 mg by mouth two (2) times a day. Allergies  Allergies   Allergen Reactions    Lisinopril Anaphylaxis     Patient experienced throat swelling and respiratory distress as a result. Family History  Family History   Problem Relation Age of Onset    Emphysema Mother    Galo Arthritis-rheumatoid Mother     Emphysema Father     Arthritis-rheumatoid Father     Cancer Sister 35        breast    Liver Disease Brother     Thyroid Disease Brother        Social History  Social History     Socioeconomic History    Marital status:      Spouse name: Not on file    Number of children: Not on file    Years of education: Not on file    Highest education level: Not on file   Occupational History    Not on file   Tobacco Use    Smoking status: Current Every Day Smoker     Packs/day: 1.00     Years: 25.00     Pack years: 25.00     Types: Cigarettes    Smokeless tobacco: Never Used   Vaping Use    Vaping Use: Not on file   Substance and Sexual Activity    Alcohol use: Yes     Comment: occasionally    Drug use: Not Currently     Types: OTC, Prescription    Sexual activity: Yes     Partners: Female   Other Topics Concern    Not on file   Social History Narrative    Not on file     Social Determinants of Health     Financial Resource Strain:     Difficulty of Paying Living Expenses: Not on file   Food Insecurity:     Worried About Running Out of Food in the Last Year: Not on file    Chris of Food in the Last Year: Not on file   Transportation Needs:     Lack of Transportation (Medical):  Not on file    Lack of Transportation (Non-Medical): Not on file   Physical Activity:     Days of Exercise per Week: Not on file    Minutes of Exercise per Session: Not on file   Stress:     Feeling of Stress : Not on file   Social Connections:     Frequency of Communication with Friends and Family: Not on file    Frequency of Social Gatherings with Friends and Family: Not on file    Attends [employer] Services: Not on file    Active Member of [address] [place] [address] or Organizations: Not on file    Attends Club or Organization Meetings: Not on file    Marital Status: Not on file   Intimate Partner Violence:     Fear of Current or Ex-Partner: Not on file    Emotionally Abused: Not on file    Physically Abused: Not on file    Sexually Abused: Not on file   Housing Stability:     Unable to Pay for Housing in the Last Year: Not on file    Number of [place] in the Last Year: Not on file    Unstable Housing in the Last Year: Not on file       Review of Systems  Review of Systems   Constitutional: Negative for chills and fever. Respiratory: Negative for cough and shortness of breath. Cardiovascular: Negative for chest pain and leg swelling. Gastrointestinal: Negative for abdominal pain, nausea and vomiting. Musculoskeletal: Positive for joint pain. Skin: Negative for rash. Neurological: Negative for dizziness and headaches. Vital Signs  Visit Vitals  /78 (BP 1 Location: Left upper arm, BP Patient Position: Sitting, BP Cuff Size: Large adult)   Pulse 67   Temp 98.1 °F (36.7 °C) (Temporal)   Resp 18   Ht 6' 1\" (1.854 m)   Wt 190 lb (86.2 kg)   SpO2 100%   BMI 25.07 kg/m²         Physical Exam  Physical Exam  Constitutional:       Appearance: Normal appearance. HENT:      Head: Normocephalic and atraumatic. Nose: Nose normal.   Eyes:      General: No scleral icterus. Conjunctiva/sclera: Conjunctivae normal.   Cardiovascular:      Rate and Rhythm: Normal rate and regular rhythm.       Heart sounds: Normal heart sounds. No murmur heard. No gallop. Pulmonary:      Effort: Pulmonary effort is normal. No respiratory distress. Breath sounds: No wheezing. Abdominal:      General: There is no distension. Palpations: Abdomen is soft. Musculoskeletal:         General: Swelling, tenderness and deformity present. Cervical back: Normal range of motion and neck supple. Comments: Swelling, tenderness, deformity of the right ankle and adjacent dorsum of the foot. Skin:     Findings: No erythema or rash. Neurological:      Mental Status: He is alert and oriented to person, place, and time. Mental status is at baseline. Results  Results for orders placed or performed during the hospital encounter of 45/43/66   METABOLIC PANEL, BASIC   Result Value Ref Range    Sodium 139 136 - 145 mmol/L    Potassium 3.9 3.5 - 5.5 mmol/L    Chloride 108 100 - 111 mmol/L    CO2 25 21 - 32 mmol/L    Anion gap 6 3.0 - 18 mmol/L    Glucose 100 (H) 74 - 99 mg/dL    BUN 13 7.0 - 18 MG/DL    Creatinine 0.88 0.6 - 1.3 MG/DL    BUN/Creatinine ratio 15 12 - 20      GFR est AA >60 >60 ml/min/1.73m2    GFR est non-AA >60 >60 ml/min/1.73m2    Calcium 9.5 8.5 - 10.1 MG/DL   CBC WITH AUTOMATED DIFF   Result Value Ref Range    WBC 7.0 4.6 - 13.2 K/uL    RBC 4.20 (L) 4.35 - 5.65 M/uL    HGB 11.5 (L) 13.0 - 16.0 g/dL    HCT 33.8 (L) 36.0 - 48.0 %    MCV 80.5 78.0 - 100.0 FL    MCH 27.4 24.0 - 34.0 PG    MCHC 34.0 31.0 - 37.0 g/dL    RDW 15.2 (H) 11.6 - 14.5 %    PLATELET 911 922 - 616 K/uL    MPV 12.4 (H) 9.2 - 11.8 FL    NRBC 0.0 0  WBC    ABSOLUTE NRBC 0.00 0.00 - 0.01 K/uL    NEUTROPHILS 57 40 - 73 %    LYMPHOCYTES 28 21 - 52 %    MONOCYTES 13 (H) 3 - 10 %    EOSINOPHILS 1 0 - 5 %    BASOPHILS 1 0 - 2 %    IMMATURE GRANULOCYTES 0 0.0 - 0.5 %    ABS. NEUTROPHILS 4.0 1.8 - 8.0 K/UL    ABS. LYMPHOCYTES 2.0 0.9 - 3.6 K/UL    ABS. MONOCYTES 0.9 0.05 - 1.2 K/UL    ABS.  EOSINOPHILS 0.0 0.0 - 0.4 K/UL ABS. BASOPHILS 0.1 0.0 - 0.1 K/UL    ABS. IMM. GRANS. 0.0 0.00 - 0.04 K/UL    DF AUTOMATED     PROTHROMBIN TIME + INR   Result Value Ref Range    Prothrombin time 12.7 11.5 - 15.2 sec    INR 1.0 0.8 - 1.2     PTT   Result Value Ref Range    aPTT 34.6 23.0 - 36.4 SEC   HEPATIC FUNCTION PANEL   Result Value Ref Range    Protein, total 7.4 6.4 - 8.2 g/dL    Albumin 4.1 3.4 - 5.0 g/dL    Globulin 3.3 2.0 - 4.0 g/dL    A-G Ratio 1.2 0.8 - 1.7      Bilirubin, total 0.5 0.2 - 1.0 MG/DL    Bilirubin, direct 0.2 0.0 - 0.2 MG/DL    Alk. phosphatase 87 45 - 117 U/L    AST (SGOT) 30 10 - 38 U/L    ALT (SGPT) 25 16 - 61 U/L       ASSESSMENT and PLAN  1. Primary hypertension/ 2. Benign essential hypertension with target blood pressure below 140/90  - hydrALAZINE (APRESOLINE) 25 mg tablet; Take 1 Tablet by mouth two (2) times a day for 60 days. Dispense: 120 Tablet; Refill: 0  -medication regimen: Continue amlodipine 10 mg and start hydralazine 25 mg twice daily. Discontinue hydrochlorothiazide due to history of gout and recurrent flareups. -counseled about diet rich in green leafy vegetables/protein, decrease intake of red meat/sodium/and cholesterol.   -counseled about 150 min of moderate intensity exercise a week. Alternating between cardio and strength training.   -counseled about medication adherence and weight loss  -counseled to keep log of blood pressure at home and bring to next appointment   -Return visit:6-8 weeks for HTN follow. Advised to establish care with new PCP in the next 3 months as I am leaving July 1. List of providers in the area accepting new patients given to patient today. - amLODIPine (NORVASC) 10 mg tablet; Take 1 Tablet by mouth daily. Indications: high blood pressure  Dispense: 90 Tablet; Refill: 1    3. Idiopathic gout, unspecified chronicity, unspecified site  -Stopped hydrochlorothiazide  - allopurinoL (ZYLOPRIM) 300 mg tablet; Take 1 Tablet by mouth daily. Dispense: 90 Tablet;  Refill: 0  - predniSONE (DELTASONE) 20 mg tablet; Take 20 mg by mouth daily (with breakfast) for 4 days. Dispense: 4 Tablet; Refill: 0  -advised to stop taking colchicine    4. Medication refill  - allopurinoL (ZYLOPRIM) 300 mg tablet; Take 1 Tablet by mouth daily. Dispense: 90 Tablet; Refill: 0  - amLODIPine (NORVASC) 10 mg tablet; Take 1 Tablet by mouth daily. Indications: high blood pressure  Dispense: 90 Tablet; Refill: 1  - ferrous sulfate 325 mg (65 mg iron) tablet; Take 1 tablet by mouth daily  Dispense: 90 Tablet; Refill: 0  - fluticasone propionate (FLONASE) 50 mcg/actuation nasal spray; 2 Sprays by Both Nostrils route daily. Dispense: 1 Each; Refill: 4    5. Chronic sinusitis, unspecified location  - montelukast (SINGULAIR) 10 mg tablet; Take 1 Tablet by mouth daily for 90 days. Dispense: 90 Tablet; Refill: 0    6. Seasonal allergic rhinitis due to pollen  - fluticasone propionate (FLONASE) 50 mcg/actuation nasal spray; 2 Sprays by Both Nostrils route daily. Dispense: 1 Each; Refill: 4    7. Environmental and seasonal allergies  - levocetirizine (XYZAL) 5 mg tablet; Take 1 Tablet by mouth daily for 90 days. Dispense: 90 Tablet; Refill: 0    8. Gastroesophageal reflux disease, unspecified whether esophagitis present  - famotidine (PEPCID) 40 mg tablet; Take 1 Tablet by mouth daily for 90 days. Dispense: 90 Tablet; Refill: 0  - pantoprazole (PROTONIX) 40 mg tablet; Take 1 Tablet by mouth daily for 90 days. Dispense: 90 Tablet; Refill: 0           Follow-up and Dispositions    · Return in about 7 weeks (around 6/14/2022), or if symptoms worsen or fail to improve, for follow up HTN. I have discussed the diagnosis with the patient and the intended plan of care as seen in the above orders. The patient has received an after-visit summary and questions were answered concerning future plans. I have discussed medication, side effects, and warnings with the patient in detail.  The patient verbalized understanding and is in agreement with the plan of care. The patient will contact the office with any additional concerns. I spent at least 30 minutes on this visit with this established patient. Chris Raza MD    PLEASE NOTE:   This document has been produced using voice recognition software.  Unrecognized errors in transcription may be present

## 2022-04-27 NOTE — PROGRESS NOTES
Chief Complaint   Patient presents with    Follow Up Chronic Condition    Gout    Sinus Infection     1. \"Have you been to the ER, urgent care clinic since your last visit? Hospitalized since your last visit? \" No    2. \"Have you seen or consulted any other health care providers outside of the [address] [place] [address] since your last visit? \" No     3. For patients aged 39-70: Has the patient had a colonoscopy / FIT/ Cologuard? Yes - no Care Gap present      If the patient is female:    4. For patients aged 41-77: Has the patient had a mammogram within the past 2 years? NA - based on age or sex      11. For patients aged 21-65: Has the patient had a pap smear?  NA - based on age or sex

## 2022-05-03 ENCOUNTER — HOSPITAL ENCOUNTER (OUTPATIENT)
Dept: INTERVENTIONAL RADIOLOGY/VASCULAR | Age: 57
Discharge: HOME | End: 2022-05-03
Attending: RADIOLOGY | Admitting: RADIOLOGY
Payer: MEDICAID

## 2022-05-03 ENCOUNTER — APPOINTMENT (OUTPATIENT)
Dept: NUCLEAR MEDICINE | Age: 57
End: 2022-05-03
Attending: RADIOLOGY
Payer: MEDICAID

## 2022-05-03 VITALS
BODY MASS INDEX: 26.01 KG/M2 | OXYGEN SATURATION: 100 % | DIASTOLIC BLOOD PRESSURE: 80 MMHG | SYSTOLIC BLOOD PRESSURE: 140 MMHG | HEART RATE: 54 BPM | WEIGHT: 192 LBS | HEIGHT: 72 IN | RESPIRATION RATE: 8 BRPM

## 2022-05-03 DIAGNOSIS — C22.0 HEPATOCELLULAR CARCINOMA (HCC): ICD-10-CM

## 2022-05-03 LAB
ALBUMIN SERPL-MCNC: 3.7 G/DL (ref 3.4–5)
ALBUMIN/GLOB SERPL: 1.2 {RATIO} (ref 0.8–1.7)
ALP SERPL-CCNC: 71 U/L (ref 45–117)
ALT SERPL-CCNC: 18 U/L (ref 16–61)
ANION GAP SERPL CALC-SCNC: 4 MMOL/L (ref 3–18)
APTT PPP: 28.9 SEC (ref 23–36.4)
AST SERPL-CCNC: 15 U/L (ref 10–38)
BILIRUB SERPL-MCNC: 0.4 MG/DL (ref 0.2–1)
BUN SERPL-MCNC: 14 MG/DL (ref 7–18)
BUN/CREAT SERPL: 14 (ref 12–20)
CALCIUM SERPL-MCNC: 9.4 MG/DL (ref 8.5–10.1)
CHLORIDE SERPL-SCNC: 111 MMOL/L (ref 100–111)
CO2 SERPL-SCNC: 27 MMOL/L (ref 21–32)
CREAT SERPL-MCNC: 1 MG/DL (ref 0.6–1.3)
ERYTHROCYTE [DISTWIDTH] IN BLOOD BY AUTOMATED COUNT: 15.8 % (ref 11.6–14.5)
GLOBULIN SER CALC-MCNC: 3.2 G/DL (ref 2–4)
GLUCOSE SERPL-MCNC: 96 MG/DL (ref 74–99)
HCT VFR BLD AUTO: 31.4 % (ref 36–48)
HGB BLD-MCNC: 10.5 G/DL (ref 13–16)
INR PPP: 1 (ref 0.8–1.2)
MCH RBC QN AUTO: 26.6 PG (ref 24–34)
MCHC RBC AUTO-ENTMCNC: 33.4 G/DL (ref 31–37)
MCV RBC AUTO: 79.5 FL (ref 78–100)
NRBC # BLD: 0 K/UL (ref 0–0.01)
NRBC BLD-RTO: 0 PER 100 WBC
PLATELET # BLD AUTO: 186 K/UL (ref 135–420)
PMV BLD AUTO: 11.6 FL (ref 9.2–11.8)
POTASSIUM SERPL-SCNC: 3.3 MMOL/L (ref 3.5–5.5)
PROT SERPL-MCNC: 6.9 G/DL (ref 6.4–8.2)
PROTHROMBIN TIME: 13.4 SEC (ref 11.5–15.2)
RBC # BLD AUTO: 3.95 M/UL (ref 4.35–5.65)
SODIUM SERPL-SCNC: 142 MMOL/L (ref 136–145)
WBC # BLD AUTO: 9.3 K/UL (ref 4.6–13.2)

## 2022-05-03 PROCEDURE — C1769 GUIDE WIRE: HCPCS

## 2022-05-03 PROCEDURE — 74011000636 HC RX REV CODE- 636: Performed by: RADIOLOGY

## 2022-05-03 PROCEDURE — 37243 VASC EMBOLIZE/OCCLUDE ORGAN: CPT

## 2022-05-03 PROCEDURE — 85027 COMPLETE CBC AUTOMATED: CPT

## 2022-05-03 PROCEDURE — 74011250636 HC RX REV CODE- 250/636: Performed by: RADIOLOGY

## 2022-05-03 PROCEDURE — 74011000258 HC RX REV CODE- 258: Performed by: RADIOLOGY

## 2022-05-03 PROCEDURE — C1760 CLOSURE DEV, VASC: HCPCS

## 2022-05-03 PROCEDURE — 74011000250 HC RX REV CODE- 250: Performed by: RADIOLOGY

## 2022-05-03 PROCEDURE — 80053 COMPREHEN METABOLIC PANEL: CPT

## 2022-05-03 PROCEDURE — 85610 PROTHROMBIN TIME: CPT

## 2022-05-03 PROCEDURE — 76937 US GUIDE VASCULAR ACCESS: CPT

## 2022-05-03 PROCEDURE — C9113 INJ PANTOPRAZOLE SODIUM, VIA: HCPCS | Performed by: RADIOLOGY

## 2022-05-03 PROCEDURE — 77030004561 HC CATH ANGI DX COBRA ANGI -B

## 2022-05-03 PROCEDURE — C2616 BRACHYTX, NON-STR,YTTRIUM-90: HCPCS

## 2022-05-03 PROCEDURE — 85730 THROMBOPLASTIN TIME PARTIAL: CPT

## 2022-05-03 RX ORDER — LIDOCAINE HYDROCHLORIDE 10 MG/ML
20 INJECTION, SOLUTION EPIDURAL; INFILTRATION; INTRACAUDAL; PERINEURAL ONCE
Status: CANCELLED | OUTPATIENT
Start: 2022-05-03 | End: 2022-05-03

## 2022-05-03 RX ORDER — SODIUM CHLORIDE 9 MG/ML
20 INJECTION, SOLUTION INTRAVENOUS CONTINUOUS
Status: DISCONTINUED | OUTPATIENT
Start: 2022-05-03 | End: 2022-05-04 | Stop reason: HOSPADM

## 2022-05-03 RX ORDER — IODIXANOL 320 MG/ML
200 INJECTION, SOLUTION INTRAVASCULAR
Status: COMPLETED | OUTPATIENT
Start: 2022-05-03 | End: 2022-05-03

## 2022-05-03 RX ORDER — DIPHENHYDRAMINE HYDROCHLORIDE 50 MG/ML
25-50 INJECTION, SOLUTION INTRAMUSCULAR; INTRAVENOUS ONCE
Status: COMPLETED | OUTPATIENT
Start: 2022-05-03 | End: 2022-05-03

## 2022-05-03 RX ORDER — HEPARIN SODIUM 200 [USP'U]/100ML
INJECTION, SOLUTION INTRAVENOUS
Status: DISCONTINUED
Start: 2022-05-03 | End: 2022-05-04 | Stop reason: HOSPADM

## 2022-05-03 RX ORDER — MIDAZOLAM HYDROCHLORIDE 1 MG/ML
.5-2 INJECTION, SOLUTION INTRAMUSCULAR; INTRAVENOUS
Status: DISCONTINUED | OUTPATIENT
Start: 2022-05-03 | End: 2022-05-03 | Stop reason: ALTCHOICE

## 2022-05-03 RX ORDER — FENTANYL CITRATE 50 UG/ML
12.5-1 INJECTION, SOLUTION INTRAMUSCULAR; INTRAVENOUS
Status: DISCONTINUED | OUTPATIENT
Start: 2022-05-03 | End: 2022-05-03 | Stop reason: ALTCHOICE

## 2022-05-03 RX ORDER — LEVOFLOXACIN 500 MG/1
500 TABLET, FILM COATED ORAL DAILY
Qty: 10 TABLET | Refills: 0 | Status: SHIPPED | OUTPATIENT
Start: 2022-05-03 | End: 2022-05-13

## 2022-05-03 RX ORDER — ONDANSETRON 4 MG/1
4 TABLET, ORALLY DISINTEGRATING ORAL
Qty: 30 TABLET | Refills: 0 | Status: SHIPPED | OUTPATIENT
Start: 2022-05-03 | End: 2022-06-15

## 2022-05-03 RX ORDER — LIDOCAINE HYDROCHLORIDE 10 MG/ML
30 INJECTION, SOLUTION EPIDURAL; INFILTRATION; INTRACAUDAL; PERINEURAL ONCE
Status: COMPLETED | OUTPATIENT
Start: 2022-05-03 | End: 2022-05-03

## 2022-05-03 RX ORDER — HYDROCODONE BITARTRATE AND ACETAMINOPHEN 5; 325 MG/1; MG/1
1 TABLET ORAL
Qty: 12 TABLET | Refills: 0 | Status: SHIPPED | OUTPATIENT
Start: 2022-05-03 | End: 2022-05-06

## 2022-05-03 RX ORDER — ONDANSETRON 2 MG/ML
4 INJECTION INTRAMUSCULAR; INTRAVENOUS
Status: COMPLETED | OUTPATIENT
Start: 2022-05-03 | End: 2022-05-03

## 2022-05-03 RX ORDER — IODIXANOL 320 MG/ML
100-150 INJECTION, SOLUTION INTRAVASCULAR
Status: CANCELLED | OUTPATIENT
Start: 2022-05-03 | End: 2022-05-03

## 2022-05-03 RX ORDER — CIPROFLOXACIN 2 MG/ML
400 INJECTION, SOLUTION INTRAVENOUS ONCE
Status: COMPLETED | OUTPATIENT
Start: 2022-05-03 | End: 2022-05-03

## 2022-05-03 RX ADMIN — FENTANYL CITRATE 25 MCG: 50 INJECTION, SOLUTION INTRAMUSCULAR; INTRAVENOUS at 15:55

## 2022-05-03 RX ADMIN — DEXAMETHASONE SODIUM PHOSPHATE 20 MG: 4 INJECTION, SOLUTION INTRAMUSCULAR; INTRAVENOUS at 13:10

## 2022-05-03 RX ADMIN — CIPROFLOXACIN 400 MG: 2 INJECTION, SOLUTION INTRAVENOUS at 14:35

## 2022-05-03 RX ADMIN — MIDAZOLAM 0.5 MG: 1 INJECTION INTRAMUSCULAR; INTRAVENOUS at 15:55

## 2022-05-03 RX ADMIN — SODIUM CHLORIDE 20 ML/HR: 9 INJECTION, SOLUTION INTRAVENOUS at 14:35

## 2022-05-03 RX ADMIN — FENTANYL CITRATE 50 MCG: 50 INJECTION, SOLUTION INTRAMUSCULAR; INTRAVENOUS at 14:50

## 2022-05-03 RX ADMIN — MIDAZOLAM 1 MG: 1 INJECTION INTRAMUSCULAR; INTRAVENOUS at 14:50

## 2022-05-03 RX ADMIN — IODIXANOL 200 ML: 320 INJECTION, SOLUTION INTRAVASCULAR at 15:02

## 2022-05-03 RX ADMIN — DIPHENHYDRAMINE HYDROCHLORIDE 50 MG: 50 INJECTION, SOLUTION INTRAMUSCULAR; INTRAVENOUS at 14:55

## 2022-05-03 RX ADMIN — MIDAZOLAM 1 MG: 1 INJECTION INTRAMUSCULAR; INTRAVENOUS at 15:40

## 2022-05-03 RX ADMIN — SODIUM CHLORIDE 40 MG: 9 INJECTION, SOLUTION INTRAMUSCULAR; INTRAVENOUS; SUBCUTANEOUS at 13:09

## 2022-05-03 RX ADMIN — FENTANYL CITRATE 25 MCG: 50 INJECTION, SOLUTION INTRAMUSCULAR; INTRAVENOUS at 15:40

## 2022-05-03 RX ADMIN — LIDOCAINE HYDROCHLORIDE 30 ML: 10 INJECTION, SOLUTION EPIDURAL; INFILTRATION; INTRACAUDAL; PERINEURAL at 14:53

## 2022-05-03 RX ADMIN — FENTANYL CITRATE 50 MCG: 50 INJECTION, SOLUTION INTRAMUSCULAR; INTRAVENOUS at 15:00

## 2022-05-03 RX ADMIN — ONDANSETRON 4 MG: 2 INJECTION INTRAMUSCULAR; INTRAVENOUS at 13:08

## 2022-05-03 RX ADMIN — MIDAZOLAM 1 MG: 1 INJECTION INTRAMUSCULAR; INTRAVENOUS at 15:00

## 2022-05-03 NOTE — PROCEDURES
RADIOLOGY POST PROCEDURE NOTE     May 3, 2022       4:56 PM     Preoperative Diagnosis:   Cholangiocarcinoma. Postoperative Diagnosis:  Same. :  Dr. Poe     Assistant:  None. Type of Anesthesia: 1% plain lidocaine and IV moderate sedation. Procedure/Description: Image guided Y90 Radioembolization of liver cholangiocarcinoma. Findings:   No bleeding. Estimated blood Loss:  Minimal    Specimen Removed:   no    Blood transfusions:  None. Implants:  None.     Complications: None    Condition: Stable    Discharge Plan:  discharge home     Rosy Sampson MD

## 2022-05-03 NOTE — DISCHARGE INSTRUCTIONS
Patient Education        Tumor Embolization for Liver Cancer: What to Expect at [address] [facility]     Tumor embolization is a procedure to shrink a liver tumor by cutting off its blood supply. The doctor put a thin, flexible tube, called a catheter, into an artery near your groin or in your arm. The catheter was guided into the liver artery (the hepatic artery) that supplies blood to the tumor. The doctor sent small particles, chemotherapy, or tiny beads through the catheter into the hepatic artery. This stops blood flow to the tumor, causing it to slowly shrink. The tiny beads may contain chemotherapy or radiation to help kill the tumor cells. You may go home the same day. Or you might need to stay in the hospital overnight or longer. The area where the catheter was put through your skin into your artery (the puncture site) may be sore for a day or two after the procedure. You will probably have a bruise for at least a week. You may feel like you have the flu and may feel tired and have a low fever and an upset stomach. You may not feel as hungry as you usually do. This is common. These symptoms usually get better in 1 to 2 weeks. It may take a month or more to fully get your energy back. You will have tests in the months after the procedure to check the liver tumor and see how well the treatment worked. This care sheet gives you a general idea about how long it will take for you to recover. But each person recovers at a different pace. Follow the steps below to feel better as quickly as possible. How can you care for yourself at home? Activity    · Rest when you feel tired. Getting enough sleep will help you recover.     · Try to walk each day. Start by walking a little more than you did the day before. Bit by bit, increase the amount you walk. Walking boosts blood flow and helps prevent pneumonia and constipation.  If you feel unsteady, have someone walk with you.     · Try not to walk up stairs for the first couple of days.     · Avoid strenuous activities, such as bicycle riding, jogging, weight lifting, or aerobic exercise, for at least 2 days or until your doctor says it is okay.     · For 2 to 3 days, avoid lifting anything that would make you strain. This may include a child, heavy grocery bags and milk containers, a heavy briefcase or backpack, cat litter or dog food bags, or a vacuum .     · You may shower 48 hours after the procedure, if your doctor says it is okay. Tape a plastic bag over the puncture site when you shower. Do not take a bath for the first 5 days, or until your doctor tells you it is okay.     · Ask your doctor when you can drive again.     · It may take a month or more to fully get your energy back. Diet    · You can eat your normal diet. If your stomach is upset, try bland, low-fat foods like plain rice, broiled chicken, toast, and yogurt.     · Drink plenty of fluids (unless your doctor tells you not to). Medicines    · Your doctor will tell you if and when you can restart your medicines. You will also get instructions about taking any new medicines.     · If you take aspirin or some other blood thinner, ask your doctor if and when to start taking it again. Make sure that you understand exactly what your doctor wants you to do.     · Be safe with medicines. Take pain medicines exactly as directed. ? If the doctor gave you a prescription medicine for pain, take it as prescribed. ? If you are not taking a prescription pain medicine, ask your doctor if acetaminophen (Tylenol) is safe for you. Also, ask how much you can safely take. ? Do not take aspirin, ibuprofen (Advil, Motrin), naproxen (Aleve), or other nonsteroidal anti-inflammatory drugs (NSAIDs) unless your doctor says it is okay.     · If you think your pain medicine is making you sick to your stomach:  ? Take your medicine after meals (unless your doctor has told you not to). ?  Ask your doctor for a different pain medicine.     · If your doctor prescribed antibiotics, take them as directed. Do not stop taking them just because you feel better. You need to take the full course of antibiotics. Care of the puncture site    · Keep a bandage over the puncture site for the first 2 to 3 days, or until your doctor says you can take it off.     · After the doctor says it is okay to take off the bandage, wash the area daily with warm water and pat it dry. Don't use hydrogen peroxide or alcohol, which can slow healing. You may cover the area with a gauze bandage if it weeps or rubs against clothing. Change the bandage every day.     · Keep the area clean and dry.     · Put ice or a cold pack on the area for 10 to 20 minutes at a time to help with soreness or swelling. You can do this two times a day. Put a thin cloth between the ice and your skin. Follow-up care is a key part of your treatment and safety. Be sure to make and go to all appointments, and call your doctor if you are having problems. It's also a good idea to know your test results and keep a list of the medicines you take. When should you call for help? Call 911 anytime you think you may need emergency care. For example, call if:    · You passed out (lost consciousness).     · You are short of breath. Call your doctor now or seek immediate medical care if:    · You have loose stitches, or your incision comes open.     · You are bleeding from the area where the catheter was put in your artery.     · You have a fast-growing, painful lump at the catheter site.     · Your leg or arm looks blue or feels cold, numb, or tingly.     · Bright red blood has soaked through the bandage over your incision.     · You have signs of a blood clot in your leg (called a deep vein thrombosis), such as:  ? Pain in your calf, back of the knee, thigh, or groin. ?  Redness or swelling in your leg.     · You have signs of infection, such as:  ? Increased pain, swelling, warmth, or redness near the puncture site. ? Red streaks leading from the puncture site. ? Pus draining from the puncture site. ? A fever.     · You have pain that does not get better after you take pain medicine.     · You are sick to your stomach or cannot drink fluids.     · You cannot pass stools or gas. Watch closely for changes in your health, and be sure to contact your doctor if you have any problems. Current as of: September 8, 2021               Content Version: 13.2  © 2006-2022 [employer]. Care instructions adapted under license by [employer] (which disclaims liability or warranty for this information). If you have questions about a medical condition or this instruction, always ask your healthcare professional. [place] [address] any warranty or liability for your use of this information. DISCHARGE SUMMARY from Nurse: PATIENT INSTRUCTIONS:    After general anesthesia or intravenous sedation, for 24 hours or while taking prescription Narcotics:  · Limit your activities  · Do not drive and operate hazardous machinery  · Do not make important personal or business decisions  · Do  not drink alcoholic beverages  · If you have not urinated within 8 hours after discharge, please contact your surgeon on call. Report the following to your surgeon:  · Excessive pain, swelling, redness or odor of or around the surgical area  · Temperature over 100.5  · Nausea and vomiting lasting longer than 4 hours or if unable to take medications  · Any signs of decreased circulation or nerve impairment to extremity: change in color, persistent  numbness, tingling, coldness or increase pain  · Any questions    What to do at Home:  Recommended activity: activity as tolerated and no driving for today. If you experience any symptoms of infection or bleeding, please follow up with Dr. Denise Shepard or your Primary Care Physician.    Please give a list of your current medications to your Primary Care Provider.  Please update this list whenever your medications are discontinued, doses are changed, or new medications (including over-the-counter products) are added.  Please carry medication information at all times in case of emergency situations. These are general instructions for a healthy lifestyle:   No smoking/ No tobacco products/ Avoid exposure to second hand smoke   Surgeon General's Warning:  Quitting smoking now greatly reduces serious risk to your health.  Obesity, smoking, and sedentary lifestyle greatly increases your risk for illness   A healthy diet, regular physical exercise & weight monitoring are important for maintaining a healthy lifestyle. You may be retaining fluid if you have a history of heart failure or if you experience any of the following symptoms:  Weight gain of 3 pounds or more overnight or 5 pounds in a week, increased swelling in our hands or feet or shortness of breath while lying flat in bed. Please call your doctor as soon as you notice any of these symptoms; do not wait until your next office visit. The discharge information has been reviewed with the patient. The patient verbalized understanding. Discharge medications reviewed with the patient and appropriate educational materials and side effects teaching were provided.   _________________________________________________________________________________

## 2022-05-03 NOTE — ROUTINE PROCESS
1015: Cath holding summary     Patient escorted to cath holding from waiting area ambulatory, alert and oriented x 4, voicing no complaints. Changed into gown and placed on monitor. NPO since MN. Lab results, med rec and H&P reviewed on chart. PIV x 1 inserted without difficulty. Family to bedside. 1920: AVS Discharge instructions reviewed with patient and copy given to patient. All questions answered. Patient verbalized understanding to all discharge instructions. PIV removed. Procedural site within normal limits. No hematoma or bleeding noted from procedural and PIV site. No pain noted at discharge. Patient discharged with support person in stable condition. Escorted out to vehicle for transport home.

## 2022-05-03 NOTE — H&P
OUTPATIENT HISTORY AND PHYSICAL      Today 5/3/2022     Indication/Symptoms:   Adriana Toure is a 60 y.o. male with h/o liver cancer presents for an image guided Radioembolization    Current Meds:    Prior to Admission medications    Medication Sig Start Date End Date Taking? Authorizing Provider   HYDROcodone-acetaminophen (Norco) 5-325 mg per tablet Take 1 Tablet by mouth every six (6) hours as needed for Pain for up to 3 days. Max Daily Amount: 4 Tablets. Indications: pain 5/3/22 5/6/22 Yes Yi Denton PA   levoFLOXacin (Levaquin) 500 mg tablet Take 1 Tablet by mouth daily for 10 days. Indications: post Y90 5/3/22 5/13/22 Yes Yi Denton PA   ondansetron (ZOFRAN ODT) 4 mg disintegrating tablet Take 1 Tablet by mouth every six (6) hours as needed for Nausea or Vomiting for up to 30 doses. Indications: prevent radiation-induced nausea and vomiting 5/3/22  Yes Yi Denton PA   allopurinoL (ZYLOPRIM) 300 mg tablet Take 1 Tablet by mouth daily. 4/27/22   Cam Godinez MD   famotidine (PEPCID) 40 mg tablet Take 1 Tablet by mouth daily for 90 days. 4/27/22 7/26/22  Cam Godinez MD   amLODIPine (NORVASC) 10 mg tablet Take 1 Tablet by mouth daily. Indications: high blood pressure 4/27/22   Cam Godinez MD   levocetirizine (XYZAL) 5 mg tablet Take 1 Tablet by mouth daily for 90 days. 4/27/22 7/26/22  Cam Godinez MD   montelukast (SINGULAIR) 10 mg tablet Take 1 Tablet by mouth daily for 90 days. 4/27/22 7/26/22  Cam Godinez MD   traMADoL Aleatha Maser) 50 mg tablet Take 50 mg by mouth every six (6) hours as needed. Provider, James   pantoprazole (PROTONIX) 40 mg tablet Take 1 Tablet by mouth daily for 90 days. 4/27/22 7/26/22  Cam Godinez MD   hydrALAZINE (APRESOLINE) 25 mg tablet Take 1 Tablet by mouth two (2) times a day for 60 days.  4/27/22 6/26/22  Cam Godinez MD   ferrous sulfate 325 mg (65 mg iron) tablet Take 1 tablet by mouth daily 4/27/22   Cam Godinez MD   fluticasone propionate (FLONASE) 50 mcg/actuation nasal spray 2 Sprays by Both Nostrils route daily. 4/27/22   Dustin Solis MD   sildenafil citrate (VIAGRA) 100 mg tablet Take 1 Tablet by mouth daily as needed for Erectile Dysfunction. 2/1/22   Luzma Fine MD   baclofen 5 mg tab TAKE ONE TABLET BY MOUTH THREE TIMES A DAY AS NEEDED FOR HICCUPS 1/11/22   Samreen Hannah NP   sildenafil citrate (VIAGRA) 100 mg tablet TAKE ONE TABLET BY MOUTH ONE HOUR PRIOR TO SEXUAL ACTIVITY   ( MAXIMUM DAILY DOSE 100 MG) 12/7/21   Cynthia Fine MD   FeroSuL 325 mg (65 mg iron) tablet Take one tablet by mouth daily 10/20/21   Samreen Hannah NP   colchicine 0.6 mg tablet Take 1 Tablet by mouth daily. 7/29/21   Krishna Hannah NP   doxycycline (VIBRAMYCIN) 100 mg capsule  4/29/21   Provider, Historical   doxycycline (MONODOX) 100 mg capsule Take 100 mg by mouth two (2) times a day. Provider, Historical       Allergies: Allergies   Allergen Reactions    Lisinopril Anaphylaxis     Patient experienced throat swelling and respiratory distress as a result.          Comorbid Conditions:    Past Medical History:   Diagnosis Date    Arthritis     Chronic pain     joints due to Rheumatoid Arthritis    Contact dermatitis and other eczema, due to unspecified cause     dry skin in scalp    Depression     GERD (gastroesophageal reflux disease)     Gout     Headache(784.0)     Hiccups     Hiccups     Hypertension     Liver disease     Rheumatoid arthritis(714.0) 1983    Rheumatoid arthritis(714.0)     Seasonal allergic rhinitis           Past Surgical History:   Procedure Laterality Date    HX CYST REMOVAL  1983    large cyst removed on left shoulder    IR OCCL TXCATH ARTERY NON HEMMORAGE W SI  4/18/2022       Data:    Visit Vitals  Ht 6' (1.829 m)   Wt 87.1 kg (192 lb)   BMI 26.04 kg/m²   :  Recent Labs     05/03/22  1116        Recent Labs     05/03/22  1116   INR 1.0   APTT 28.9 The H & P and/or progress notes and any available imaging were reviewed. The risks, indications and possible alternatives to the procedure, including doing nothing, were discussed and informed consent was obtained. Physical Exam:      Mental status:   Alert and oriented. Examination specific to the procedure proposed to be performed and any co morbid conditions:      Mallampati classification 3 ,  ASA III   Heart:   RRR. Lungs:   CTAB. No wheezes, rales or rhonchi. The patient is an appropriate candidate to undergo the planned procedure and sedation.     Raine Denson PA-C

## 2022-05-20 ENCOUNTER — TELEPHONE (OUTPATIENT)
Dept: FAMILY MEDICINE CLINIC | Age: 57
End: 2022-05-20

## 2022-06-03 ENCOUNTER — HOSPITAL ENCOUNTER (EMERGENCY)
Age: 57
Discharge: STILL ADMITTED OR ADMINISTRATIVE | End: 2022-06-03

## 2022-06-03 ENCOUNTER — HOSPITAL ENCOUNTER (OUTPATIENT)
Age: 57
Discharge: HOME | End: 2022-06-03
Attending: PHYSICIAN ASSISTANT
Payer: MEDICAID

## 2022-06-03 LAB — CREAT UR-MCNC: 0.8 MG/DL (ref 0.6–1.3)

## 2022-06-03 PROCEDURE — A9577 INJ MULTIHANCE: HCPCS | Performed by: PHYSICIAN ASSISTANT

## 2022-06-03 PROCEDURE — 82565 ASSAY OF CREATININE: CPT

## 2022-06-03 PROCEDURE — 74011250636 HC RX REV CODE- 250/636: Performed by: PHYSICIAN ASSISTANT

## 2022-06-03 PROCEDURE — 74183 MRI ABD W/O CNTR FLWD CNTR: CPT

## 2022-06-03 RX ADMIN — GADOBENATE DIMEGLUMINE 20 ML: 529 INJECTION, SOLUTION INTRAVENOUS at 10:16

## 2022-06-10 ENCOUNTER — HOSPITAL ENCOUNTER (OUTPATIENT)
Dept: LAB | Age: 57
Discharge: HOME | End: 2022-06-10

## 2022-06-10 PROCEDURE — 99001 SPECIMEN HANDLING PT-LAB: CPT

## 2022-06-15 ENCOUNTER — OFFICE VISIT (OUTPATIENT)
Dept: FAMILY MEDICINE CLINIC | Age: 57
End: 2022-06-15
Payer: MEDICAID

## 2022-06-15 VITALS
WEIGHT: 189 LBS | TEMPERATURE: 98.3 F | SYSTOLIC BLOOD PRESSURE: 129 MMHG | DIASTOLIC BLOOD PRESSURE: 70 MMHG | HEART RATE: 58 BPM | RESPIRATION RATE: 20 BRPM | BODY MASS INDEX: 25.6 KG/M2 | OXYGEN SATURATION: 100 % | HEIGHT: 72 IN

## 2022-06-15 DIAGNOSIS — Z13.29 SCREENING FOR ENDOCRINE, METABOLIC AND IMMUNITY DISORDER: ICD-10-CM

## 2022-06-15 DIAGNOSIS — Z13.0 SCREENING FOR ENDOCRINE, METABOLIC AND IMMUNITY DISORDER: ICD-10-CM

## 2022-06-15 DIAGNOSIS — J30.1 SEASONAL ALLERGIC RHINITIS DUE TO POLLEN: ICD-10-CM

## 2022-06-15 DIAGNOSIS — Z13.228 SCREENING FOR ENDOCRINE, METABOLIC AND IMMUNITY DISORDER: ICD-10-CM

## 2022-06-15 DIAGNOSIS — I10 BENIGN ESSENTIAL HYPERTENSION WITH TARGET BLOOD PRESSURE BELOW 140/90: ICD-10-CM

## 2022-06-15 DIAGNOSIS — N52.9 ERECTILE DYSFUNCTION, UNSPECIFIED ERECTILE DYSFUNCTION TYPE: ICD-10-CM

## 2022-06-15 PROBLEM — R06.6 INTRACTABLE HICCUPS: Status: ACTIVE | Noted: 2021-09-28

## 2022-06-15 LAB
HBA1C MFR BLD HPLC: 4.8 %
XX-LABCORP SPECIMEN COL,LCBCF: NORMAL

## 2022-06-15 PROCEDURE — 83036 HEMOGLOBIN GLYCOSYLATED A1C: CPT | Performed by: FAMILY MEDICINE

## 2022-06-15 PROCEDURE — 99213 OFFICE O/P EST LOW 20 MIN: CPT | Performed by: FAMILY MEDICINE

## 2022-06-15 RX ORDER — SILDENAFIL 100 MG/1
100 TABLET, FILM COATED ORAL
Qty: 30 TABLET | Refills: 3 | Status: SHIPPED | OUTPATIENT
Start: 2022-06-15

## 2022-06-15 RX ORDER — FLUTICASONE PROPIONATE 50 MCG
SPRAY, SUSPENSION (ML) NASAL
Qty: 1 EACH | Refills: 11 | Status: SHIPPED | OUTPATIENT
Start: 2022-06-15

## 2022-06-15 RX ORDER — ONDANSETRON HYDROCHLORIDE 8 MG/1
TABLET, FILM COATED ORAL
COMMUNITY
Start: 2022-06-13

## 2022-06-15 RX ORDER — CAPECITABINE 500 MG/1
TABLET, FILM COATED ORAL
COMMUNITY
Start: 2022-06-09

## 2022-06-15 RX ORDER — CETIRIZINE HCL 10 MG
TABLET ORAL
COMMUNITY
Start: 2022-05-30

## 2022-06-15 RX ORDER — INDOMETHACIN 50 MG/1
CAPSULE ORAL
COMMUNITY
Start: 2022-05-31

## 2022-06-15 NOTE — PROGRESS NOTES
1. \"Have you been to the ER, urgent care clinic since your last visit? Hospitalized since your last visit? \" No    2. \"Have you seen or consulted any other health care providers outside of the [address] [place] [address] since your last visit? \" No     3. For patients aged 39-70: Has the patient had a colonoscopy / FIT/ Cologuard? Yes - no Care Gap present      If the patient is female:    4. For patients aged 41-77: Has the patient had a mammogram within the past 2 years? NA - based on age or sex      11. For patients aged 21-65: Has the patient had a pap smear?  NA - based on age or sex

## 2022-06-15 NOTE — PATIENT INSTRUCTIONS
Eating Healthy Foods: Care Instructions  Your Care Instructions     Eating healthy foods can help lower your risk for disease. Healthy food gives you energy and keeps your heart strong, your brain active, your muscles working, and your bones strong. A healthy diet includes a variety of foods from the basic food groups: grains, vegetables, fruits, milk and milk products, and meat and beans. Some people may eat more of their favorite foods from only one food group and, as a result, miss getting the nutrients they need. So, it is important to pay attention not only to what you eat but also to what you are missing from your diet. You can eat a healthy, balanced diet by making a few small changes. Follow-up care is a key part of your treatment and safety. Be sure to make and go to all appointments, and call your doctor if you are having problems. It's also a good idea to know your test results and keep a list of the medicines you take. How can you care for yourself at home? Look at what you eat  · Keep a food diary for a week or two and record everything you eat or drink. Track the number of servings you eat from each food group. · For a balanced diet every day, eat a variety of:  ? 6 or more ounce-equivalents of grains, such as cereals, breads, crackers, rice, or pasta, every day. An ounce-equivalent is 1 slice of bread, 1 cup of ready-to-eat cereal, or ½ cup of cooked rice, cooked pasta, or cooked cereal.  ? 2½ cups of vegetables, especially:  § Dark-green vegetables such as broccoli and spinach. § Orange vegetables such as carrots and sweet potatoes. § Dry beans (such as telles and kidney beans) and peas (such as lentils). ? 2 cups of fresh, frozen, or canned fruit. A small apple or 1 banana or orange equals 1 cup. ? 3 cups of nonfat or low-fat milk, yogurt, or other milk products. ? 5½ ounces of meat and beans, such as chicken, fish, lean meat, beans, nuts, and seeds.  One egg, 1 tablespoon of peanut butter, ½ ounce nuts or seeds, or ¼ cup of cooked beans equals 1 ounce of meat. · Learn how to read food labels for serving sizes and ingredients. Fast-food and convenience-food meals often contain few or no fruits or vegetables. Make sure you eat some fruits and vegetables to make the meal more nutritious. · Look at your food diary. For each food group, add up what you have eaten and then divide the total by the number of days. This will give you an idea of how much you are eating from each food group. See if you can find some ways to change your diet to make it more healthy. Start small  · Do not try to make dramatic changes to your diet all at once. You might feel that you are missing out on your favorite foods and then be more likely to fail. · Start slowly, and gradually change your habits. Try some of the following:  ? Use whole wheat bread instead of white bread. ? Use nonfat or low-fat milk instead of whole milk. ? Eat brown rice instead of white rice, and eat whole wheat pasta instead of white-flour pasta. ? Try low-fat cheeses and low-fat yogurt. ? Add more fruits and vegetables to meals and have them for snacks. ? Add lettuce, tomato, cucumber, and onion to sandwiches. ? Add fruit to yogurt and cereal.  Enjoy food  · You can still eat your favorite foods. You just may need to eat less of them. If your favorite foods are high in fat, salt, and sugar, limit how often you eat them, but do not cut them out entirely. · Eat a wide variety of foods. Make healthy choices when eating out  · The type of restaurant you choose can help you make healthy choices. Even fast-food chains are now offering more low-fat or healthier choices on the menu. · Choose smaller portions, or take half of your meal home. · When eating out, try:  ? A veggie pizza with a whole wheat crust or grilled chicken (instead of sausage or pepperoni).   ? Pasta with roasted vegetables, grilled chicken, or marinara sauce instead of cream sauce. ? A vegetable wrap or grilled chicken wrap. ? Broiled or poached food instead of fried or breaded items. Make healthy choices easy  · Buy packaged, prewashed, ready-to-eat fresh vegetables and fruits, such as baby carrots, salad mixes, and chopped or shredded broccoli and cauliflower. · Buy packaged, presliced fruits, such as melon or pineapple. · Choose 100% fruit or vegetable juice instead of soda. Limit juice intake to 4 to 6 oz (½ to ¾ cup) a day. · Blend low-fat yogurt, fruit juice, and canned or frozen fruit to make a smoothie for breakfast or a snack. Where can you learn more? Go to http://www.fan.com/  Enter T756 in the search box to learn more about \"Eating Healthy Foods: Care Instructions. \"  Current as of: September 8, 2021               Content Version: 13.2  © 2006-2022 [employer]. Care instructions adapted under license by [employer] (which disclaims liability or warranty for this information). If you have questions about a medical condition or this instruction, always ask your healthcare professional. [place] [address] any warranty or liability for your use of this information.

## 2022-06-15 NOTE — PROGRESS NOTES
HPI  This is a 78-year-old -American male with past medical history significant for hypertension, cervical spondylosis without myelopathy, degenerative disc disease, rheumatoid arthritis involving multiple joints, depression, hyperuricemia, anxiety, BPH/ED (follows up with Dr. Marvel Eid, cholangiocarcinoma (follows ups up with Dr. Wilder Mota) who is here to follow-up on chronic conditions. Hypertension  Due to history of gout patient was taken off of HCTZ and switched to hydralazine 25 mg BID and continues on amlodipine 10 mg daily and he has well controlled BP at this time. Denies any headaches, blurred vision, chest pain, shortness of breath, palpitations, swelling in legs, claudication. Gout has improved since I last saw patient. Seasonal Allergies/ Sinusitis  Patient states that he continues to have congestion and runny nose along with itchy eyes and sneezing now that the pollen is out. He was switched to Xyzal and started on montelukast 10 mg daily along he states that symptoms have improved but he continues to have some congestion. Advised that he should see ENT if he continues to have issues. He would like refill on his flonase at this time. Cholangiocarcinoma  Patient is about to start Chemotherapy on Monday. He would like to know if any of his medications will interact with his Xeloda. Found to have interaction with allopurinol.  Allopurinol said to decrease effect, but advised patient to follow up with heme/onc physician and ask them about it as they are better versed in the interactions of chemotherapy drugs than I am.     Past Medical History  Past Medical History:   Diagnosis Date    Arthritis     Chronic pain     joints due to Rheumatoid Arthritis    Contact dermatitis and other eczema, due to unspecified cause     dry skin in scalp    Depression     GERD (gastroesophageal reflux disease)     Gout     Headache(784.0)     Hiccups     Hiccups     Hypertension     Liver disease     Liver mass     Rheumatoid arthritis(714.0) 1983    Rheumatoid arthritis(714.0)     Seasonal allergic rhinitis        Surgical History  Past Surgical History:   Procedure Laterality Date    HX CYST REMOVAL  1983    large cyst removed on left shoulder    IR OCCL TXCATH ARTERY NON HEMMORAGE W SI  4/18/2022    IR OCCL TXCATH ORGAN W SI  5/3/2022        Medications  Current Outpatient Medications   Medication Sig Dispense Refill    cetirizine (ZYRTEC) 10 mg tablet       indomethacin (INDOCIN) 50 mg capsule       ondansetron hcl (ZOFRAN) 8 mg tablet       sildenafil citrate (VIAGRA) 100 mg tablet Take 1 Tablet by mouth daily as needed for Erectile Dysfunction. 30 Tablet 3    fluticasone propionate (FLONASE) 50 mcg/actuation nasal spray One spray in each nostril once a day at bedtime 1 Each 11    allopurinoL (ZYLOPRIM) 300 mg tablet Take 1 Tablet by mouth daily. 90 Tablet 0    famotidine (PEPCID) 40 mg tablet Take 1 Tablet by mouth daily for 90 days. 90 Tablet 0    amLODIPine (NORVASC) 10 mg tablet Take 1 Tablet by mouth daily. Indications: high blood pressure 90 Tablet 1    levocetirizine (XYZAL) 5 mg tablet Take 1 Tablet by mouth daily for 90 days. 90 Tablet 0    pantoprazole (PROTONIX) 40 mg tablet Take 1 Tablet by mouth daily for 90 days. 90 Tablet 0    hydrALAZINE (APRESOLINE) 25 mg tablet Take 1 Tablet by mouth two (2) times a day for 60 days. 120 Tablet 0    baclofen 5 mg tab TAKE ONE TABLET BY MOUTH THREE TIMES A DAY AS NEEDED FOR HICCUPS 90 Tablet 0    FeroSuL 325 mg (65 mg iron) tablet Take one tablet by mouth daily 90 Tablet 1    doxycycline (MONODOX) 100 mg capsule Take 100 mg by mouth two (2) times a day.  capecitabine (XELODA) 500 mg tablet  (Patient not taking: Reported on 6/15/2022  )         Allergies  Allergies   Allergen Reactions    Lisinopril Anaphylaxis     Patient experienced throat swelling and respiratory distress as a result.          Family History  Family History   Problem Relation Age of Onset    Emphysema Mother    [facility] Arthritis-rheumatoid Mother     Emphysema Father     Arthritis-rheumatoid Father     Cancer Sister 35        breast    Liver Disease Brother     Thyroid Disease Brother        Social History  Social History     Socioeconomic History    Marital status:      Spouse name: Not on file    Number of children: Not on file    Years of education: Not on file    Highest education level: Not on file   Occupational History    Not on file   Tobacco Use    Smoking status: Current Every Day Smoker     Packs/day: 1.00     Years: 25.00     Pack years: 25.00     Types: Cigarettes    Smokeless tobacco: Never Used   Vaping Use    Vaping Use: Not on file   Substance and Sexual Activity    Alcohol use: Yes     Comment: occasionally    Drug use: Not Currently     Types: OTC, Prescription    Sexual activity: Yes     Partners: Female   Other Topics Concern    Not on file   Social History Narrative    Not on file     Social Determinants of Health     Financial Resource Strain:     Difficulty of Paying Living Expenses: Not on file   Food Insecurity:     Worried About Running Out of Food in the Last Year: Not on file    Chris of Food in the Last Year: Not on file   Transportation Needs:     Lack of Transportation (Medical): Not on file    Lack of Transportation (Non-Medical):  Not on file   Physical Activity:     Days of Exercise per Week: Not on file    Minutes of Exercise per Session: Not on file   Stress:     Feeling of Stress : Not on file   Social Connections:     Frequency of Communication with Friends and Family: Not on file    Frequency of Social Gatherings with Friends and Family: Not on file    Attends Yazidi Services: Not on file    Active Member of Clubs or Organizations: Not on file    Attends Club or Organization Meetings: Not on file    Marital Status: Not on file   Intimate Partner Violence:     Fear of Current or Ex-Partner: Not on file    Emotionally Abused: Not on file    Physically Abused: Not on file    Sexually Abused: Not on file   Housing Stability:     Unable to Pay for Housing in the Last Year: Not on file    Number of Places Lived in the Last Year: Not on file    Unstable Housing in the Last Year: Not on file       Review of Systems  Review of Systems   Constitutional: Negative for chills and fever. Respiratory: Negative for cough and shortness of breath. Cardiovascular: Negative for chest pain and leg swelling. Gastrointestinal: Negative for abdominal pain, nausea and vomiting. Skin: Negative for rash. Neurological: Negative for dizziness and headaches. Vital Signs  Visit Vitals  /70 (BP 1 Location: Left upper arm, BP Patient Position: Sitting, BP Cuff Size: Adult long)   Pulse (!) 58   Temp 98.3 °F (36.8 °C) (Temporal)   Resp 20   Ht 6' (1.829 m)   Wt 189 lb (85.7 kg)   SpO2 100%   BMI 25.63 kg/m²         Physical Exam  Physical Exam  Constitutional:       Appearance: Normal appearance. HENT:      Head: Normocephalic and atraumatic. Nose: Nose normal.   Eyes:      General: No scleral icterus. Conjunctiva/sclera: Conjunctivae normal.   Cardiovascular:      Rate and Rhythm: Normal rate and regular rhythm. Heart sounds: Normal heart sounds. No murmur heard. No gallop. Pulmonary:      Effort: Pulmonary effort is normal. No respiratory distress. Breath sounds: No wheezing. Abdominal:      General: There is no distension. Palpations: Abdomen is soft. Musculoskeletal:      Cervical back: Normal range of motion and neck supple. Right lower leg: No edema. Left lower leg: No edema. Skin:     Findings: No erythema or rash. Neurological:      Mental Status: He is alert and oriented to person, place, and time. Mental status is at baseline.                 Results  Results for orders placed or performed in visit on 06/15/22   AMB POC HEMOGLOBIN A1C Result Value Ref Range    Hemoglobin A1c (POC) 4.8 %       ASSESSMENT and PLAN  1. Benign essential hypertension with target blood pressure below 140/90  -medication regimen: Continue amlodipine 10 mg and hydralazine 25 mg twice daily.  -counseled about diet rich in green leafy vegetables/protein, decrease intake of red meat/sodium/and cholesterol.   -counseled about 150 min of moderate intensity exercise a week. Alternating between cardio and strength training.   -counseled about medication adherence and weight loss  -counseled to keep log of blood pressure at home and bring to next appointment   -Return visit: Advised to establish care with new PCP in the next 3 months as I am leaving July 1. List of providers in the area accepting new patients given to patient today. 2. Erectile dysfunction, unspecified erectile dysfunction type  -counseled about side effects of possible hypotension when mixed with nitroglycerin, chest pain and dizziness as this can lower BP as well. - sildenafil citrate (VIAGRA) 100 mg tablet; Take 1 Tablet by mouth daily as needed for Erectile Dysfunction. Dispense: 30 Tablet; Refill: 3    3. Screening for endocrine, metabolic and immunity disorder  - AMB POC HEMOGLOBIN A1C: 4.8% in office today    4. Seasonal allergic rhinitis due to pollen  - fluticasone propionate (FLONASE) 50 mcg/actuation nasal spray; One spray in each nostril once a day at bedtime  Dispense: 1 Each; Refill: 11           Follow-up and Dispositions    · Return in about 3 months (around 9/15/2022), or if symptoms worsen or fail to improve, for est care with new PCP-list given to patient today. I have discussed the diagnosis with the patient and the intended plan of care as seen in the above orders. The patient has received an after-visit summary and questions were answered concerning future plans. I have discussed medication, side effects, and warnings with the patient in detail.  The patient verbalized understanding and is in agreement with the plan of care. The patient will contact the office with any additional concerns. I spent at least 30 minutes on this visit with this established patient. Princess Cao MD    PLEASE NOTE:   This document has been produced using voice recognition software.  Unrecognized errors in transcription may be present

## 2022-06-17 ENCOUNTER — TELEPHONE (OUTPATIENT)
Dept: GENERAL RADIOLOGY | Age: 57
End: 2022-06-17

## 2022-06-17 DIAGNOSIS — C22.1 CHOLANGIOCARCINOMA (HCC): Primary | ICD-10-CM

## 2022-06-17 NOTE — TELEPHONE ENCOUNTER
Interventional Radiology Clinic Follow Up Note    Patient: Nickie Tejada             Sex: male        Date of Visit: 6/17/22    YOB: 1965      Age:  60 y.o. Referring Physician: Dr. Florina Ryan          HPI:     Nickie Tejada is a 60 y.o. male who has been seen in follow up of cholangiocarcinoma. Recent IR Procedures:  TARE of segments 4, 7, and 8 on 5/03/2022    Most recent MR imaging obtained on 6/03/22 demonstrates good response to therapy, with the hepatic lesion previously measuring 6.9 x 6.7 cm now decreased in size to 6.0 x 5.6 cm. He continues to follow with Dr. Sylvain Scott for management of his cholangiocarcinoma and plans to start chemotherapy (Xeloda) 6/20/22. Today, the patient denies current abdominal pain, edema, jaundice, asterixis, confusion, easy bruising, easy bleeding, or current alcohol use. He reports that he did not experience any side effects after the Y90 treatment, and remained active during his recovery period. Prashanth Babb is up and moving around > 50% of the day able to complete household tasks. He enjoys doing yard work and riding his bike. Past Medical History:   Diagnosis Date    Arthritis     Chronic pain     joints due to Rheumatoid Arthritis    Contact dermatitis and other eczema, due to unspecified cause     dry skin in scalp    Depression     GERD (gastroesophageal reflux disease)     Gout     Headache(784.0)     Hiccups     Hiccups     Hypertension     Liver disease     Liver mass     Rheumatoid arthritis(714.0) 1983    Rheumatoid arthritis(714.0)     Seasonal allergic rhinitis      Past Surgical History:   Procedure Laterality Date    HX CYST REMOVAL  1983    large cyst removed on left shoulder    IR OCCL TXCATH ARTERY NON HEMMORAGE W SI  4/18/2022    IR OCCL TXCATH ORGAN W SI  5/3/2022     Prior to Admission medications    Medication Sig Start Date End Date Taking?  Authorizing Provider   capecitabine (XELODA) 500 mg tablet 6/9/22   Provider, Historical   cetirizine (ZYRTEC) 10 mg tablet  5/30/22   Provider, Historical   indomethacin (INDOCIN) 50 mg capsule  5/31/22   Provider, Historical   ondansetron hcl (ZOFRAN) 8 mg tablet  6/13/22   Provider, Historical   sildenafil citrate (VIAGRA) 100 mg tablet Take 1 Tablet by mouth daily as needed for Erectile Dysfunction. 6/15/22   Shantell Way MD   fluticasone propionate (FLONASE) 50 mcg/actuation nasal spray One spray in each nostril once a day at bedtime 6/15/22   Shantell Way MD   allopurinoL (ZYLOPRIM) 300 mg tablet Take 1 Tablet by mouth daily. 4/27/22   Shantell Way MD   famotidine (PEPCID) 40 mg tablet Take 1 Tablet by mouth daily for 90 days. 4/27/22 7/26/22  Shantell Way MD   amLODIPine (NORVASC) 10 mg tablet Take 1 Tablet by mouth daily. Indications: high blood pressure 4/27/22   Shantell Way MD   levocetirizine (XYZAL) 5 mg tablet Take 1 Tablet by mouth daily for 90 days. 4/27/22 7/26/22  Shantell Way MD   pantoprazole (PROTONIX) 40 mg tablet Take 1 Tablet by mouth daily for 90 days. 4/27/22 7/26/22  Shantell Way MD   hydrALAZINE (APRESOLINE) 25 mg tablet Take 1 Tablet by mouth two (2) times a day for 60 days. 4/27/22 6/26/22  Shantell Way MD   baclofen 5 mg tab TAKE ONE TABLET BY MOUTH THREE TIMES A DAY AS NEEDED FOR HICCUPS 1/11/22   Fredo Hannah NP   FeroSuL 325 mg (65 mg iron) tablet Take one tablet by mouth daily 10/20/21   Samreen Hannah NP   doxycycline (MONODOX) 100 mg capsule Take 100 mg by mouth two (2) times a day. Provider, Historical     Allergies   Allergen Reactions    Lisinopril Anaphylaxis     Patient experienced throat swelling and respiratory distress as a result. Review of Systems  Pertinent items are noted in the History of Present Illness.     Physical Exam:   The physical exam was deferred    Labs Reviewed and notable for:   6/10/22 from 5/03/22    AST / ALT: 37/41 from 15/18  T Bili: 0.3 from 0.4  Albumin:4.5 from 3.7  Platelets: 096  INR: 1.0    Assessment   Hepatic lesion biopsy proven to be cholangiocarcinoma s/p transarterial radioembolization 5/03/22. Recent imaging shows favorable response to treatment. MELD-Na score 6    Kaelyn Glenna Classification A    Cancer location: central hepatic lesion previously 6.9 x 6.7 cm (April 2022) now decreased in size to 6.0 x 5.6 cm (06/03/22). The patient is not a candidate for surgical resection, as previously evaluated by Dr. Nataliia Romo with surgical oncology. ECOG 0    Goal of treatment - delaying time to progression of disease     Plan   Case and images reviewed extensively by Dr. Omayra Lowe. The plan is as follows:    1. 3 month follow up with MRI and labs prior  2. Continue to follow with IR and with Dr. Scott Shoemaker    I have spent 25 minutes with the patient with greater than 50% of the time dedicated to the patient's counseling as well as the coordination of patient care.     Thank you,  SUSAN Cohen

## 2022-07-22 ENCOUNTER — TRANSCRIBE ORDER (OUTPATIENT)
Dept: SCHEDULING | Age: 57
End: 2022-07-22

## 2022-07-22 DIAGNOSIS — C22.1 CHOLANGIOCARCINOMA (HCC): Primary | ICD-10-CM

## 2022-07-27 DIAGNOSIS — J32.9 CHRONIC SINUSITIS, UNSPECIFIED LOCATION: ICD-10-CM

## 2022-07-27 RX ORDER — MONTELUKAST SODIUM 10 MG/1
10 TABLET ORAL DAILY
Qty: 90 TABLET | Refills: 0 | Status: CANCELLED | OUTPATIENT
Start: 2022-07-27 | End: 2022-10-25

## 2022-07-27 NOTE — TELEPHONE ENCOUNTER
This pharmacy faxed over request for the following prescriptions to be filled:    Medication requested :   Requested Prescriptions     Pending Prescriptions Disp Refills    montelukast (SINGULAIR) 10 mg tablet 90 Tablet 0     Sig: Take 1 Tablet by mouth in the morning for 90 days. PCP: Dr. Shayy Hyatt or Print: [facility]  Mail order or Local pharmacy: [facility]    Scheduled appointment if not seen by current providers in office: Pt has to find new pcp.

## 2022-07-28 RX ORDER — FAMOTIDINE 40 MG/1
TABLET, FILM COATED ORAL
Qty: 30 TABLET | Refills: 2 | Status: SHIPPED | OUTPATIENT
Start: 2022-07-28 | End: 2022-08-27

## 2022-08-03 DIAGNOSIS — M10.00 IDIOPATHIC GOUT, UNSPECIFIED CHRONICITY, UNSPECIFIED SITE: ICD-10-CM

## 2022-08-03 RX ORDER — PREDNISONE 20 MG/1
20 TABLET ORAL
Qty: 4 TABLET | Refills: 0 | OUTPATIENT
Start: 2022-08-03 | End: 2022-08-07

## 2022-08-03 NOTE — TELEPHONE ENCOUNTER
This pharmacy faxed over request for the following prescriptions to be filled:    Medication requested :   Requested Prescriptions     Pending Prescriptions Disp Refills    predniSONE (DELTASONE) 20 mg tablet 4 Tablet 0     Sig: Take 1 Tablet by mouth daily (with breakfast) for 4 days.        PCP: Dr. Benton Lombard or Print: [address] [place] [address]  Mail order or Local pharmacy: [address] [place] [address]     Scheduled appointment if not seen by current providers in office: Pt has to find new pcp

## 2022-09-12 ENCOUNTER — HOSPITAL ENCOUNTER (OUTPATIENT)
Age: 57
Discharge: HOME | End: 2022-09-12
Attending: RADIOLOGY
Payer: MEDICAID

## 2022-09-12 DIAGNOSIS — C22.1 CHOLANGIOCARCINOMA (HCC): ICD-10-CM

## 2022-09-12 LAB — CREAT UR-MCNC: 0.8 MG/DL (ref 0.6–1.3)

## 2022-09-12 PROCEDURE — 74183 MRI ABD W/O CNTR FLWD CNTR: CPT

## 2022-09-12 PROCEDURE — 74011250636 HC RX REV CODE- 250/636: Performed by: RADIOLOGY

## 2022-09-12 PROCEDURE — A9577 INJ MULTIHANCE: HCPCS | Performed by: RADIOLOGY

## 2022-09-12 PROCEDURE — 82565 ASSAY OF CREATININE: CPT

## 2022-09-12 RX ADMIN — GADOBENATE DIMEGLUMINE 20 ML: 529 INJECTION, SOLUTION INTRAVENOUS at 08:43

## 2022-09-15 ENCOUNTER — TELEPHONE (OUTPATIENT)
Dept: GENERAL RADIOLOGY | Age: 57
End: 2022-09-15

## 2022-09-15 DIAGNOSIS — C22.1 CHOLANGIOCARCINOMA (HCC): Primary | ICD-10-CM

## 2022-09-15 NOTE — TELEPHONE ENCOUNTER
Interventional Radiology Clinic Follow Up Note    Patient: Buster Castro             Sex: male        Date of Visit: 9/23/22    YOB: 1965      Age:  59 y.o. Referring Physician: Dr. Max Mccall        HPI:     Buster Castro is a 59 y.o. male who has been evaluated in follow up of cholangiocarcinoma. Recent IR Procedures:  TARE of segments 4, 7, and 8 on 5/03/2022    Most recent MR imaging obtained on 9/12/22 demonstrates either possible progression of disease in segments 5/8 v post radiation changes. He continues to follow with Dr. Max Mccall for management of his cholangiocarcinoma and plans to start chemotherapy (Xeloda) 6/20/22. Interview was not able to be obtained with the patient as the visit was a virtual attempt and multiple phone calls were unable to be answered. Discussions held between Dr. Katlyn Seo and Dr. Fredis Lynne    Past Medical History:   Diagnosis Date    Arthritis     Chronic pain     joints due to Rheumatoid Arthritis    Contact dermatitis and other eczema, due to unspecified cause     dry skin in scalp    Depression     GERD (gastroesophageal reflux disease)     Gout     Headache(784.0)     Hiccups     Hiccups     Hypertension     Liver disease     Liver mass     Rheumatoid arthritis(714.0) 1983    Rheumatoid arthritis(714.0)     Seasonal allergic rhinitis      Past Surgical History:   Procedure Laterality Date    HX CYST REMOVAL  1983    large cyst removed on left shoulder    IR OCCL TXCATH ARTERY NON HEMMORAGE W SI  4/18/2022    IR OCCL TXCATH ORGAN W SI  5/3/2022     Prior to Admission medications    Medication Sig Start Date End Date Taking? Authorizing Provider   hydrALAZINE (APRESOLINE) 25 mg tablet Take 1 Tablet by mouth two (2) times a day for 90 days.  6/24/22 9/22/22  Jadon Patel MD   capecitabine (XELODA) 500 mg tablet  6/9/22   Provider, Historical   cetirizine (ZYRTEC) 10 mg tablet  5/30/22   Provider, Historical   indomethacin (INDOCIN) 50 mg capsule 5/31/22   Provider, Historical   ondansetron hcl (ZOFRAN) 8 mg tablet  6/13/22   Provider, Historical   sildenafil citrate (VIAGRA) 100 mg tablet Take 1 Tablet by mouth daily as needed for Erectile Dysfunction. 6/15/22   Iron Hayden MD   fluticasone propionate (FLONASE) 50 mcg/actuation nasal spray One spray in each nostril once a day at bedtime 6/15/22   Iron Hayden MD   allopurinoL (ZYLOPRIM) 300 mg tablet Take 1 Tablet by mouth daily. 4/27/22   Iron Hayden MD   amLODIPine (NORVASC) 10 mg tablet Take 1 Tablet by mouth daily. Indications: high blood pressure 4/27/22   Iron Hayden MD   baclofen 5 mg tab TAKE ONE TABLET BY MOUTH THREE TIMES A DAY AS NEEDED FOR HICCUPS 1/11/22   Arian Hannah NP   FeroSuL 325 mg (65 mg iron) tablet Take one tablet by mouth daily 10/20/21   Samreen Hannah NP   doxycycline (MONODOX) 100 mg capsule Take 100 mg by mouth two (2) times a day. Provider, Historical     Allergies   Allergen Reactions    Lisinopril Anaphylaxis     Patient experienced throat swelling and respiratory distress as a result. Review of Systems  Pertinent items are noted in the History of Present Illness. Physical Exam:   The physical exam was deferred    Labs Reviewed and notable for:   6/10/22 from 5/03/22    AST / ALT: 37/41 from 15/18  T Bili: 0.3 from 0.4  Albumin:4.5 from 3.7  Platelets: 446  INR: 1.0    Assessment   Hepatic lesion biopsy proven to be cholangiocarcinoma s/p transarterial radioembolization 5/03/22. Recent imaging shows either disease progression or post treatment changes in segment 5/8. Too soon to know for sure.  Dr. Waylon Mario discussed with Dr. Spencer Carranza    We will closely follow the questionable post treatment changes v progression of disease in segment 5/8    MELD-Na score 6    [address] location: segment 5/8    The patient is not a candidate for surgical resection, as previously evaluated by Dr. Zahraa Gracia with surgical oncology. ECOG 0    Goal of treatment - delaying time to progression of disease     Plan   Case and images reviewed extensively by Dr. Navin Russo.      The plan is as follows:    3 month clinic follow up with MRI and labs prior    This is not a billable visit - information in the patient chart for care coordination only    Thank you,  SUSAN Montes

## 2022-10-28 ENCOUNTER — TRANSCRIBE ORDER (OUTPATIENT)
Dept: SCHEDULING | Age: 57
End: 2022-10-28

## 2022-10-28 DIAGNOSIS — C24.8 MALIGNANT NEOPLASM OF OVERLAPPING SITES OF BILIARY TRACT (HCC): Primary | ICD-10-CM

## 2022-11-21 DIAGNOSIS — N52.9 ERECTILE DYSFUNCTION, UNSPECIFIED ERECTILE DYSFUNCTION TYPE: ICD-10-CM

## 2022-11-21 NOTE — TELEPHONE ENCOUNTER
This patient contacted office for the following prescriptions to be filled:    Last office visit: 6/15/22  Follow up appointment: Pt has to find new pcp. Medication requested :   Requested Prescriptions     Pending Prescriptions Disp Refills    sildenafil citrate (VIAGRA) 100 mg tablet 30 Tablet 3     Sig: Take 1 Tablet by mouth daily as needed for Erectile Dysfunction.        PCP: Carolyn Colorado  Mail order or Local pharmacy name: [facility]

## 2022-11-25 ENCOUNTER — OFFICE VISIT (OUTPATIENT)
Dept: INTERNAL MEDICINE CLINIC | Age: 57
End: 2022-11-25
Payer: MEDICAID

## 2022-11-25 ENCOUNTER — HOSPITAL ENCOUNTER (OUTPATIENT)
Age: 57
Discharge: HOME | End: 2022-11-25
Attending: INTERNAL MEDICINE
Payer: MEDICAID

## 2022-11-25 ENCOUNTER — LAB ONLY (OUTPATIENT)
Dept: INTERNAL MEDICINE CLINIC | Age: 57
End: 2022-11-25

## 2022-11-25 ENCOUNTER — TELEPHONE (OUTPATIENT)
Dept: INTERNAL MEDICINE CLINIC | Age: 57
End: 2022-11-25

## 2022-11-25 VITALS
DIASTOLIC BLOOD PRESSURE: 89 MMHG | RESPIRATION RATE: 18 BRPM | WEIGHT: 186 LBS | BODY MASS INDEX: 25.19 KG/M2 | HEART RATE: 64 BPM | HEIGHT: 72 IN | SYSTOLIC BLOOD PRESSURE: 138 MMHG | TEMPERATURE: 98.2 F

## 2022-11-25 DIAGNOSIS — C22.1 CHOLANGIOCARCINOMA (HCC): ICD-10-CM

## 2022-11-25 DIAGNOSIS — M06.9 RHEUMATOID ARTHRITIS, INVOLVING UNSPECIFIED SITE, UNSPECIFIED WHETHER RHEUMATOID FACTOR PRESENT (HCC): ICD-10-CM

## 2022-11-25 DIAGNOSIS — Z76.89 ENCOUNTER TO ESTABLISH CARE: Primary | ICD-10-CM

## 2022-11-25 DIAGNOSIS — Z12.5 SCREENING FOR PROSTATE CANCER: ICD-10-CM

## 2022-11-25 DIAGNOSIS — N52.9 ERECTILE DYSFUNCTION, UNSPECIFIED ERECTILE DYSFUNCTION TYPE: ICD-10-CM

## 2022-11-25 DIAGNOSIS — C24.8 MALIGNANT NEOPLASM OF OVERLAPPING SITES OF BILIARY TRACT (HCC): ICD-10-CM

## 2022-11-25 DIAGNOSIS — Z12.2 SCREENING FOR LUNG CANCER: ICD-10-CM

## 2022-11-25 DIAGNOSIS — J30.1 SEASONAL ALLERGIC RHINITIS DUE TO POLLEN: ICD-10-CM

## 2022-11-25 DIAGNOSIS — M50.30 DDD (DEGENERATIVE DISC DISEASE), CERVICAL: ICD-10-CM

## 2022-11-25 DIAGNOSIS — I10 PRIMARY HYPERTENSION: ICD-10-CM

## 2022-11-25 DIAGNOSIS — E79.0 HYPERURICEMIA: ICD-10-CM

## 2022-11-25 DIAGNOSIS — Z12.11 SCREEN FOR COLON CANCER: ICD-10-CM

## 2022-11-25 PROCEDURE — 82565 ASSAY OF CREATININE: CPT

## 2022-11-25 PROCEDURE — 3074F SYST BP LT 130 MM HG: CPT | Performed by: INTERNAL MEDICINE

## 2022-11-25 PROCEDURE — 74011250636 HC RX REV CODE- 250/636: Performed by: INTERNAL MEDICINE

## 2022-11-25 PROCEDURE — 74183 MRI ABD W/O CNTR FLWD CNTR: CPT

## 2022-11-25 PROCEDURE — 3078F DIAST BP <80 MM HG: CPT | Performed by: INTERNAL MEDICINE

## 2022-11-25 PROCEDURE — A9577 INJ MULTIHANCE: HCPCS | Performed by: INTERNAL MEDICINE

## 2022-11-25 PROCEDURE — 99204 OFFICE O/P NEW MOD 45 MIN: CPT | Performed by: INTERNAL MEDICINE

## 2022-11-25 RX ORDER — SILDENAFIL 100 MG/1
100 TABLET, FILM COATED ORAL
Qty: 30 TABLET | Refills: 3 | Status: SHIPPED | OUTPATIENT
Start: 2022-11-25 | End: 2022-12-25

## 2022-11-25 RX ORDER — HYDRALAZINE HYDROCHLORIDE 10 MG/1
10 TABLET, FILM COATED ORAL 2 TIMES DAILY
COMMUNITY

## 2022-11-25 RX ORDER — HYDROCHLOROTHIAZIDE 12.5 MG/1
12.5 TABLET ORAL DAILY
COMMUNITY
End: 2022-11-25

## 2022-11-25 RX ADMIN — GADOBENATE DIMEGLUMINE 20 ML: 529 INJECTION, SOLUTION INTRAVENOUS at 08:26

## 2022-11-25 NOTE — TELEPHONE ENCOUNTER
----- Message from Frida Tejada LPN sent at [id number]/4163 11:16 AM EST -----    ----- Message -----  From: Nando Collins MD  Sent: 11/25/2022  11:01 AM EST  To: [facility]    Please inform the patient that on reviewing of old records hydrochlorothiazide was stopped and hydralazine 25 mg twice a day was started in place of hydrochlorothiazide. Patient needs to stop taking hydrochlorothiazide.

## 2022-11-25 NOTE — PROGRESS NOTES
Lynn Braden is a 59y.o. year old male who is a new patient to me today. He was previous follow with Dr Stephie Mcgovern. Subjective:   Lynn Braden was seen today for Establish Care    78-year-old -American male with past medical history significant for hypertension, cervical spondylosis with myelopathy, did degenerative disc disease, rheumatoid arthritis involving multiple joints, depression, hyperuricemia, BPH/ED, hyperuricemia, cholangiocarcinoma is coming in today to establish care. High blood pressure patient was on hydrochlorothiazide and amlodipine. Because of hyperuricemia hydrochlorothiazide was stopped and patient was started on hydralazine 25 mg twice a day. Patient does not have any symptoms suggestive of chest pain/shortness of breath/palpitations/patient edema. For cholangiocarcinoma patient was started on chemotherapy by Dr. Gilson May. Patient has smoked for 1 pack every day for more than 35 years. Quit smoking last year. For seasonal allergies patient uses Flonase, cetirizine, stable. Patient has a history of BPH, follows with . Patient requesting refill on sildenafil.       Past Medical History:   Diagnosis Date    Arthritis     Chronic pain     joints due to Rheumatoid Arthritis    Contact dermatitis and other eczema, due to unspecified cause     dry skin in scalp    Depression     GERD (gastroesophageal reflux disease)     Gout     Headache(784.0)     Hiccups     Hiccups     Hypertension     Liver disease     Liver mass     Rheumatoid arthritis(714.0) 1983    Rheumatoid arthritis(714.0)     Seasonal allergic rhinitis        Past Surgical History:   Procedure Laterality Date    HX CYST REMOVAL  1983    large cyst removed on left shoulder    IR OCCL TXCATH ARTERY NON HEMMORAGE W SI  4/18/2022    IR OCCL TXCATH ORGAN W SI  5/3/2022       Family History   Problem Relation Age of Onset    Emphysema Mother     Arthritis-rheumatoid Mother     Emphysema Father Arthritis-rheumatoid Father     Cancer Sister 35        breast    Liver Disease Brother     Thyroid Disease Brother        Social History     Socioeconomic History    Marital status:      Spouse name: Not on file    Number of children: Not on file    Years of education: Not on file    Highest education level: Not on file   Occupational History    Not on file   Tobacco Use    Smoking status: Every Day     Packs/day: 1.00     Years: 25.00     Pack years: 25.00     Types: Cigarettes    Smokeless tobacco: Never   Vaping Use    Vaping Use: Not on file   Substance and Sexual Activity    Alcohol use: Yes     Comment: occasionally    Drug use: Not Currently     Types: OTC, Prescription    Sexual activity: Yes     Partners: Female   Other Topics Concern    Not on file   Social History Narrative    Not on file     Social Determinants of Health     Financial Resource Strain: Not on file   Food Insecurity: Not on file   Transportation Needs: Not on file   Physical Activity: Not on file   Stress: Not on file   Social Connections: Not on file   Intimate Partner Violence: Not on file   Housing Stability: Not on file       Allergies   Allergen Reactions    Lisinopril Anaphylaxis     Patient experienced throat swelling and respiratory distress as a result. Current Outpatient Medications on File Prior to Visit   Medication Sig Dispense Refill    hydroCHLOROthiazide (HYDRODIURIL) 12.5 mg tablet Take 12.5 mg by mouth daily. hydrALAZINE (APRESOLINE) 10 mg tablet Take 10 mg by mouth two (2) times a day. cetirizine (ZYRTEC) 10 mg tablet       indomethacin (INDOCIN) 50 mg capsule       ondansetron hcl (ZOFRAN) 8 mg tablet       fluticasone propionate (FLONASE) 50 mcg/actuation nasal spray One spray in each nostril once a day at bedtime 1 Each 11    allopurinoL (ZYLOPRIM) 300 mg tablet Take 1 Tablet by mouth daily. 90 Tablet 0    amLODIPine (NORVASC) 10 mg tablet Take 1 Tablet by mouth daily.  Indications: high blood pressure 90 Tablet 1    baclofen 5 mg tab TAKE ONE TABLET BY MOUTH THREE TIMES A DAY AS NEEDED FOR HICCUPS 90 Tablet 0    FeroSuL 325 mg (65 mg iron) tablet Take one tablet by mouth daily 90 Tablet 1    doxycycline (MONODOX) 100 mg capsule Take 100 mg by mouth two (2) times a day. capecitabine (XELODA) 500 mg tablet  (Patient not taking: No sig reported)      [DISCONTINUED] sildenafil citrate (VIAGRA) 100 mg tablet Take 1 Tablet by mouth daily as needed for Erectile Dysfunction. 30 Tablet 3     Current Facility-Administered Medications on File Prior to Visit   Medication Dose Route Frequency Provider Last Rate Last Admin    [COMPLETED] gadobenate dimeglumine (MULTIHANCE) 529 mg/mL (0.1mmol/0.2mL) contrast injection 20 mL  20 mL IntraVENous RAD ONCE Jesus Francois MD   20 mL at 11/25/22 9621        Review of Systems   Constitutional: Negative. HENT: Negative. Eyes: Negative. Respiratory: Negative. Cardiovascular: Negative. Gastrointestinal: Negative. Genitourinary: Negative. Musculoskeletal: Negative. Skin: Negative. Neurological: Negative. Endo/Heme/Allergies: Negative. Psychiatric/Behavioral: Negative. Objective:     Vitals:    11/25/22 0927 11/25/22 0932   BP: (!) 145/86 138/89   Pulse: 64    Resp: 18    Temp: 98.2 °F (36.8 °C)    TempSrc: Temporal    Weight: 186 lb (84.4 kg)    Height: 6' (1.829 m)       Physical Exam  Constitutional:       Appearance: Normal appearance. HENT:      Head: Normocephalic and atraumatic. Nose: Nose normal.      Mouth/Throat:      Mouth: Mucous membranes are moist.   Eyes:      Pupils: Pupils are equal, round, and reactive to light. Cardiovascular:      Rate and Rhythm: Normal rate and regular rhythm. Pulses: Normal pulses. Heart sounds: Normal heart sounds. Pulmonary:      Effort: Pulmonary effort is normal.      Breath sounds: Normal breath sounds. Abdominal:      General: Abdomen is flat.       Palpations: Abdomen is soft. Musculoskeletal:         General: Normal range of motion. Cervical back: Normal range of motion and neck supple. Skin:     General: Skin is warm and dry. Neurological:      General: No focal deficit present. Mental Status: He is alert. Mental status is at baseline. Psychiatric:         Mood and Affect: Mood normal.         Behavior: Behavior normal.          Labs:     Results for orders placed or performed during the hospital encounter of 09/12/22   CREATININE, POC   Result Value Ref Range    Creatinine, POC 0.8 0.6 - 1.3 MG/DL    GFRAA, POC >60 >60 ml/min/1.73m2    GFRNA, POC >60 >60 ml/min/1.73m2          Active Problems:     Patient Active Problem List    Diagnosis    Erectile dysfunction    Intractable hiccups    Gout    Cervicalgia    Cervical spondylosis without myelopathy    DDD (degenerative disc disease), cervical    Rheumatoid arthritis involving multiple sites ([facility]    Anxiety    Hyperuricemia    HTN (hypertension)    Current smoker    Alcoholism ([facility]    Allergic rhinitis    Depression    [facility] ([facility]       Assessment & Plan:     Diagnoses and all orders for this visit:    1. Encounter to establish care    2. Primary hypertension  Assessment & Plan:   Amlodipine and hydralazine. Orders:  -     CBC WITH AUTOMATED DIFF; Future  -     METABOLIC PANEL, COMPREHENSIVE; Future  -     LIPID PANEL; Future    3. DDD (degenerative disc disease), cervical    4. Hyperuricemia  Assessment & Plan: On allopurinol      5. Rheumatoid arthritis, involving unspecified site, unspecified whether rheumatoid factor present [facility])  Assessment & Plan:   Follows with rheumatology. Indomethacin for pain      6. Erectile dysfunction, unspecified erectile dysfunction type  -     sildenafil citrate (VIAGRA) 100 mg tablet; Take 1 Tablet by mouth daily as needed for Erectile Dysfunction for up to 30 days.     7. Screen for colon cancer  Assessment & Plan:   Patient already scheduled for colonoscopy with GI.      8. Screening for lung cancer  -     CT LOW DOSE LUNG CANCER SCREENING; Future    9. Screening for prostate cancer  -     PSA SCREENING (SCREENING); Future    10. Seasonal allergic rhinitis due to pollen  Assessment & Plan:   Stable on Flonase, cetirizine. 11. Cholangiocarcinoma ([facility]  Assessment & Plan: On chemotherapy, following with Dr. Narayan Burris. Follow-up and Dispositions    Return in about 3 months (around 2/25/2023). Disclaimer: The patient understands our medical plan. Alternatives have been explained and offered. The risks, benefits and significant side effects of all medications have been reviewed. Anticipated time course and progression of condition reviewed. All questions have been addressed. He is encouraged to employ the information provided in the after visit summary, which was reviewed. Where applicable, he is instructed to call the clinic if he has not been notified either by phone or through [address] with the results of his tests or with an appointment plan for any referrals within 1 week(s). No news is not good news; it's no news. The patient  is to call if his condition worsens or fails to improve or if significant side effects are experienced.            Thien Baugh MD

## 2022-11-25 NOTE — PROGRESS NOTES
Lynn Sampson presents today for   Chief Complaint   Patient presents with    Establish Care       1. \"Have you been to the ER, urgent care clinic since your last visit? Hospitalized since your last visit? \" no    2. \"Have you seen or consulted any other health care providers outside of the [address] [place] [address] since your last visit? \" no     3. For patients aged 39-70: Has the patient had a colonoscopy / FIT/ Cologuard? Yes - no Care Gap present      If the patient is female:    4. For patients aged 41-77: Has the patient had a mammogram within the past 2 years? NA - based on age or sex  See top three    5. For patients aged 21-65: Has the patient had a pap smear?  NA - based on age or sex

## 2022-11-26 DIAGNOSIS — Z12.2 SCREENING FOR LUNG CANCER: ICD-10-CM

## 2022-11-26 LAB
ALBUMIN SERPL-MCNC: 4.5 G/DL (ref 3.8–4.9)
ALBUMIN/GLOB SERPL: 1.5 {RATIO} (ref 1.2–2.2)
ALP SERPL-CCNC: 121 IU/L (ref 44–121)
ALT SERPL-CCNC: 20 IU/L (ref 0–44)
AST SERPL-CCNC: 28 IU/L (ref 0–40)
BASOPHILS # BLD AUTO: 0 X10E3/UL (ref 0–0.2)
BASOPHILS NFR BLD AUTO: 1 %
BILIRUB SERPL-MCNC: 0.7 MG/DL (ref 0–1.2)
BUN SERPL-MCNC: 15 MG/DL (ref 6–24)
BUN/CREAT SERPL: 17 (ref 9–20)
CALCIUM SERPL-MCNC: 9.6 MG/DL (ref 8.7–10.2)
CHLORIDE SERPL-SCNC: 101 MMOL/L (ref 96–106)
CHOLEST SERPL-MCNC: 160 MG/DL (ref 100–199)
CO2 SERPL-SCNC: 22 MMOL/L (ref 20–29)
CREAT SERPL-MCNC: 0.88 MG/DL (ref 0.76–1.27)
EGFR: 99 ML/MIN/1.73
EOSINOPHIL # BLD AUTO: 0 X10E3/UL (ref 0–0.4)
EOSINOPHIL NFR BLD AUTO: 0 %
ERYTHROCYTE [DISTWIDTH] IN BLOOD BY AUTOMATED COUNT: 21.1 % (ref 11.6–15.4)
GLOBULIN SER CALC-MCNC: 3.1 G/DL (ref 1.5–4.5)
GLUCOSE SERPL-MCNC: 95 MG/DL (ref 70–99)
HCT VFR BLD AUTO: 30.6 % (ref 37.5–51)
HDLC SERPL-MCNC: 65 MG/DL
HGB BLD-MCNC: 10.2 G/DL (ref 13–17.7)
IMM GRANULOCYTES # BLD AUTO: 0 X10E3/UL (ref 0–0.1)
IMM GRANULOCYTES NFR BLD AUTO: 0 %
IMP & REVIEW OF LAB RESULTS: NORMAL
LDLC SERPL CALC-MCNC: 80 MG/DL (ref 0–99)
LYMPHOCYTES # BLD AUTO: 1.1 X10E3/UL (ref 0.7–3.1)
LYMPHOCYTES NFR BLD AUTO: 34 %
MCH RBC QN AUTO: 31.1 PG (ref 26.6–33)
MCHC RBC AUTO-ENTMCNC: 33.3 G/DL (ref 31.5–35.7)
MCV RBC AUTO: 93 FL (ref 79–97)
MONOCYTES # BLD AUTO: 0.8 X10E3/UL (ref 0.1–0.9)
MONOCYTES NFR BLD AUTO: 25 %
MORPHOLOGY BLD-IMP: ABNORMAL
NEUTROPHILS # BLD AUTO: 1.3 X10E3/UL (ref 1.4–7)
NEUTROPHILS NFR BLD AUTO: 40 %
PLATELET # BLD AUTO: 226 X10E3/UL (ref 150–450)
POTASSIUM SERPL-SCNC: 3.9 MMOL/L (ref 3.5–5.2)
PROT SERPL-MCNC: 7.6 G/DL (ref 6–8.5)
PSA SERPL-MCNC: 0.9 NG/ML (ref 0–4)
RBC # BLD AUTO: 3.28 X10E6/UL (ref 4.14–5.8)
SODIUM SERPL-SCNC: 138 MMOL/L (ref 134–144)
TRIGL SERPL-MCNC: 79 MG/DL (ref 0–149)
VLDLC SERPL CALC-MCNC: 15 MG/DL (ref 5–40)
WBC # BLD AUTO: 3.1 X10E3/UL (ref 3.4–10.8)

## 2022-11-28 LAB — CREAT UR-MCNC: 1 MG/DL (ref 0.6–1.3)

## 2022-11-28 RX ORDER — SILDENAFIL 100 MG/1
100 TABLET, FILM COATED ORAL
Qty: 30 TABLET | Refills: 0 | OUTPATIENT
Start: 2022-11-28

## 2022-11-29 ENCOUNTER — TELEPHONE (OUTPATIENT)
Dept: INTERNAL MEDICINE CLINIC | Age: 57
End: 2022-11-29

## 2022-11-29 NOTE — TELEPHONE ENCOUNTER
----- Message from Marc Brand MD sent at 11/29/2022 11:30 AM EST -----  Please inform the patient that labs are within acceptable range.   Thanks

## 2022-12-06 ENCOUNTER — HOSPITAL ENCOUNTER (OUTPATIENT)
Age: 57
End: 2022-12-06
Attending: INTERNAL MEDICINE
Payer: MEDICAID

## 2022-12-12 ENCOUNTER — TRANSCRIBE ORDER (OUTPATIENT)
Dept: SCHEDULING | Age: 57
End: 2022-12-12

## 2022-12-12 DIAGNOSIS — C24.8: Primary | ICD-10-CM

## 2022-12-14 ENCOUNTER — HOSPITAL ENCOUNTER (OUTPATIENT)
Dept: RADIATION THERAPY | Age: 57
Discharge: HOME | End: 2022-12-14
Payer: MEDICAID

## 2022-12-14 PROCEDURE — 99205 OFFICE O/P NEW HI 60 MIN: CPT | Performed by: RADIOLOGY

## 2022-12-14 PROCEDURE — 99211 OFF/OP EST MAY X REQ PHY/QHP: CPT

## 2022-12-20 ENCOUNTER — HOSPITAL ENCOUNTER (OUTPATIENT)
Dept: CT IMAGING | Age: 57
Discharge: HOME | End: 2022-12-20
Attending: INTERNAL MEDICINE
Payer: MEDICAID

## 2022-12-20 DIAGNOSIS — C24.8: ICD-10-CM

## 2022-12-20 PROCEDURE — 71250 CT THORAX DX C-: CPT

## 2022-12-25 PROBLEM — Z12.11 SCREEN FOR COLON CANCER: Status: RESOLVED | Noted: 2022-11-25 | Resolved: 2022-12-25

## 2022-12-25 PROBLEM — Z12.5 SCREENING FOR PROSTATE CANCER: Status: RESOLVED | Noted: 2022-11-25 | Resolved: 2022-12-25

## 2022-12-25 PROBLEM — Z12.2 SCREENING FOR LUNG CANCER: Status: RESOLVED | Noted: 2022-11-25 | Resolved: 2022-12-25

## 2023-01-10 ENCOUNTER — HOSPITAL ENCOUNTER (OUTPATIENT)
Dept: CT IMAGING | Age: 58
Discharge: HOME | End: 2023-01-10
Attending: INTERNAL MEDICINE
Payer: MEDICAID

## 2023-01-10 PROCEDURE — 71271 CT THORAX LUNG CANCER SCR C-: CPT

## 2023-01-13 DIAGNOSIS — Z12.2 SCREENING FOR LUNG CANCER: Primary | ICD-10-CM

## 2023-01-13 DIAGNOSIS — F17.200 SMOKER: ICD-10-CM

## 2023-01-13 DIAGNOSIS — Z87.891 EX-SMOKER: ICD-10-CM

## 2023-02-17 PROBLEM — Z12.2 SCREENING FOR LUNG CANCER: Status: RESOLVED | Noted: 2022-11-25 | Resolved: 2023-02-17

## 2023-02-17 PROBLEM — R06.6 INTRACTABLE HICCUPS: Status: RESOLVED | Noted: 2021-09-28 | Resolved: 2023-02-17

## 2023-02-17 PROBLEM — Z76.89 ENCOUNTER TO ESTABLISH CARE: Status: RESOLVED | Noted: 2022-11-25 | Resolved: 2023-02-17

## 2023-02-17 PROBLEM — M06.9 RHEUMATOID ARTHRITIS INVOLVING MULTIPLE SITES (HCC): Status: RESOLVED | Noted: 2018-06-06 | Resolved: 2023-02-17

## 2023-03-01 ENCOUNTER — OFFICE VISIT (OUTPATIENT)
Age: 58
End: 2023-03-01

## 2023-03-01 VITALS
WEIGHT: 197 LBS | RESPIRATION RATE: 18 BRPM | HEIGHT: 72 IN | BODY MASS INDEX: 26.68 KG/M2 | DIASTOLIC BLOOD PRESSURE: 72 MMHG | OXYGEN SATURATION: 100 % | TEMPERATURE: 98.3 F | HEART RATE: 66 BPM | SYSTOLIC BLOOD PRESSURE: 137 MMHG

## 2023-03-01 DIAGNOSIS — C22.1 CHOLANGIOCARCINOMA (HCC): ICD-10-CM

## 2023-03-01 DIAGNOSIS — D64.9 ANEMIA, UNSPECIFIED TYPE: ICD-10-CM

## 2023-03-01 DIAGNOSIS — E79.0 HYPERURICEMIA WITHOUT SIGNS OF INFLAMMATORY ARTHRITIS AND TOPHACEOUS DISEASE: ICD-10-CM

## 2023-03-01 DIAGNOSIS — M10.9 GOUT, UNSPECIFIED CAUSE, UNSPECIFIED CHRONICITY, UNSPECIFIED SITE: ICD-10-CM

## 2023-03-01 DIAGNOSIS — Z12.11 SCREEN FOR COLON CANCER: ICD-10-CM

## 2023-03-01 DIAGNOSIS — N52.9 ERECTILE DYSFUNCTION, UNSPECIFIED ERECTILE DYSFUNCTION TYPE: ICD-10-CM

## 2023-03-01 DIAGNOSIS — M06.9 RHEUMATOID ARTHRITIS, INVOLVING UNSPECIFIED SITE, UNSPECIFIED WHETHER RHEUMATOID FACTOR PRESENT (HCC): ICD-10-CM

## 2023-03-01 DIAGNOSIS — I10 ESSENTIAL (PRIMARY) HYPERTENSION: Primary | ICD-10-CM

## 2023-03-01 DIAGNOSIS — J30.2 SEASONAL ALLERGIES: ICD-10-CM

## 2023-03-01 RX ORDER — INDOMETHACIN 50 MG/1
50 CAPSULE ORAL 3 TIMES DAILY PRN
Qty: 90 CAPSULE | Refills: 0 | Status: SHIPPED | OUTPATIENT
Start: 2023-03-01 | End: 2023-03-31

## 2023-03-01 RX ORDER — AMLODIPINE BESYLATE 10 MG/1
10 TABLET ORAL DAILY
Qty: 90 TABLET | Refills: 1 | Status: SHIPPED | OUTPATIENT
Start: 2023-03-01 | End: 2023-05-30

## 2023-03-01 RX ORDER — CETIRIZINE HYDROCHLORIDE 10 MG/1
10 TABLET ORAL DAILY
Qty: 30 TABLET | Refills: 0 | Status: SHIPPED | OUTPATIENT
Start: 2023-03-01 | End: 2023-03-31

## 2023-03-01 RX ORDER — SILDENAFIL 100 MG/1
100 TABLET, FILM COATED ORAL DAILY PRN
Qty: 90 TABLET | Refills: 0 | Status: SHIPPED | OUTPATIENT
Start: 2023-03-01 | End: 2023-05-30

## 2023-03-01 SDOH — ECONOMIC STABILITY: FOOD INSECURITY: WITHIN THE PAST 12 MONTHS, YOU WORRIED THAT YOUR FOOD WOULD RUN OUT BEFORE YOU GOT MONEY TO BUY MORE.: NEVER TRUE

## 2023-03-01 SDOH — ECONOMIC STABILITY: HOUSING INSECURITY
IN THE LAST 12 MONTHS, WAS THERE A TIME WHEN YOU DID NOT HAVE A STEADY PLACE TO SLEEP OR SLEPT IN A SHELTER (INCLUDING NOW)?: NO

## 2023-03-01 SDOH — ECONOMIC STABILITY: FOOD INSECURITY: WITHIN THE PAST 12 MONTHS, THE FOOD YOU BOUGHT JUST DIDN'T LAST AND YOU DIDN'T HAVE MONEY TO GET MORE.: NEVER TRUE

## 2023-03-01 SDOH — ECONOMIC STABILITY: INCOME INSECURITY: HOW HARD IS IT FOR YOU TO PAY FOR THE VERY BASICS LIKE FOOD, HOUSING, MEDICAL CARE, AND HEATING?: NOT HARD AT ALL

## 2023-03-01 ASSESSMENT — PATIENT HEALTH QUESTIONNAIRE - PHQ9
SUM OF ALL RESPONSES TO PHQ QUESTIONS 1-9: 0
SUM OF ALL RESPONSES TO PHQ9 QUESTIONS 1 & 2: 0
SUM OF ALL RESPONSES TO PHQ QUESTIONS 1-9: 0
1. LITTLE INTEREST OR PLEASURE IN DOING THINGS: 0
SUM OF ALL RESPONSES TO PHQ QUESTIONS 1-9: 0
SUM OF ALL RESPONSES TO PHQ QUESTIONS 1-9: 0
2. FEELING DOWN, DEPRESSED OR HOPELESS: 0

## 2023-03-01 ASSESSMENT — ENCOUNTER SYMPTOMS
ALLERGIC/IMMUNOLOGIC NEGATIVE: 1
GASTROINTESTINAL NEGATIVE: 1
RESPIRATORY NEGATIVE: 1
EYES NEGATIVE: 1

## 2023-03-01 NOTE — ASSESSMENT & PLAN NOTE
Patient on allopurinol. To start on indomethacin. Check uric acid level.   To follow with rheumatologist

## 2023-03-01 NOTE — PROGRESS NOTES
Leila Yu presents today for   Chief Complaint   Patient presents with    Follow-up    Hypertension                 1. \"Have you been to the ER, urgent care clinic since your last visit? Hospitalized since your last visit? \" no    2. \"Have you seen or consulted any other health care providers outside of the [address] [place] [address] since your last visit? \" no     3. For patients aged 39-70: Has the patient had a colonoscopy / FIT/ Cologuard? Yes - no Care Gap present      If the patient is female:    4. For patients aged 41-77: Has the patient had a mammogram within the past 2 years? NA - based on age or sex      11. For patients aged 21-65: Has the patient had a pap smear?  NA - based on age or sex

## 2023-03-01 NOTE — PROGRESS NOTES
Kimberley Bee (: 1965) is a 59 y.o. male, here for evaluation of the following chief complaint(s):  Follow-up and Hypertension       ASSESSMENT/PLAN:  Below is the assessment and plan developed based on review of pertinent history, physical exam, labs, studies, and medications. 1. Essential (primary) hypertension  -     amLODIPine (NORVASC) 10 MG tablet; Take 1 tablet by mouth daily, Disp-90 tablet, R-1Normal  2. Erectile dysfunction, unspecified erectile dysfunction type  -     sildenafil (VIAGRA) 100 MG tablet; Take 1 tablet by mouth daily as needed for Erectile Dysfunction, Disp-90 tablet, R-0Normal  3. Rheumatoid arthritis, involving unspecified site, unspecified whether rheumatoid factor present [facility])  Assessment & Plan:   Awaiting follow-up with rheumatologist.    4. Hyperuricemia without signs of inflammatory arthritis and tophaceous disease  -     indomethacin (INDOCIN) 50 MG capsule; Take 1 capsule by mouth 3 times daily as needed for Pain ceived the following from [facility]: Outside name: indomethacin (INDOCIN) 50 mg capsule, Disp-90 capsule, R-0Normal  -     Uric Acid; Future  5. Anemia, unspecified type  Assessment & Plan: On iron supplements. To recheck hemoglobin  Orders:  -     CBC with Auto Differential; Future  -     Iron and TIBC; Future  6. Seasonal allergies  Assessment & Plan: On Flonase. To add Zyrtec. Orders:  -     cetirizine (ZYRTEC) 10 MG tablet; Take 1 tablet by mouth daily, Disp-30 tablet, R-0Normal  7. Gout, unspecified cause, unspecified chronicity, unspecified site  Assessment & Plan:  Patient on allopurinol. To start on indomethacin. Check uric acid level. To follow with rheumatologist  8. Screen for colon cancer  Assessment & Plan:   Colonoscopy scheduled for   9. Cholangiocarcinoma [facility])  Assessment & Plan:   Follows with Dr. Myla Mtz. Next appointment .       Return in about 1 week (around 3/8/2023) for LABS TODAY, follow up on chronic conditions. SUBJECTIVE/OBJECTIVE:  HPI  Patient is complaining of pain in the right foot dorsally. Patient has had similar pain in the past as gout flare. Patient is already on allopurinol. The pain is worse on movement. Patient is requesting for anti-inflammatory medications. Patient had not been taking indomethacin. Patient follows with rheumatology for rheumatoid arthritis. Patient is scheduled for colonoscopy in June 23. Reviewed labs with the patient PSA is normal.   Patient had low hemoglobin and low WBC count, was started on iron supplements, repeat CBC. Low-dose lung CT is negative for lung nodules. Recommend repeating in 1 year. Patient is using Flonase for allergies patient had not been taking Zyrtec. Patient continues to feel nasal congestion and is requesting for some medication for help. Review of Systems   Constitutional: Negative. Negative for activity change. HENT: Negative. Eyes: Negative. Respiratory: Negative. Cardiovascular: Negative. Gastrointestinal: Negative. Endocrine: Negative. Genitourinary: Negative. Musculoskeletal:  Positive for arthralgias. Right foot pain   Skin: Negative. Allergic/Immunologic: Negative. Neurological: Negative. Hematological: Negative. Psychiatric/Behavioral: Negative. Vitals:    03/01/23 0857   BP: 137/72   Pulse: 66   Resp: 18   Temp: 98.3 °F (36.8 °C)   TempSrc: Temporal   SpO2: 100%   Weight: 197 lb (89.4 kg)   Height: 6' (1.829 m)     Physical Exam  Constitutional:       Appearance: Normal appearance. HENT:      Head: Normocephalic and atraumatic. Nose: Nose normal.      Mouth/Throat:      Mouth: Mucous membranes are moist.   Eyes:      Extraocular Movements: Extraocular movements intact. Pupils: Pupils are equal, round, and reactive to light. Cardiovascular:      Rate and Rhythm: Normal rate and regular rhythm. Heart sounds: Normal heart sounds.    Pulmonary: Breath sounds: Normal breath sounds. Abdominal:      General: Abdomen is flat. Palpations: Abdomen is soft. Musculoskeletal:         General: Normal range of motion. Cervical back: Normal range of motion and neck supple. Comments: Mild tenderness in right foot on movement. Skin:     General: Skin is warm. Neurological:      General: No focal deficit present. Mental Status: He is alert. Mental status is at baseline. We discussed the diagnosis, risks and benefits of medications    Disclaimer: The patient understands our medical plan. Alternatives have been explained and offered. The risks, benefits and significant side effects of all medications have been reviewed. Anticipated time course and progression of condition reviewed. All questions have been addressed. He is encouraged to employ the information provided in the after visit summary, which was reviewed. Where appropriate, he is instructed to call the clinic if he has not been notified either by phone or through [address] with the results of his tests or with an appointment plan for any referrals within 1 week(s). No news is not good news; it's no news. The patient  is to call if his condition worsens or fails to improve or if significant side effects are experienced. Aspects of this note may have been generated using voice recognition software. Despite editing, there may be unrecognized errors. An electronic signature was used to authenticate this note.   -- Espinoza Jordan MD

## 2023-03-02 LAB
BASOPHILS # BLD AUTO: 0 X10E3/UL (ref 0–0.2)
BASOPHILS NFR BLD AUTO: 0 %
EOSINOPHIL # BLD AUTO: 0.1 X10E3/UL (ref 0–0.4)
EOSINOPHIL NFR BLD AUTO: 1 %
ERYTHROCYTE [DISTWIDTH] IN BLOOD BY AUTOMATED COUNT: 16 % (ref 11.6–15.4)
HCT VFR BLD AUTO: 38.7 % (ref 37.5–51)
HGB BLD-MCNC: 13 G/DL (ref 13–17.7)
IMM GRANULOCYTES # BLD AUTO: 0 X10E3/UL (ref 0–0.1)
IMM GRANULOCYTES NFR BLD AUTO: 0 %
IRON SATN MFR SERPL: 25 % (ref 15–55)
IRON SERPL-MCNC: 71 UG/DL (ref 38–169)
LYMPHOCYTES # BLD AUTO: 1.3 X10E3/UL (ref 0.7–3.1)
LYMPHOCYTES NFR BLD AUTO: 22 %
MCH RBC QN AUTO: 27.4 PG (ref 26.6–33)
MCHC RBC AUTO-ENTMCNC: 33.6 G/DL (ref 31.5–35.7)
MCV RBC AUTO: 82 FL (ref 79–97)
MONOCYTES # BLD AUTO: 0.7 X10E3/UL (ref 0.1–0.9)
MONOCYTES NFR BLD AUTO: 12 %
NEUTROPHILS # BLD AUTO: 3.8 X10E3/UL (ref 1.4–7)
NEUTROPHILS NFR BLD AUTO: 65 %
PLATELET # BLD AUTO: 156 X10E3/UL (ref 150–450)
RBC # BLD AUTO: 4.74 X10E6/UL (ref 4.14–5.8)
TIBC SERPL-MCNC: 279 UG/DL (ref 250–450)
UIBC SERPL-MCNC: 208 UG/DL (ref 111–343)
URATE SERPL-MCNC: 5.2 MG/DL (ref 3.8–8.4)
WBC # BLD AUTO: 5.9 X10E3/UL (ref 3.4–10.8)

## 2023-03-08 ENCOUNTER — OFFICE VISIT (OUTPATIENT)
Age: 58
End: 2023-03-08
Payer: MEDICAID

## 2023-03-08 VITALS
OXYGEN SATURATION: 100 % | BODY MASS INDEX: 26.68 KG/M2 | HEIGHT: 72 IN | TEMPERATURE: 97.6 F | WEIGHT: 197 LBS | HEART RATE: 62 BPM | RESPIRATION RATE: 18 BRPM | SYSTOLIC BLOOD PRESSURE: 115 MMHG | DIASTOLIC BLOOD PRESSURE: 85 MMHG

## 2023-03-08 DIAGNOSIS — J30.2 SEASONAL ALLERGIES: ICD-10-CM

## 2023-03-08 DIAGNOSIS — Z12.11 SCREEN FOR COLON CANCER: ICD-10-CM

## 2023-03-08 DIAGNOSIS — M10.9 GOUT, UNSPECIFIED CAUSE, UNSPECIFIED CHRONICITY, UNSPECIFIED SITE: Primary | ICD-10-CM

## 2023-03-08 DIAGNOSIS — M06.9 RHEUMATOID ARTHRITIS, INVOLVING UNSPECIFIED SITE, UNSPECIFIED WHETHER RHEUMATOID FACTOR PRESENT (HCC): ICD-10-CM

## 2023-03-08 DIAGNOSIS — I10 ESSENTIAL (PRIMARY) HYPERTENSION: ICD-10-CM

## 2023-03-08 PROCEDURE — 3074F SYST BP LT 130 MM HG: CPT | Performed by: INTERNAL MEDICINE

## 2023-03-08 PROCEDURE — 99213 OFFICE O/P EST LOW 20 MIN: CPT | Performed by: INTERNAL MEDICINE

## 2023-03-08 PROCEDURE — 3079F DIAST BP 80-89 MM HG: CPT | Performed by: INTERNAL MEDICINE

## 2023-03-08 SDOH — ECONOMIC STABILITY: FOOD INSECURITY: WITHIN THE PAST 12 MONTHS, YOU WORRIED THAT YOUR FOOD WOULD RUN OUT BEFORE YOU GOT MONEY TO BUY MORE.: NEVER TRUE

## 2023-03-08 SDOH — ECONOMIC STABILITY: FOOD INSECURITY: WITHIN THE PAST 12 MONTHS, THE FOOD YOU BOUGHT JUST DIDN'T LAST AND YOU DIDN'T HAVE MONEY TO GET MORE.: NEVER TRUE

## 2023-03-08 SDOH — ECONOMIC STABILITY: INCOME INSECURITY: HOW HARD IS IT FOR YOU TO PAY FOR THE VERY BASICS LIKE FOOD, HOUSING, MEDICAL CARE, AND HEATING?: NOT HARD AT ALL

## 2023-03-08 ASSESSMENT — ENCOUNTER SYMPTOMS
ALLERGIC/IMMUNOLOGIC NEGATIVE: 1
EYES NEGATIVE: 1
RESPIRATORY NEGATIVE: 1
GASTROINTESTINAL NEGATIVE: 1

## 2023-03-08 ASSESSMENT — PATIENT HEALTH QUESTIONNAIRE - PHQ9
SUM OF ALL RESPONSES TO PHQ QUESTIONS 1-9: 0
2. FEELING DOWN, DEPRESSED OR HOPELESS: 0
SUM OF ALL RESPONSES TO PHQ QUESTIONS 1-9: 0
SUM OF ALL RESPONSES TO PHQ9 QUESTIONS 1 & 2: 0
1. LITTLE INTEREST OR PLEASURE IN DOING THINGS: 0

## 2023-03-08 NOTE — PROGRESS NOTES
Jacki Metcalf presents today for   Chief Complaint   Patient presents with    Follow-up    Hypertension                 1. \"Have you been to the ER, urgent care clinic since your last visit? Hospitalized since your last visit? \" no    2. \"Have you seen or consulted any other health care providers outside of the [address] [place] [address] since your last visit? \" no     3. For patients aged 39-70: Has the patient had a colonoscopy / FIT/ Cologuard? Yes - Care Gap present. Rooming MA/LPN to request most recent results      If the patient is female:    4. For patients aged 41-77: Has the patient had a mammogram within the past 2 years? NA - based on age or sex      11. For patients aged 21-65: Has the patient had a pap smear?  NA - based on age or sex

## 2023-03-08 NOTE — PROGRESS NOTES
Tiara Javed (: 1965) is a 59 y.o. male, here for evaluation of the following chief complaint(s):  Follow-up and Hypertension       ASSESSMENT/PLAN:  Below is the assessment and plan developed based on review of pertinent history, physical exam, labs, studies, and medications. 1. Gout, unspecified cause, unspecified chronicity, unspecified site  Assessment & Plan:   Uric acid level normal.  Stable on allopurinol  2. Essential (primary) hypertension  Assessment & Plan:   Blood pressure seems stable on amlodipine and hydralazine. Patient is suggested to keep a log of blood pressure at home and let us know if blood pressure goes beyond 140/90.  3. Rheumatoid arthritis, involving unspecified site, unspecified whether rheumatoid factor present [facility])  Assessment & Plan:   Follow-up with rheumatologist    4. Seasonal allergies  Assessment & Plan:   Stable on Zyrtec and Flonase    5. Screen for colon cancer  Assessment & Plan:   Colonoscopy . Return in about 1 month (around 2023) for follow up on chronic conditions. SUBJECTIVE/OBJECTIVE:  Hypertension    Patient has been watching his blood pressure at home and states that today morning it was mildly elevated in the range of 130s. No other complaints or concerns. Patient is watching his diet. Reviewed labs with the patient iron levels have improved with iron supplements. Hemoglobin has improved. Uric acid level is normal.    Patient taking Zyrtec and Flonase for allergies, stable. Colonoscopy       Review of Systems   Constitutional: Negative. Negative for activity change. HENT: Negative. Eyes: Negative. Respiratory: Negative. Cardiovascular: Negative. Gastrointestinal: Negative. Endocrine: Negative. Genitourinary: Negative. Musculoskeletal: Negative. Skin: Negative. Allergic/Immunologic: Negative. Neurological: Negative. Hematological: Negative. Psychiatric/Behavioral: Negative. Vitals:    03/08/23 0830 03/08/23 0831   BP: (!) 158/81 115/85   Pulse: 62    Resp: 18    Temp: 97.6 °F (36.4 °C)    TempSrc: Temporal    SpO2: 100%    Weight: 197 lb (89.4 kg)    Height: 6' (1.829 m)      Physical Exam  Constitutional:       Appearance: Normal appearance. HENT:      Head: Normocephalic and atraumatic. Nose: Nose normal.      Mouth/Throat:      Mouth: Mucous membranes are moist.   Eyes:      Extraocular Movements: Extraocular movements intact. Pupils: Pupils are equal, round, and reactive to light. Cardiovascular:      Rate and Rhythm: Normal rate and regular rhythm. Heart sounds: Normal heart sounds. Pulmonary:      Breath sounds: Normal breath sounds. Abdominal:      General: Abdomen is flat. Palpations: Abdomen is soft. Musculoskeletal:         General: Normal range of motion. Cervical back: Normal range of motion and neck supple. Skin:     General: Skin is warm. Neurological:      General: No focal deficit present. Mental Status: He is alert. Mental status is at baseline. We discussed the diagnosis, risks and benefits of medications    Disclaimer: The patient understands our medical plan. Alternatives have been explained and offered. The risks, benefits and significant side effects of all medications have been reviewed. Anticipated time course and progression of condition reviewed. All questions have been addressed. He is encouraged to employ the information provided in the after visit summary, which was reviewed. Where appropriate, he is instructed to call the clinic if he has not been notified either by phone or through [address] with the results of his tests or with an appointment plan for any referrals within 1 week(s). No news is not good news; it's no news. The patient  is to call if his condition worsens or fails to improve or if significant side effects are experienced.      Aspects of this note may have been generated using voice recognition software. Despite editing, there may be unrecognized errors. An electronic signature was used to authenticate this note.   -- Vida Coffey MD

## 2023-03-08 NOTE — ASSESSMENT & PLAN NOTE
Blood pressure seems stable on amlodipine and hydralazine. Patient is suggested to keep a log of blood pressure at home and let us know if blood pressure goes beyond 140/90.

## 2023-03-31 PROBLEM — Z12.11 SCREEN FOR COLON CANCER: Status: RESOLVED | Noted: 2022-11-25 | Resolved: 2023-03-31

## 2023-04-05 ENCOUNTER — APPOINTMENT (OUTPATIENT)
Facility: HOSPITAL | Age: 58
End: 2023-04-05
Payer: MEDICAID

## 2023-04-05 DIAGNOSIS — J30.2 SEASONAL ALLERGIES: ICD-10-CM

## 2023-04-06 RX ORDER — CETIRIZINE HYDROCHLORIDE 10 MG/1
TABLET ORAL
Qty: 30 TABLET | Refills: 0 | Status: SHIPPED | OUTPATIENT
Start: 2023-04-06

## 2023-04-07 ENCOUNTER — HOSPITAL ENCOUNTER (OUTPATIENT)
Facility: HOSPITAL | Age: 58
End: 2023-04-07
Payer: MEDICAID

## 2023-04-07 DIAGNOSIS — C22.1 CHOLANGIOCARCINOMA (HCC): ICD-10-CM

## 2023-04-07 DIAGNOSIS — C24.8 CARCINOMA OF GALLBLADDER AND EXTRAHEPATIC BILE DUCTS (HCC): ICD-10-CM

## 2023-04-07 LAB — CREAT UR-MCNC: 0.9 MG/DL (ref 0.6–1.3)

## 2023-04-07 PROCEDURE — 74183 MRI ABD W/O CNTR FLWD CNTR: CPT

## 2023-04-07 PROCEDURE — 6360000004 HC RX CONTRAST MEDICATION: Performed by: NURSE PRACTITIONER

## 2023-04-07 PROCEDURE — 71250 CT THORAX DX C-: CPT

## 2023-04-07 PROCEDURE — A9577 INJ MULTIHANCE: HCPCS | Performed by: NURSE PRACTITIONER

## 2023-04-07 PROCEDURE — 82565 ASSAY OF CREATININE: CPT

## 2023-04-07 RX ADMIN — GADOBENATE DIMEGLUMINE 20 ML: 529 INJECTION, SOLUTION INTRAVENOUS at 07:46

## 2023-04-25 ENCOUNTER — OFFICE VISIT (OUTPATIENT)
Age: 58
End: 2023-04-25
Payer: MEDICAID

## 2023-04-25 VITALS
DIASTOLIC BLOOD PRESSURE: 74 MMHG | SYSTOLIC BLOOD PRESSURE: 132 MMHG | BODY MASS INDEX: 26.28 KG/M2 | HEIGHT: 72 IN | OXYGEN SATURATION: 100 % | HEART RATE: 65 BPM | WEIGHT: 194 LBS | RESPIRATION RATE: 18 BRPM | TEMPERATURE: 97.7 F

## 2023-04-25 DIAGNOSIS — E55.9 VITAMIN D DEFICIENCY: ICD-10-CM

## 2023-04-25 DIAGNOSIS — J30.2 SEASONAL ALLERGIES: ICD-10-CM

## 2023-04-25 DIAGNOSIS — E79.0 HYPERURICEMIA WITHOUT SIGNS OF INFLAMMATORY ARTHRITIS AND TOPHACEOUS DISEASE: ICD-10-CM

## 2023-04-25 DIAGNOSIS — D64.9 ANEMIA, UNSPECIFIED TYPE: ICD-10-CM

## 2023-04-25 DIAGNOSIS — I10 ESSENTIAL (PRIMARY) HYPERTENSION: ICD-10-CM

## 2023-04-25 DIAGNOSIS — M06.9 RHEUMATOID ARTHRITIS, INVOLVING UNSPECIFIED SITE, UNSPECIFIED WHETHER RHEUMATOID FACTOR PRESENT (HCC): ICD-10-CM

## 2023-04-25 DIAGNOSIS — M10.9 GOUT, UNSPECIFIED CAUSE, UNSPECIFIED CHRONICITY, UNSPECIFIED SITE: ICD-10-CM

## 2023-04-25 DIAGNOSIS — N52.9 ERECTILE DYSFUNCTION, UNSPECIFIED ERECTILE DYSFUNCTION TYPE: Primary | ICD-10-CM

## 2023-04-25 DIAGNOSIS — K21.9 GASTROESOPHAGEAL REFLUX DISEASE, UNSPECIFIED WHETHER ESOPHAGITIS PRESENT: ICD-10-CM

## 2023-04-25 DIAGNOSIS — Z12.11 SCREEN FOR COLON CANCER: ICD-10-CM

## 2023-04-25 PROCEDURE — 99214 OFFICE O/P EST MOD 30 MIN: CPT | Performed by: INTERNAL MEDICINE

## 2023-04-25 PROCEDURE — 3075F SYST BP GE 130 - 139MM HG: CPT | Performed by: INTERNAL MEDICINE

## 2023-04-25 PROCEDURE — 3078F DIAST BP <80 MM HG: CPT | Performed by: INTERNAL MEDICINE

## 2023-04-25 PROCEDURE — 99211 OFF/OP EST MAY X REQ PHY/QHP: CPT | Performed by: INTERNAL MEDICINE

## 2023-04-25 RX ORDER — PANTOPRAZOLE SODIUM 40 MG/1
40 TABLET, DELAYED RELEASE ORAL DAILY
Qty: 90 TABLET | Refills: 0 | Status: SHIPPED | OUTPATIENT
Start: 2023-04-25 | End: 2023-07-24

## 2023-04-25 RX ORDER — FLUTICASONE PROPIONATE 50 MCG
SPRAY, SUSPENSION (ML) NASAL
Qty: 16 G | Refills: 1 | Status: SHIPPED | OUTPATIENT
Start: 2023-04-25

## 2023-04-25 RX ORDER — FAMOTIDINE 40 MG/1
TABLET, FILM COATED ORAL
Qty: 30 TABLET | Status: CANCELLED | OUTPATIENT
Start: 2023-04-25

## 2023-04-25 RX ORDER — INDOMETHACIN 50 MG/1
50 CAPSULE ORAL 3 TIMES DAILY PRN
Qty: 90 CAPSULE | Refills: 0 | Status: SHIPPED | OUTPATIENT
Start: 2023-04-25 | End: 2023-05-25

## 2023-04-25 RX ORDER — FERROUS SULFATE 325(65) MG
1 TABLET ORAL DAILY
Qty: 30 TABLET | Refills: 2
Start: 2023-04-25

## 2023-04-25 RX ORDER — SILDENAFIL 100 MG/1
100 TABLET, FILM COATED ORAL DAILY PRN
Qty: 90 TABLET | Refills: 0 | Status: SHIPPED | OUTPATIENT
Start: 2023-04-25 | End: 2023-07-24

## 2023-04-25 SDOH — ECONOMIC STABILITY: FOOD INSECURITY: WITHIN THE PAST 12 MONTHS, THE FOOD YOU BOUGHT JUST DIDN'T LAST AND YOU DIDN'T HAVE MONEY TO GET MORE.: NEVER TRUE

## 2023-04-25 SDOH — ECONOMIC STABILITY: FOOD INSECURITY: WITHIN THE PAST 12 MONTHS, YOU WORRIED THAT YOUR FOOD WOULD RUN OUT BEFORE YOU GOT MONEY TO BUY MORE.: NEVER TRUE

## 2023-04-25 SDOH — ECONOMIC STABILITY: INCOME INSECURITY: HOW HARD IS IT FOR YOU TO PAY FOR THE VERY BASICS LIKE FOOD, HOUSING, MEDICAL CARE, AND HEATING?: NOT HARD AT ALL

## 2023-04-25 ASSESSMENT — PATIENT HEALTH QUESTIONNAIRE - PHQ9
SUM OF ALL RESPONSES TO PHQ QUESTIONS 1-9: 0
1. LITTLE INTEREST OR PLEASURE IN DOING THINGS: 0
SUM OF ALL RESPONSES TO PHQ9 QUESTIONS 1 & 2: 0
SUM OF ALL RESPONSES TO PHQ QUESTIONS 1-9: 0
SUM OF ALL RESPONSES TO PHQ QUESTIONS 1-9: 0
2. FEELING DOWN, DEPRESSED OR HOPELESS: 0
SUM OF ALL RESPONSES TO PHQ QUESTIONS 1-9: 0

## 2023-04-25 ASSESSMENT — ENCOUNTER SYMPTOMS
EYES NEGATIVE: 1
ALLERGIC/IMMUNOLOGIC NEGATIVE: 1
GASTROINTESTINAL NEGATIVE: 1
RESPIRATORY NEGATIVE: 1

## 2023-04-25 NOTE — PROGRESS NOTES
Ryder Busch presents today for   Chief Complaint   Patient presents with    Follow-up    Discuss Labs                 1. \"Have you been to the ER, urgent care clinic since your last visit? Hospitalized since your last visit? \" no    2. \"Have you seen or consulted any other health care providers outside of the [address] [place] [address] since your last visit? \" no     3. For patients aged 39-70: Has the patient had a colonoscopy / FIT/ Cologuard? Yes - no Care Gap present      If the patient is female:    4. For patients aged 41-77: Has the patient had a mammogram within the past 2 years? NA - based on age or sex      11. For patients aged 21-65: Has the patient had a pap smear?  NA - based on age or sex

## 2023-04-25 NOTE — PROGRESS NOTES
Wilnette Boeck (: 1965) is a 59 y.o. male, here for evaluation of the following chief complaint(s):  Follow-up and Discuss Labs       ASSESSMENT/PLAN:  Below is the assessment and plan developed based on review of pertinent history, physical exam, labs, studies, and medications. 1. Erectile dysfunction, unspecified erectile dysfunction type  -     sildenafil (VIAGRA) 100 MG tablet; Take 1 tablet by mouth daily as needed for Erectile Dysfunction, Disp-90 tablet, R-0Normal  -     Urology [facility]  -     Testosterone, free, total; Future  2. Hyperuricemia without signs of inflammatory arthritis and tophaceous disease  Assessment & Plan:   Uric acid level controlled with allopurinol  Orders:  -     indomethacin (INDOCIN) 50 MG capsule; Take 1 capsule by mouth 3 times daily as needed for Pain ceived the following from [facility]: Outside name: indomethacin (INDOCIN) 50 mg capsule, Disp-90 capsule, R-0Normal  3. Gastroesophageal reflux disease, unspecified whether esophagitis present  Assessment & Plan:  Stable on Protonix 40 mg once daily      Orders:  -     pantoprazole (PROTONIX) 40 MG tablet; Take 1 tablet by mouth daily ceived the following from [facility]: Outside name: pantoprazole (PROTONIX) 40 mg tablet, Disp-90 tablet, R-0Normal  4. Seasonal allergies  Assessment & Plan:   Stable on Flonase and cetirizine  Orders:  -     fluticasone (FLONASE) 50 MCG/ACT nasal spray; One spray in each nostril once a day at bedtime, Disp-16 g, R-1Normal  5. Gout, unspecified cause, unspecified chronicity, unspecified site  Assessment & Plan: On and off pain. Start indomethacin  As needed  6. Rheumatoid arthritis, involving unspecified site, unspecified whether rheumatoid factor present [facility])  Assessment & Plan:  Follows with rheumatologist  Orders:  -     Comprehensive Metabolic Panel; Future  -     CBC with Auto Differential; Future  7.  Anemia, unspecified type  Assessment &

## 2023-04-26 ENCOUNTER — TELEPHONE (OUTPATIENT)
Age: 58
End: 2023-04-26

## 2023-05-07 DIAGNOSIS — J30.2 SEASONAL ALLERGIES: ICD-10-CM

## 2023-05-08 RX ORDER — CETIRIZINE HYDROCHLORIDE 10 MG/1
TABLET ORAL
Qty: 30 TABLET | Refills: 0 | Status: SHIPPED | OUTPATIENT
Start: 2023-05-08

## 2023-05-15 ENCOUNTER — HOSPITAL ENCOUNTER (OUTPATIENT)
Facility: HOSPITAL | Age: 58
Discharge: HOME | End: 2023-05-15
Attending: RADIOLOGY | Admitting: RADIOLOGY
Payer: MEDICAID

## 2023-05-15 VITALS
TEMPERATURE: 98 F | OXYGEN SATURATION: 99 % | RESPIRATION RATE: 18 BRPM | BODY MASS INDEX: 26.23 KG/M2 | DIASTOLIC BLOOD PRESSURE: 75 MMHG | HEART RATE: 55 BPM | WEIGHT: 193.4 LBS | SYSTOLIC BLOOD PRESSURE: 135 MMHG

## 2023-05-15 DIAGNOSIS — C22.0: ICD-10-CM

## 2023-05-15 LAB
ANION GAP SERPL CALC-SCNC: 3 MMOL/L (ref 3–18)
APTT PPP: 30.8 SEC (ref 23–36.4)
BASOPHILS # BLD: 0 K/UL (ref 0–0.1)
BASOPHILS NFR BLD: 0 % (ref 0–2)
BUN SERPL-MCNC: 16 MG/DL (ref 7–18)
BUN/CREAT SERPL: 23 (ref 12–20)
CALCIUM SERPL-MCNC: 9.2 MG/DL (ref 8.5–10.1)
CHLORIDE SERPL-SCNC: 111 MMOL/L (ref 100–111)
CO2 SERPL-SCNC: 26 MMOL/L (ref 21–32)
CREAT SERPL-MCNC: 0.71 MG/DL (ref 0.6–1.3)
DIFFERENTIAL METHOD BLD: ABNORMAL
EOSINOPHIL # BLD: 0 K/UL (ref 0–0.4)
EOSINOPHIL NFR BLD: 0 % (ref 0–5)
ERYTHROCYTE [DISTWIDTH] IN BLOOD BY AUTOMATED COUNT: 15.7 % (ref 11.6–14.5)
GLUCOSE SERPL-MCNC: 98 MG/DL (ref 74–99)
HCT VFR BLD AUTO: 35.1 % (ref 36–48)
HGB BLD-MCNC: 12 G/DL (ref 13–16)
IMM GRANULOCYTES # BLD AUTO: 0 K/UL (ref 0–0.04)
IMM GRANULOCYTES NFR BLD AUTO: 1 % (ref 0–0.5)
INR PPP: 1 (ref 0.8–1.2)
LYMPHOCYTES # BLD: 1.3 K/UL (ref 0.9–3.6)
LYMPHOCYTES NFR BLD: 23 % (ref 21–52)
MCH RBC QN AUTO: 27.9 PG (ref 24–34)
MCHC RBC AUTO-ENTMCNC: 34.2 G/DL (ref 31–37)
MCV RBC AUTO: 81.6 FL (ref 78–100)
MONOCYTES # BLD: 0.8 K/UL (ref 0.05–1.2)
MONOCYTES NFR BLD: 14 % (ref 3–10)
NEUTS SEG # BLD: 3.5 K/UL (ref 1.8–8)
NEUTS SEG NFR BLD: 62 % (ref 40–73)
NRBC # BLD: 0 K/UL (ref 0–0.01)
NRBC BLD-RTO: 0 PER 100 WBC
PLATELET # BLD AUTO: 143 K/UL (ref 135–420)
PMV BLD AUTO: 12.2 FL (ref 9.2–11.8)
POTASSIUM SERPL-SCNC: 3.6 MMOL/L (ref 3.5–5.5)
PROTHROMBIN TIME: 13.7 SEC (ref 11.5–15.2)
RBC # BLD AUTO: 4.3 M/UL (ref 4.35–5.65)
SODIUM SERPL-SCNC: 140 MMOL/L (ref 136–145)
WBC # BLD AUTO: 5.6 K/UL (ref 4.6–13.2)

## 2023-05-15 PROCEDURE — 2580000003 HC RX 258: Performed by: RADIOLOGY

## 2023-05-15 PROCEDURE — 80048 BASIC METABOLIC PNL TOTAL CA: CPT

## 2023-05-15 PROCEDURE — 88341 IMHCHEM/IMCYTCHM EA ADD ANTB: CPT

## 2023-05-15 PROCEDURE — 88333 PATH CONSLTJ SURG CYTO XM 1: CPT

## 2023-05-15 PROCEDURE — 6360000002 HC RX W HCPCS: Performed by: RADIOLOGY

## 2023-05-15 PROCEDURE — 7100000011 HC PHASE II RECOVERY - ADDTL 15 MIN

## 2023-05-15 PROCEDURE — 88342 IMHCHEM/IMCYTCHM 1ST ANTB: CPT

## 2023-05-15 PROCEDURE — 88307 TISSUE EXAM BY PATHOLOGIST: CPT

## 2023-05-15 PROCEDURE — 99153 MOD SED SAME PHYS/QHP EA: CPT

## 2023-05-15 PROCEDURE — 7100000010 HC PHASE II RECOVERY - FIRST 15 MIN

## 2023-05-15 PROCEDURE — 85610 PROTHROMBIN TIME: CPT

## 2023-05-15 PROCEDURE — 88334 PATH CONSLTJ SURG CYTO XM EA: CPT

## 2023-05-15 PROCEDURE — 85025 COMPLETE CBC W/AUTO DIFF WBC: CPT

## 2023-05-15 PROCEDURE — 85730 THROMBOPLASTIN TIME PARTIAL: CPT

## 2023-05-15 RX ORDER — FENTANYL CITRATE 50 UG/ML
INJECTION, SOLUTION INTRAMUSCULAR; INTRAVENOUS PRN
Status: COMPLETED | OUTPATIENT
Start: 2023-05-15 | End: 2023-05-15

## 2023-05-15 RX ORDER — SODIUM CHLORIDE 9 MG/ML
INJECTION, SOLUTION INTRAVENOUS CONTINUOUS
Status: DISCONTINUED | OUTPATIENT
Start: 2023-05-15 | End: 2023-05-16 | Stop reason: HOSPADM

## 2023-05-15 RX ORDER — MIDAZOLAM HYDROCHLORIDE 1 MG/ML
INJECTION INTRAMUSCULAR; INTRAVENOUS
Status: DISCONTINUED
Start: 2023-05-15 | End: 2023-05-15 | Stop reason: HOSPADM

## 2023-05-15 RX ORDER — LIDOCAINE HYDROCHLORIDE 10 MG/ML
30 INJECTION, SOLUTION EPIDURAL; INFILTRATION; INTRACAUDAL; PERINEURAL ONCE
Status: DISCONTINUED | OUTPATIENT
Start: 2023-05-15 | End: 2023-05-16 | Stop reason: HOSPADM

## 2023-05-15 RX ORDER — MIDAZOLAM HYDROCHLORIDE 2 MG/2ML
INJECTION, SOLUTION INTRAMUSCULAR; INTRAVENOUS PRN
Status: COMPLETED | OUTPATIENT
Start: 2023-05-15 | End: 2023-05-15

## 2023-05-15 RX ORDER — FENTANYL CITRATE 50 UG/ML
INJECTION, SOLUTION INTRAMUSCULAR; INTRAVENOUS
Status: DISCONTINUED
Start: 2023-05-15 | End: 2023-05-15 | Stop reason: HOSPADM

## 2023-05-15 RX ADMIN — MIDAZOLAM HYDROCHLORIDE 0.5 MG: 1 INJECTION, SOLUTION INTRAMUSCULAR; INTRAVENOUS at 11:50

## 2023-05-15 RX ADMIN — FENTANYL CITRATE 50 MCG: 50 INJECTION, SOLUTION INTRAMUSCULAR; INTRAVENOUS at 11:35

## 2023-05-15 RX ADMIN — FENTANYL CITRATE 25 MCG: 50 INJECTION, SOLUTION INTRAMUSCULAR; INTRAVENOUS at 11:40

## 2023-05-15 RX ADMIN — MIDAZOLAM HYDROCHLORIDE 0.5 MG: 1 INJECTION, SOLUTION INTRAMUSCULAR; INTRAVENOUS at 11:40

## 2023-05-15 RX ADMIN — MIDAZOLAM HYDROCHLORIDE 0.5 MG: 1 INJECTION, SOLUTION INTRAMUSCULAR; INTRAVENOUS at 11:45

## 2023-05-15 RX ADMIN — FENTANYL CITRATE 25 MCG: 50 INJECTION, SOLUTION INTRAMUSCULAR; INTRAVENOUS at 11:45

## 2023-05-15 RX ADMIN — MIDAZOLAM HYDROCHLORIDE 0.5 MG: 1 INJECTION, SOLUTION INTRAMUSCULAR; INTRAVENOUS at 11:55

## 2023-05-15 RX ADMIN — FENTANYL CITRATE 25 MCG: 50 INJECTION, SOLUTION INTRAMUSCULAR; INTRAVENOUS at 11:55

## 2023-05-15 RX ADMIN — MIDAZOLAM HYDROCHLORIDE 1 MG: 1 INJECTION, SOLUTION INTRAMUSCULAR; INTRAVENOUS at 11:35

## 2023-05-15 RX ADMIN — FENTANYL CITRATE 25 MCG: 50 INJECTION, SOLUTION INTRAMUSCULAR; INTRAVENOUS at 11:50

## 2023-05-15 RX ADMIN — SODIUM CHLORIDE: 9 INJECTION, SOLUTION INTRAVENOUS at 10:12

## 2023-05-15 ASSESSMENT — PAIN - FUNCTIONAL ASSESSMENT: PAIN_FUNCTIONAL_ASSESSMENT: 0-10

## 2023-05-15 NOTE — H&P
History and Physical    Patient: Joanie Vazquez           Sex: male       DOA: 5/15/2023  YOB: 1965      Age:  59 y.o.     LOS:  LOS: 0 days        HPI:     Joanie Vazquez is a 59 y.o. male with history of cholangiocarcinoma who presents for an image guided liver mass biopsy.      Past Medical History:   Diagnosis Date    Arthritis     Chronic pain     joints due to Rheumatoid Arthritis    Contact dermatitis and other eczema, due to unspecified cause     dry skin in scalp    Depression     GERD (gastroesophageal reflux disease)     Gout     Headache(784.0)     Hiccups     Hiccups     Hypertension     Liver disease     Liver mass     Rheumatoid arthritis(714.0)     Rheumatoid arthritis(714.0) 1983    Seasonal allergic rhinitis        Past Surgical History:   Procedure Laterality Date    CT NEEDLE BIOPSY LIVER PERCUTANEOUS  3/4/2022    CT NEEDLE BIOPSY LIVER PERCUTANEOUS 3/4/2022 [address]    CYST REMOVAL  1983    large cyst removed on left shoulder    IR EMBOLIZATION TUMOR / ORGAN  5/3/2022    IR EMBOLIZATION TUMOR / ORGAN 5/3/2022 [address] ANGIO IR    IR EMBOLIZATION TUMOR / ORGAN  5/3/2022    IR VASC EMBOLIZE OCCLUDE ARTERY  4/18/2022    IR VASC EMBOLIZE OCCLUDE ARTERY 4/18/2022 [address] ANGIO IR    IR VASC EMBOLIZE OCCLUDE ARTERY  4/18/2022       Family History   Problem Relation Age of Onset    Rheum Arthritis Mother     Emphysema Mother     Thyroid Disease Brother     Emphysema Father     Cancer Sister 35        breast    Liver Disease Brother     Rheum Arthritis Father        Social History     Socioeconomic History    Marital status:    Tobacco Use    Smoking status: Some Days     Packs/day: 0.25     Types: Cigarettes    Smokeless tobacco: Never   Substance and Sexual Activity    Alcohol use: Yes    Drug use: Not Currently     Types: OTC, Prescription     Social Determinants of Health     Financial Resource Strain: Low Risk     Difficulty of Paying Living Expenses: Not hard at all   Food

## 2023-05-15 NOTE — DISCHARGE INSTRUCTIONS
DISCHARGE SUMMARY from Nurse    PATIENT INSTRUCTIONS:    After general anesthesia or intravenous sedation, for 24 hours or while taking prescription Narcotics:  Limit your activities  Do not drive and operate hazardous machinery  Do not make important personal or business decisions  Do  not drink alcoholic beverages  If you have not urinated within 8 hours after discharge, please contact your surgeon on call. Report the following to your surgeon:  Excessive pain, swelling, redness or odor of or around the surgical area  Temperature over 100.5  Nausea and vomiting lasting longer than 4 hours or if unable to take medications  Any signs of decreased circulation or nerve impairment to extremity: change in color, persistent  numbness, tingling, coldness or increase pain  Any questions    These are general instructions for a healthy lifestyle:    No smoking/ No tobacco products/ Avoid exposure to second hand smoke  Surgeon General's Warning:  Quitting smoking now greatly reduces serious risk to your health. Obesity, smoking, and sedentary lifestyle greatly increases your risk for illness    A healthy diet, regular physical exercise & weight monitoring are important for maintaining a healthy lifestyle    You may be retaining fluid if you have a history of heart failure or if you experience any of the following symptoms:  Weight gain of 3 pounds or more overnight or 5 pounds in a week, increased swelling in our hands or feet or shortness of breath while lying flat in bed. Please call your doctor as soon as you notice any of these symptoms; do not wait until your next office visit. The discharge information has been reviewed with the patient and son. The patient and son verbalized understanding.   Discharge medications reviewed with the patient and son and appropriate educational materials and side effects teaching were

## 2023-05-15 NOTE — PROCEDURES
RADIOLOGY POST PROCEDURE NOTE     May 15, 2023       12:34 PM     Preoperative Diagnosis:   Cholangiocarcinoma. Recurrence suspected. Postoperative Diagnosis:  Same. :  Dr. Jessica Ogden    Assistant:  None. Type of Anesthesia: 1% local lidocaine and IV moderate sedation with Versed and fentanyl. Procedure/Description: Image guided segment 8/4 a liver lesion core needle biopsy. Findings:   No bleeding. Estimated blood Loss: Minimal    Specimen Removed: Yes    Blood transfusions:  None. Implants:  None. Complications: None    Condition: Stable    Discharge Plan: Discharge home if stable and no bleeding in 3 hours. Resume blood thinners if any in 24 hours.     Hossein Neff MD

## 2023-05-15 NOTE — PROGRESS NOTES
TRANSFER - OUT REPORT:    Verbal report given to Alfred Reina on [employer]   being transferred to Phase 2 for routine post-op       Report consisted of patient's Situation, Background, Assessment and   Recommendations(SBAR). Information from the following report(s) Nurse Handoff Report was reviewed with the receiving nurse. [place] Assessment: No data recorded  Lines:   Peripheral IV 05/15/23 Right Antecubital (Active)        Opportunity for questions and clarification was provided.       Patient transported with:  Registered Nurse

## 2023-05-25 PROBLEM — Z12.11 SCREEN FOR COLON CANCER: Status: RESOLVED | Noted: 2022-11-25 | Resolved: 2023-05-25

## 2023-06-02 ENCOUNTER — HOSPITAL ENCOUNTER (OUTPATIENT)
Facility: HOSPITAL | Age: 58
Setting detail: SPECIMEN
Discharge: HOME | End: 2023-06-05

## 2023-06-02 ENCOUNTER — CLINICAL DOCUMENTATION (OUTPATIENT)
Facility: HOSPITAL | Age: 58
End: 2023-06-02

## 2023-06-02 DIAGNOSIS — C22.1 CHOLANGIOCARCINOMA (HCC): Primary | ICD-10-CM

## 2023-06-02 NOTE — PROGRESS NOTES
Interventional Radiology Clinic Follow Up Note    Patient: Myra Munguia             Sex: male        Date: 6/02/23    YOB: 1965      Age:  59 y.o. Referring Physician: Dr. Karina Rubio the patient - left voicemail  Biopsy results available   Recurrent hepatic cholangiocarcinoma  Discussed with oncologist Dr. Hetal Jararoom:   Re map (MAA shunt study in IR and CT liver tumor protocol for dose calculation)    Radioembolization of hepatic cholangiocarcinoma to be scheduled after mapping    Follow up with Dr. Natalia Wise post procedure to discuss     Orders are in place.  The patient will be called again next week to discuss

## 2023-06-21 ENCOUNTER — HOSPITAL ENCOUNTER (OUTPATIENT)
Facility: HOSPITAL | Age: 58
Discharge: HOME | End: 2023-06-24
Payer: MEDICAID

## 2023-06-21 DIAGNOSIS — C22.1 CHOLANGIOCARCINOMA (HCC): ICD-10-CM

## 2023-06-21 LAB — CREAT UR-MCNC: 0.8 MG/DL (ref 0.6–1.3)

## 2023-06-21 PROCEDURE — 6360000004 HC RX CONTRAST MEDICATION: Performed by: RADIOLOGY

## 2023-06-21 PROCEDURE — 82565 ASSAY OF CREATININE: CPT

## 2023-06-21 PROCEDURE — 74170 CT ABD WO CNTRST FLWD CNTRST: CPT

## 2023-06-21 RX ADMIN — IOPAMIDOL 100 ML: 755 INJECTION, SOLUTION INTRAVENOUS at 10:16

## 2023-06-27 ENCOUNTER — HOSPITAL ENCOUNTER (OUTPATIENT)
Facility: HOSPITAL | Age: 58
Discharge: HOME | End: 2023-06-30
Payer: MEDICAID

## 2023-06-27 VITALS
BODY MASS INDEX: 26.23 KG/M2 | HEART RATE: 48 BPM | DIASTOLIC BLOOD PRESSURE: 81 MMHG | HEIGHT: 72 IN | OXYGEN SATURATION: 99 % | RESPIRATION RATE: 18 BRPM | SYSTOLIC BLOOD PRESSURE: 135 MMHG

## 2023-06-27 DIAGNOSIS — C22.1 CHOLANGIOCARCINOMA (HCC): ICD-10-CM

## 2023-06-27 LAB
ALBUMIN SERPL-MCNC: 3.6 G/DL (ref 3.4–5)
ALBUMIN/GLOB SERPL: 0.9 (ref 0.8–1.7)
ALP SERPL-CCNC: 321 U/L (ref 45–117)
ALT SERPL-CCNC: 274 U/L (ref 16–61)
ANION GAP SERPL CALC-SCNC: 2 MMOL/L (ref 3–18)
APTT PPP: 28 SEC (ref 23–36.4)
AST SERPL-CCNC: 107 U/L (ref 10–38)
BASOPHILS # BLD: 0.1 K/UL (ref 0–0.1)
BASOPHILS NFR BLD: 1 % (ref 0–2)
BILIRUB DIRECT SERPL-MCNC: 0.5 MG/DL (ref 0–0.2)
BILIRUB SERPL-MCNC: 1.2 MG/DL (ref 0.2–1)
BUN SERPL-MCNC: 12 MG/DL (ref 7–18)
BUN/CREAT SERPL: 15 (ref 12–20)
CALCIUM SERPL-MCNC: 9.3 MG/DL (ref 8.5–10.1)
CHLORIDE SERPL-SCNC: 109 MMOL/L (ref 100–111)
CO2 SERPL-SCNC: 27 MMOL/L (ref 21–32)
CREAT SERPL-MCNC: 0.8 MG/DL (ref 0.6–1.3)
DIFFERENTIAL METHOD BLD: ABNORMAL
EOSINOPHIL # BLD: 0.1 K/UL (ref 0–0.4)
EOSINOPHIL NFR BLD: 1 % (ref 0–5)
ERYTHROCYTE [DISTWIDTH] IN BLOOD BY AUTOMATED COUNT: 15.8 % (ref 11.6–14.5)
GLOBULIN SER CALC-MCNC: 3.9 G/DL (ref 2–4)
GLUCOSE SERPL-MCNC: 103 MG/DL (ref 74–99)
HCT VFR BLD AUTO: 37.3 % (ref 36–48)
HGB BLD-MCNC: 12.8 G/DL (ref 13–16)
IMM GRANULOCYTES # BLD AUTO: 0 K/UL (ref 0–0.04)
IMM GRANULOCYTES NFR BLD AUTO: 0 % (ref 0–0.5)
INR PPP: 0.9 (ref 0.8–1.2)
LYMPHOCYTES # BLD: 1.6 K/UL (ref 0.9–3.6)
LYMPHOCYTES NFR BLD: 28 % (ref 21–52)
MCH RBC QN AUTO: 27.9 PG (ref 24–34)
MCHC RBC AUTO-ENTMCNC: 34.3 G/DL (ref 31–37)
MCV RBC AUTO: 81.4 FL (ref 78–100)
MONOCYTES # BLD: 0.3 K/UL (ref 0.05–1.2)
MONOCYTES NFR BLD: 5 % (ref 3–10)
NEUTS SEG # BLD: 3.5 K/UL (ref 1.8–8)
NEUTS SEG NFR BLD: 65 % (ref 40–73)
NRBC # BLD: 0 K/UL (ref 0–0.01)
NRBC BLD-RTO: 0 PER 100 WBC
PLATELET # BLD AUTO: 141 K/UL (ref 135–420)
PLATELET COMMENT: ABNORMAL
PMV BLD AUTO: 12.6 FL (ref 9.2–11.8)
POTASSIUM SERPL-SCNC: 3.7 MMOL/L (ref 3.5–5.5)
PROT SERPL-MCNC: 7.5 G/DL (ref 6.4–8.2)
PROTHROMBIN TIME: 12.4 SEC (ref 11.5–15.2)
RBC # BLD AUTO: 4.58 M/UL (ref 4.35–5.65)
RBC MORPH BLD: ABNORMAL
SODIUM SERPL-SCNC: 138 MMOL/L (ref 136–145)
WBC # BLD AUTO: 5.6 K/UL (ref 4.6–13.2)

## 2023-06-27 PROCEDURE — 85730 THROMBOPLASTIN TIME PARTIAL: CPT

## 2023-06-27 PROCEDURE — 80076 HEPATIC FUNCTION PANEL: CPT

## 2023-06-27 PROCEDURE — 85025 COMPLETE CBC W/AUTO DIFF WBC: CPT

## 2023-06-27 PROCEDURE — 2580000003 HC RX 258: Performed by: RADIOLOGY

## 2023-06-27 PROCEDURE — 85610 PROTHROMBIN TIME: CPT

## 2023-06-27 PROCEDURE — 80048 BASIC METABOLIC PNL TOTAL CA: CPT

## 2023-06-27 PROCEDURE — 3430000000 HC RX DIAGNOSTIC RADIOPHARMACEUTICAL: Performed by: RADIOLOGY

## 2023-06-27 PROCEDURE — A9540 TC99M MAA: HCPCS | Performed by: RADIOLOGY

## 2023-06-27 PROCEDURE — 36248 INS CATH ABD/L-EXT ART ADDL: CPT

## 2023-06-27 PROCEDURE — 6360000004 HC RX CONTRAST MEDICATION: Performed by: RADIOLOGY

## 2023-06-27 PROCEDURE — 78803 RP LOCLZJ TUM SPECT 1 AREA: CPT

## 2023-06-27 PROCEDURE — 2500000003 HC RX 250 WO HCPCS: Performed by: RADIOLOGY

## 2023-06-27 PROCEDURE — 6360000002 HC RX W HCPCS: Performed by: RADIOLOGY

## 2023-06-27 RX ORDER — SODIUM CHLORIDE 9 MG/ML
INJECTION, SOLUTION INTRAVENOUS CONTINUOUS
Status: DISCONTINUED | OUTPATIENT
Start: 2023-06-27 | End: 2023-07-01 | Stop reason: HOSPADM

## 2023-06-27 RX ORDER — MIDAZOLAM HYDROCHLORIDE 2 MG/2ML
INJECTION, SOLUTION INTRAMUSCULAR; INTRAVENOUS PRN
Status: COMPLETED | OUTPATIENT
Start: 2023-06-27 | End: 2023-06-27

## 2023-06-27 RX ORDER — FENTANYL CITRATE 50 UG/ML
INJECTION, SOLUTION INTRAMUSCULAR; INTRAVENOUS PRN
Status: COMPLETED | OUTPATIENT
Start: 2023-06-27 | End: 2023-06-27

## 2023-06-27 RX ORDER — LIDOCAINE HYDROCHLORIDE 10 MG/ML
INJECTION, SOLUTION EPIDURAL; INFILTRATION; INTRACAUDAL; PERINEURAL PRN
Status: COMPLETED | OUTPATIENT
Start: 2023-06-27 | End: 2023-06-27

## 2023-06-27 RX ORDER — ONDANSETRON 2 MG/ML
4 INJECTION INTRAMUSCULAR; INTRAVENOUS EVERY 6 HOURS PRN
Status: DISCONTINUED | OUTPATIENT
Start: 2023-06-27 | End: 2023-07-01 | Stop reason: HOSPADM

## 2023-06-27 RX ORDER — IODIXANOL 320 MG/ML
INJECTION, SOLUTION INTRAVASCULAR PRN
Status: COMPLETED | OUTPATIENT
Start: 2023-06-27 | End: 2023-06-27

## 2023-06-27 RX ORDER — ONDANSETRON 2 MG/ML
INJECTION INTRAMUSCULAR; INTRAVENOUS PRN
Status: COMPLETED | OUTPATIENT
Start: 2023-06-27 | End: 2023-06-27

## 2023-06-27 RX ADMIN — KIT FOR THE PREPARATION OF TECHNETIUM TC 99M ALBUMIN AGGREGATED 5.5 MILLICURIE: 2.5 INJECTION, POWDER, FOR SOLUTION INTRAVENOUS at 14:42

## 2023-06-27 RX ADMIN — FENTANYL CITRATE 25 MCG: 50 INJECTION INTRAMUSCULAR; INTRAVENOUS at 13:25

## 2023-06-27 RX ADMIN — ONDANSETRON 4 MG: 2 INJECTION INTRAMUSCULAR; INTRAVENOUS at 11:23

## 2023-06-27 RX ADMIN — MIDAZOLAM HYDROCHLORIDE 0.5 MG: 1 INJECTION, SOLUTION INTRAMUSCULAR; INTRAVENOUS at 11:48

## 2023-06-27 RX ADMIN — FENTANYL CITRATE 50 MCG: 50 INJECTION INTRAMUSCULAR; INTRAVENOUS at 11:35

## 2023-06-27 RX ADMIN — FENTANYL CITRATE 25 MCG: 50 INJECTION INTRAMUSCULAR; INTRAVENOUS at 11:41

## 2023-06-27 RX ADMIN — MIDAZOLAM HYDROCHLORIDE 1 MG: 1 INJECTION, SOLUTION INTRAMUSCULAR; INTRAVENOUS at 11:36

## 2023-06-27 RX ADMIN — MIDAZOLAM HYDROCHLORIDE 0.5 MG: 1 INJECTION, SOLUTION INTRAMUSCULAR; INTRAVENOUS at 11:42

## 2023-06-27 RX ADMIN — IODIXANOL 220 ML: 320 INJECTION, SOLUTION INTRAVASCULAR at 13:52

## 2023-06-27 RX ADMIN — FENTANYL CITRATE 25 MCG: 50 INJECTION INTRAMUSCULAR; INTRAVENOUS at 12:32

## 2023-06-27 RX ADMIN — FENTANYL CITRATE 50 MCG: 50 INJECTION INTRAMUSCULAR; INTRAVENOUS at 12:09

## 2023-06-27 RX ADMIN — LIDOCAINE HYDROCHLORIDE 10 ML: 10 INJECTION, SOLUTION EPIDURAL; INFILTRATION; INTRACAUDAL; PERINEURAL at 11:40

## 2023-06-27 RX ADMIN — MIDAZOLAM HYDROCHLORIDE 0.5 MG: 1 INJECTION, SOLUTION INTRAMUSCULAR; INTRAVENOUS at 13:25

## 2023-06-27 RX ADMIN — MIDAZOLAM HYDROCHLORIDE 1 MG: 1 INJECTION, SOLUTION INTRAMUSCULAR; INTRAVENOUS at 12:09

## 2023-06-27 RX ADMIN — FENTANYL CITRATE 25 MCG: 50 INJECTION INTRAMUSCULAR; INTRAVENOUS at 11:48

## 2023-06-27 RX ADMIN — CEFTRIAXONE 1000 MG: 1 INJECTION, POWDER, FOR SOLUTION INTRAMUSCULAR; INTRAVENOUS at 11:35

## 2023-06-27 RX ADMIN — MIDAZOLAM HYDROCHLORIDE 0.5 MG: 1 INJECTION, SOLUTION INTRAMUSCULAR; INTRAVENOUS at 12:32

## 2023-07-03 DIAGNOSIS — J30.2 SEASONAL ALLERGIES: ICD-10-CM

## 2023-07-03 RX ORDER — FLUTICASONE PROPIONATE 50 MCG
SPRAY, SUSPENSION (ML) NASAL
Qty: 1 EACH | Refills: 5 | Status: SHIPPED | OUTPATIENT
Start: 2023-07-03

## 2023-07-11 ENCOUNTER — HOSPITAL ENCOUNTER (OUTPATIENT)
Facility: HOSPITAL | Age: 58
Discharge: HOME | End: 2023-07-14
Payer: MEDICAID

## 2023-07-11 VITALS
BODY MASS INDEX: 26.28 KG/M2 | DIASTOLIC BLOOD PRESSURE: 88 MMHG | WEIGHT: 194 LBS | HEIGHT: 72 IN | SYSTOLIC BLOOD PRESSURE: 140 MMHG | RESPIRATION RATE: 17 BRPM | OXYGEN SATURATION: 98 % | HEART RATE: 48 BPM

## 2023-07-11 DIAGNOSIS — C22.1 CHOLANGIOCARCINOMA (HCC): ICD-10-CM

## 2023-07-11 LAB
ALBUMIN SERPL-MCNC: 3.8 G/DL (ref 3.4–5)
ALBUMIN/GLOB SERPL: 1 (ref 0.8–1.7)
ALP SERPL-CCNC: 372 U/L (ref 45–117)
ALT SERPL-CCNC: 244 U/L (ref 16–61)
ANION GAP SERPL CALC-SCNC: 3 MMOL/L (ref 3–18)
APTT PPP: 27.5 SEC (ref 23–36.4)
AST SERPL-CCNC: 107 U/L (ref 10–38)
BASOPHILS # BLD: 0 K/UL (ref 0–0.1)
BASOPHILS NFR BLD: 0 % (ref 0–2)
BILIRUB DIRECT SERPL-MCNC: 2 MG/DL (ref 0–0.2)
BILIRUB SERPL-MCNC: 2.7 MG/DL (ref 0.2–1)
BUN SERPL-MCNC: 13 MG/DL (ref 7–18)
BUN/CREAT SERPL: 17 (ref 12–20)
CALCIUM SERPL-MCNC: 9.3 MG/DL (ref 8.5–10.1)
CHLORIDE SERPL-SCNC: 108 MMOL/L (ref 100–111)
CO2 SERPL-SCNC: 27 MMOL/L (ref 21–32)
CREAT SERPL-MCNC: 0.78 MG/DL (ref 0.6–1.3)
DIFFERENTIAL METHOD BLD: ABNORMAL
EOSINOPHIL # BLD: 0 K/UL (ref 0–0.4)
EOSINOPHIL NFR BLD: 0 % (ref 0–5)
ERYTHROCYTE [DISTWIDTH] IN BLOOD BY AUTOMATED COUNT: 15.9 % (ref 11.6–14.5)
GLOBULIN SER CALC-MCNC: 4 G/DL (ref 2–4)
GLUCOSE SERPL-MCNC: 101 MG/DL (ref 74–99)
HCT VFR BLD AUTO: 37.9 % (ref 36–48)
HGB BLD-MCNC: 13 G/DL (ref 13–16)
IMM GRANULOCYTES # BLD AUTO: 0 K/UL
IMM GRANULOCYTES NFR BLD AUTO: 0 %
INR PPP: 0.9 (ref 0.8–1.2)
LYMPHOCYTES # BLD: 1.4 K/UL (ref 0.9–3.6)
LYMPHOCYTES NFR BLD: 21 % (ref 21–52)
MCH RBC QN AUTO: 27.8 PG (ref 24–34)
MCHC RBC AUTO-ENTMCNC: 34.3 G/DL (ref 31–37)
MCV RBC AUTO: 81.2 FL (ref 78–100)
MONOCYTES # BLD: 0.5 K/UL (ref 0.05–1.2)
MONOCYTES NFR BLD: 7 % (ref 3–10)
NEUTS SEG # BLD: 5 K/UL (ref 1.8–8)
NEUTS SEG NFR BLD: 72 % (ref 40–73)
NRBC # BLD: 0 K/UL (ref 0–0.01)
NRBC BLD-RTO: 0 PER 100 WBC
PLATELET # BLD AUTO: 144 K/UL (ref 135–420)
PLATELET COMMENT: ABNORMAL
PMV BLD AUTO: 12.8 FL (ref 9.2–11.8)
POTASSIUM SERPL-SCNC: 3.5 MMOL/L (ref 3.5–5.5)
PROT SERPL-MCNC: 7.8 G/DL (ref 6.4–8.2)
PROTHROMBIN TIME: 12.3 SEC (ref 11.5–15.2)
RBC # BLD AUTO: 4.67 M/UL (ref 4.35–5.65)
RBC MORPH BLD: ABNORMAL
SODIUM SERPL-SCNC: 138 MMOL/L (ref 136–145)
WBC # BLD AUTO: 6.9 K/UL (ref 4.6–13.2)

## 2023-07-11 PROCEDURE — 80076 HEPATIC FUNCTION PANEL: CPT

## 2023-07-11 PROCEDURE — 85610 PROTHROMBIN TIME: CPT

## 2023-07-11 PROCEDURE — 2580000003 HC RX 258: Performed by: RADIOLOGY

## 2023-07-11 PROCEDURE — 2500000003 HC RX 250 WO HCPCS: Performed by: RADIOLOGY

## 2023-07-11 PROCEDURE — C9113 INJ PANTOPRAZOLE SODIUM, VIA: HCPCS | Performed by: RADIOLOGY

## 2023-07-11 PROCEDURE — 37242 VASC EMBOLIZE/OCCLUDE ARTERY: CPT

## 2023-07-11 PROCEDURE — 3430000000 HC RX DIAGNOSTIC RADIOPHARMACEUTICAL: Performed by: RADIOLOGY

## 2023-07-11 PROCEDURE — A4216 STERILE WATER/SALINE, 10 ML: HCPCS | Performed by: RADIOLOGY

## 2023-07-11 PROCEDURE — 6360000002 HC RX W HCPCS: Performed by: RADIOLOGY

## 2023-07-11 PROCEDURE — C2616 BRACHYTX, NON-STR,YTTRIUM-90: HCPCS | Performed by: RADIOLOGY

## 2023-07-11 PROCEDURE — 85730 THROMBOPLASTIN TIME PARTIAL: CPT

## 2023-07-11 PROCEDURE — 85025 COMPLETE CBC W/AUTO DIFF WBC: CPT

## 2023-07-11 PROCEDURE — 80048 BASIC METABOLIC PNL TOTAL CA: CPT

## 2023-07-11 PROCEDURE — 77790 RADIATION HANDLING: CPT

## 2023-07-11 RX ORDER — ONDANSETRON 4 MG/1
4 TABLET, FILM COATED ORAL EVERY 8 HOURS PRN
Qty: 30 TABLET | Refills: 0 | Status: SHIPPED | OUTPATIENT
Start: 2023-07-11 | End: 2023-08-10

## 2023-07-11 RX ORDER — DIPHENHYDRAMINE HYDROCHLORIDE 50 MG/ML
50 INJECTION INTRAMUSCULAR; INTRAVENOUS ONCE
Status: COMPLETED | OUTPATIENT
Start: 2023-07-11 | End: 2023-07-11

## 2023-07-11 RX ORDER — ONDANSETRON 2 MG/ML
4 INJECTION INTRAMUSCULAR; INTRAVENOUS ONCE
Status: COMPLETED | OUTPATIENT
Start: 2023-07-11 | End: 2023-07-11

## 2023-07-11 RX ORDER — LEVOFLOXACIN 500 MG/1
500 TABLET, FILM COATED ORAL DAILY
Qty: 10 TABLET | Refills: 0 | Status: SHIPPED | OUTPATIENT
Start: 2023-07-11 | End: 2023-07-21

## 2023-07-11 RX ORDER — ONDANSETRON 2 MG/ML
INJECTION INTRAMUSCULAR; INTRAVENOUS PRN
Status: COMPLETED | OUTPATIENT
Start: 2023-07-11 | End: 2023-07-11

## 2023-07-11 RX ORDER — LIDOCAINE HYDROCHLORIDE 10 MG/ML
INJECTION, SOLUTION EPIDURAL; INFILTRATION; INTRACAUDAL; PERINEURAL PRN
Status: COMPLETED | OUTPATIENT
Start: 2023-07-11 | End: 2023-07-11

## 2023-07-11 RX ORDER — FENTANYL CITRATE 50 UG/ML
INJECTION, SOLUTION INTRAMUSCULAR; INTRAVENOUS PRN
Status: COMPLETED | OUTPATIENT
Start: 2023-07-11 | End: 2023-07-11

## 2023-07-11 RX ORDER — SODIUM CHLORIDE 9 MG/ML
INJECTION, SOLUTION INTRAVENOUS CONTINUOUS
Status: DISCONTINUED | OUTPATIENT
Start: 2023-07-11 | End: 2023-07-15 | Stop reason: HOSPADM

## 2023-07-11 RX ORDER — MIDAZOLAM HYDROCHLORIDE 2 MG/2ML
INJECTION, SOLUTION INTRAMUSCULAR; INTRAVENOUS PRN
Status: COMPLETED | OUTPATIENT
Start: 2023-07-11 | End: 2023-07-11

## 2023-07-11 RX ORDER — HYDROCODONE BITARTRATE AND ACETAMINOPHEN 5; 325 MG/1; MG/1
1 TABLET ORAL EVERY 6 HOURS PRN
Qty: 12 TABLET | Refills: 0 | Status: SHIPPED | OUTPATIENT
Start: 2023-07-11 | End: 2023-07-14

## 2023-07-11 RX ADMIN — MIDAZOLAM HYDROCHLORIDE 0.5 MG: 1 INJECTION, SOLUTION INTRAMUSCULAR; INTRAVENOUS at 11:00

## 2023-07-11 RX ADMIN — FENTANYL CITRATE 50 MCG: 50 INJECTION INTRAMUSCULAR; INTRAVENOUS at 10:25

## 2023-07-11 RX ADMIN — ONDANSETRON 4 MG: 2 INJECTION INTRAMUSCULAR; INTRAVENOUS at 11:52

## 2023-07-11 RX ADMIN — MIDAZOLAM HYDROCHLORIDE 0.5 MG: 1 INJECTION, SOLUTION INTRAMUSCULAR; INTRAVENOUS at 11:20

## 2023-07-11 RX ADMIN — MIDAZOLAM HYDROCHLORIDE 1 MG: 1 INJECTION, SOLUTION INTRAMUSCULAR; INTRAVENOUS at 10:15

## 2023-07-11 RX ADMIN — FENTANYL CITRATE 25 MCG: 50 INJECTION INTRAMUSCULAR; INTRAVENOUS at 12:15

## 2023-07-11 RX ADMIN — FENTANYL CITRATE 25 MCG: 50 INJECTION INTRAMUSCULAR; INTRAVENOUS at 11:20

## 2023-07-11 RX ADMIN — FENTANYL CITRATE 50 MCG: 50 INJECTION INTRAMUSCULAR; INTRAVENOUS at 10:15

## 2023-07-11 RX ADMIN — ONDANSETRON 4 MG: 2 INJECTION INTRAMUSCULAR; INTRAVENOUS at 09:05

## 2023-07-11 RX ADMIN — DEXAMETHASONE SODIUM PHOSPHATE 20 MG: 4 INJECTION, SOLUTION INTRAMUSCULAR; INTRAVENOUS at 09:05

## 2023-07-11 RX ADMIN — FENTANYL CITRATE 25 MCG: 50 INJECTION INTRAMUSCULAR; INTRAVENOUS at 10:40

## 2023-07-11 RX ADMIN — FENTANYL CITRATE 25 MCG: 50 INJECTION INTRAMUSCULAR; INTRAVENOUS at 11:00

## 2023-07-11 RX ADMIN — Medication 3.6 GBQ: at 16:21

## 2023-07-11 RX ADMIN — MIDAZOLAM HYDROCHLORIDE 0.5 MG: 1 INJECTION, SOLUTION INTRAMUSCULAR; INTRAVENOUS at 12:15

## 2023-07-11 RX ADMIN — LIDOCAINE HYDROCHLORIDE 5 ML: 10 INJECTION, SOLUTION EPIDURAL; INFILTRATION; INTRACAUDAL; PERINEURAL at 10:20

## 2023-07-11 RX ADMIN — PANTOPRAZOLE SODIUM 40 MG: 40 INJECTION, POWDER, FOR SOLUTION INTRAVENOUS at 09:05

## 2023-07-11 RX ADMIN — DIPHENHYDRAMINE HYDROCHLORIDE 50 MG: 50 INJECTION, SOLUTION INTRAMUSCULAR; INTRAVENOUS at 09:05

## 2023-07-11 RX ADMIN — CEFTRIAXONE 1000 MG: 1 INJECTION, POWDER, FOR SOLUTION INTRAMUSCULAR; INTRAVENOUS at 10:20

## 2023-07-11 RX ADMIN — MIDAZOLAM HYDROCHLORIDE 1 MG: 1 INJECTION, SOLUTION INTRAMUSCULAR; INTRAVENOUS at 10:25

## 2023-07-11 RX ADMIN — MIDAZOLAM HYDROCHLORIDE 0.5 MG: 1 INJECTION, SOLUTION INTRAMUSCULAR; INTRAVENOUS at 10:40

## 2023-07-11 NOTE — PROCEDURES
RADIOLOGY POST PROCEDURE NOTE     July 11, 2023       5:36 PM     Preoperative Diagnosis:   Cholangiocarcinoma. Recurrence    Postoperative Diagnosis:  Same. :  Dr. Sophie Diggs    Assistant:  None. Type of Anesthesia: 1% local lidocaine and IV moderate sedation with Versed and fentanyl. Procedure/Description: Image guided radioembolization of recurrent cholangiocarcinoma. Findings:   No bleeding. See report. Estimated blood Loss: Minimal    Specimen Removed: No    Blood transfusions:  None. Implants:  None. Complications: None    Condition: Stable    Discharge Plan: Discharge home if stable and no bleeding. Resume blood thinners if any 24 hours.     Dick Michael MD

## 2023-07-11 NOTE — PROGRESS NOTES
0745  Cath holding summary     Patient escorted to cath holding from waiting area ambulatory, alert and oriented x 4, voicing no complaints. Changed into gown and placed on monitor. NPO since MN. Lab results, med rec and H&P reviewed on chart. PIV x 1 inserted without difficulty. 1000  TRANSFER - OUT REPORT:  Verbal report given to RT Riccardo on dAriel Urbina  being transferred to IR angio for ordered procedure    Report consisted of patient's Situation, Background, Assessment and   Recommendations(SBAR). Information from the following report(s) Intake/Output, MAR, Recent Results, Med Rec Status, Pre Procedure Checklist, and Procedure Verification was reviewed with the receiving nurse. Lines:   Peripheral IV 07/11/23 Distal;Right; Anterior Cephalic (Active)     Opportunity for questions and clarification was provided.     Patient transported with:  [employer]

## 2023-07-11 NOTE — DISCHARGE INSTRUCTIONS
idea to know your test results and keep a list of the medicines you take. When should you call for help? Call 911 anytime you think you may need emergency care. For example, call if:  You passed out (lost consciousness). You have severe trouble breathing. You have sudden chest pain and shortness of breath, or you cough up blood. You have symptoms of a heart attack. These may include:  Chest pain or pressure, or a strange feeling in the chest.  Sweating. Shortness of breath. Nausea or vomiting. Pain, pressure, or a strange feeling in the back, neck, jaw, or upper belly, or in one or both shoulders or arms. Lightheadedness or sudden weakness. A fast or irregular heartbeat. After you call 911, the  may tel you to chew 1 adult-strength or 2 to 4 low-dose aspirin. Wait for an ambulance. Do not try to drive yourself. You have been diagnosed with angina, and you have symptoms that do not go away with rest or are not getting better within 5 minutes after you take a dose of nitroglycerin. Call your doctor now or seek immediate medical care if:  You are bleeding from the area where the catheter was put in your artery. You have a fast-growing, painful lump at the catheter site. You have signs of infection, such as: Increased pain, swelling, warmth, or redness. Red streaks leading from the catheter site. Pus draining from the catheter site. A fever. Your leg or arm looks blue or feels cold, numb, or tingly. These are general instructions for a healthy lifestyle:    No smoking/ No tobacco products/ Avoid exposure to second hand smoke    Surgeon General's Warning:  Quitting smoking now greatly reduces serious risk to your health.     Obesity, smoking, and sedentary lifestyle greatly increases your risk for illness    A healthy diet, regular physical exercise & weight monitoring are important for maintaining a healthy lifestyle    You may be retaining fluid if you have a history of heart failure or if

## 2023-07-14 ENCOUNTER — CLINICAL DOCUMENTATION (OUTPATIENT)
Facility: HOSPITAL | Age: 58
End: 2023-07-14

## 2023-07-14 NOTE — PROGRESS NOTES
Brief GI Note    Patient is currently admitted at [facility]. Admitting provider called our service for recommendations and to ask for transfer. 63yo M with unfortunate history of cholangiocarcinoma who has been on Palliative chemoradiation with Dr Yarelsi Tellez. He was last seen in our practice in Oct/22. I have no records available, but it appears he presented to [facility] with worsening abdominal pain. Provider at [facility] mentions there is worsening liver profile in biliary pattern with possible new tumor thrombus. Recommended calling Oncology team who has been taking care of him. Defining goals of care is the most important step. If they decide that aggressive care is the way to go, patient should be preferably transferred to hospital with ERCP capabilities. Alternatively, IR with [facility] could be attempted.     Zain Mishra MD  7/14/2023  5:55 PM

## 2023-07-25 ENCOUNTER — NURSE ONLY (OUTPATIENT)
Age: 58
End: 2023-07-25

## 2023-07-25 DIAGNOSIS — N52.9 ERECTILE DYSFUNCTION, UNSPECIFIED ERECTILE DYSFUNCTION TYPE: ICD-10-CM

## 2023-07-25 DIAGNOSIS — E55.9 VITAMIN D DEFICIENCY: ICD-10-CM

## 2023-07-25 DIAGNOSIS — D64.9 ANEMIA, UNSPECIFIED TYPE: ICD-10-CM

## 2023-07-25 DIAGNOSIS — M06.9 RHEUMATOID ARTHRITIS, INVOLVING UNSPECIFIED SITE, UNSPECIFIED WHETHER RHEUMATOID FACTOR PRESENT (HCC): ICD-10-CM

## 2023-07-25 DIAGNOSIS — E79.0 HYPERURICEMIA WITHOUT SIGNS OF INFLAMMATORY ARTHRITIS AND TOPHACEOUS DISEASE: ICD-10-CM

## 2023-07-26 DIAGNOSIS — J30.2 SEASONAL ALLERGIES: ICD-10-CM

## 2023-07-26 LAB
25(OH)D3+25(OH)D2 SERPL-MCNC: 34 NG/ML (ref 30–100)
ALBUMIN SERPL-MCNC: 3.7 G/DL (ref 3.8–4.9)
ALBUMIN/GLOB SERPL: 1.2 {RATIO} (ref 1.2–2.2)
ALP SERPL-CCNC: 311 IU/L (ref 44–121)
ALT SERPL-CCNC: 80 IU/L (ref 0–44)
AST SERPL-CCNC: 60 IU/L (ref 0–40)
BASOPHILS # BLD AUTO: 0 X10E3/UL (ref 0–0.2)
BASOPHILS NFR BLD AUTO: 1 %
BILIRUB SERPL-MCNC: 1.4 MG/DL (ref 0–1.2)
BUN SERPL-MCNC: 12 MG/DL (ref 6–24)
BUN/CREAT SERPL: 17 (ref 9–20)
CALCIUM SERPL-MCNC: 9.2 MG/DL (ref 8.7–10.2)
CHLORIDE SERPL-SCNC: 99 MMOL/L (ref 96–106)
CO2 SERPL-SCNC: 24 MMOL/L (ref 20–29)
CREAT SERPL-MCNC: 0.7 MG/DL (ref 0.76–1.27)
EGFRCR SERPLBLD CKD-EPI 2021: 106 ML/MIN/1.73
EOSINOPHIL # BLD AUTO: 0 X10E3/UL (ref 0–0.4)
EOSINOPHIL NFR BLD AUTO: 0 %
ERYTHROCYTE [DISTWIDTH] IN BLOOD BY AUTOMATED COUNT: 14.6 % (ref 11.6–15.4)
GLOBULIN SER CALC-MCNC: 3 G/DL (ref 1.5–4.5)
GLUCOSE SERPL-MCNC: 87 MG/DL (ref 70–99)
HCT VFR BLD AUTO: 33.1 % (ref 37.5–51)
HGB BLD-MCNC: 11 G/DL (ref 13–17.7)
IMM GRANULOCYTES # BLD AUTO: 0.1 X10E3/UL (ref 0–0.1)
IMM GRANULOCYTES NFR BLD AUTO: 1 %
IRON SATN MFR SERPL: 24 % (ref 15–55)
IRON SERPL-MCNC: 63 UG/DL (ref 38–169)
LYMPHOCYTES # BLD AUTO: 1 X10E3/UL (ref 0.7–3.1)
LYMPHOCYTES NFR BLD AUTO: 12 %
MCH RBC QN AUTO: 27.4 PG (ref 26.6–33)
MCHC RBC AUTO-ENTMCNC: 33.2 G/DL (ref 31.5–35.7)
MCV RBC AUTO: 83 FL (ref 79–97)
MONOCYTES # BLD AUTO: 1 X10E3/UL (ref 0.1–0.9)
MONOCYTES NFR BLD AUTO: 11 %
NEUTROPHILS # BLD AUTO: 6.6 X10E3/UL (ref 1.4–7)
NEUTROPHILS NFR BLD AUTO: 75 %
PLATELET # BLD AUTO: 205 X10E3/UL (ref 150–450)
POTASSIUM SERPL-SCNC: 4.1 MMOL/L (ref 3.5–5.2)
PROT SERPL-MCNC: 6.7 G/DL (ref 6–8.5)
RBC # BLD AUTO: 4.01 X10E6/UL (ref 4.14–5.8)
SODIUM SERPL-SCNC: 134 MMOL/L (ref 134–144)
TIBC SERPL-MCNC: 262 UG/DL (ref 250–450)
UIBC SERPL-MCNC: 199 UG/DL (ref 111–343)
URATE SERPL-MCNC: 3.2 MG/DL (ref 3.8–8.4)
WBC # BLD AUTO: 8.8 X10E3/UL (ref 3.4–10.8)

## 2023-07-26 RX ORDER — CETIRIZINE HYDROCHLORIDE 10 MG/1
TABLET ORAL
Qty: 30 TABLET | Refills: 0 | Status: SHIPPED | OUTPATIENT
Start: 2023-07-26

## 2023-07-26 NOTE — TELEPHONE ENCOUNTER
PCP: Erik Hernández MD    Previous refill per chart  cetirizine (ZYRTEC) 10 MG tablet 30 tablet 0 6/12/2023     Sig: TAKE ONE TABLET BY MOUTH DAILY    Sent to pharmacy as: Cetirizine HCl 10 MG Oral Tablet (ZYRTEC)    E-Prescribing Status: Receipt confirmed by pharmacy (6/12/2023 12:01 PM EDT)        Future Appointments   Date Time Provider [address]   7/31/2023 10:30 AM HBV CT RM 1 [facility] [place]   8/1/2023  8:40 AM Erik Hernández MD IOC BS AMB   8/10/2023 11:20 AM Ruth Aburto PT Ramya Snell Sched   10/18/2023  8:45 AM MD Ramya Bernard Sched   1/4/2024  9:40 AM Nichelle Sotomayor PALivierC Ramya Snell Sched

## 2023-07-31 ENCOUNTER — HOSPITAL ENCOUNTER (OUTPATIENT)
Facility: HOSPITAL | Age: 58
Discharge: HOME | End: 2023-08-03
Attending: INTERNAL MEDICINE
Payer: MEDICAID

## 2023-07-31 DIAGNOSIS — C24.8 MALIGNANT NEOPLASM GALLBLADDER AND EXTRAHEPATIC BILE DUCTS (HCC): ICD-10-CM

## 2023-07-31 LAB
TESTOST FREE SERPL-MCNC: 3.9 PG/ML (ref 7.2–24)
TESTOST SERPL-MCNC: 630 NG/DL (ref 264–916)

## 2023-07-31 PROCEDURE — 71250 CT THORAX DX C-: CPT

## 2023-08-01 ENCOUNTER — OFFICE VISIT (OUTPATIENT)
Age: 58
End: 2023-08-01
Payer: MEDICAID

## 2023-08-01 VITALS
DIASTOLIC BLOOD PRESSURE: 69 MMHG | BODY MASS INDEX: 26.01 KG/M2 | WEIGHT: 192 LBS | OXYGEN SATURATION: 100 % | HEIGHT: 72 IN | RESPIRATION RATE: 16 BRPM | HEART RATE: 65 BPM | TEMPERATURE: 98.2 F | SYSTOLIC BLOOD PRESSURE: 120 MMHG

## 2023-08-01 DIAGNOSIS — I10 ESSENTIAL (PRIMARY) HYPERTENSION: ICD-10-CM

## 2023-08-01 DIAGNOSIS — E79.0 HYPERURICEMIA WITHOUT SIGNS OF INFLAMMATORY ARTHRITIS AND TOPHACEOUS DISEASE: ICD-10-CM

## 2023-08-01 DIAGNOSIS — N52.9 ERECTILE DYSFUNCTION, UNSPECIFIED ERECTILE DYSFUNCTION TYPE: ICD-10-CM

## 2023-08-01 DIAGNOSIS — E55.9 VITAMIN D DEFICIENCY: Primary | ICD-10-CM

## 2023-08-01 DIAGNOSIS — R79.89 LOW TESTOSTERONE: ICD-10-CM

## 2023-08-01 DIAGNOSIS — D64.9 ANEMIA, UNSPECIFIED TYPE: ICD-10-CM

## 2023-08-01 DIAGNOSIS — K21.9 GASTROESOPHAGEAL REFLUX DISEASE, UNSPECIFIED WHETHER ESOPHAGITIS PRESENT: ICD-10-CM

## 2023-08-01 DIAGNOSIS — M10.9 GOUT, UNSPECIFIED CAUSE, UNSPECIFIED CHRONICITY, UNSPECIFIED SITE: ICD-10-CM

## 2023-08-01 DIAGNOSIS — C22.1 CHOLANGIOCARCINOMA (HCC): ICD-10-CM

## 2023-08-01 DIAGNOSIS — M06.9 RHEUMATOID ARTHRITIS, INVOLVING UNSPECIFIED SITE, UNSPECIFIED WHETHER RHEUMATOID FACTOR PRESENT (HCC): ICD-10-CM

## 2023-08-01 PROCEDURE — 3078F DIAST BP <80 MM HG: CPT | Performed by: INTERNAL MEDICINE

## 2023-08-01 PROCEDURE — 3074F SYST BP LT 130 MM HG: CPT | Performed by: INTERNAL MEDICINE

## 2023-08-01 PROCEDURE — 99211 OFF/OP EST MAY X REQ PHY/QHP: CPT | Performed by: INTERNAL MEDICINE

## 2023-08-01 PROCEDURE — 99214 OFFICE O/P EST MOD 30 MIN: CPT | Performed by: INTERNAL MEDICINE

## 2023-08-01 RX ORDER — FERROUS SULFATE 325(65) MG
1 TABLET ORAL DAILY
Qty: 90 TABLET | Refills: 2 | Status: SHIPPED | OUTPATIENT
Start: 2023-08-01

## 2023-08-01 RX ORDER — ERGOCALCIFEROL 1.25 MG/1
1 CAPSULE ORAL
Qty: 12 CAPSULE | Refills: 1 | Status: SHIPPED | OUTPATIENT
Start: 2023-08-01

## 2023-08-01 SDOH — ECONOMIC STABILITY: FOOD INSECURITY: WITHIN THE PAST 12 MONTHS, THE FOOD YOU BOUGHT JUST DIDN'T LAST AND YOU DIDN'T HAVE MONEY TO GET MORE.: NEVER TRUE

## 2023-08-01 SDOH — ECONOMIC STABILITY: FOOD INSECURITY: WITHIN THE PAST 12 MONTHS, YOU WORRIED THAT YOUR FOOD WOULD RUN OUT BEFORE YOU GOT MONEY TO BUY MORE.: NEVER TRUE

## 2023-08-01 SDOH — ECONOMIC STABILITY: INCOME INSECURITY: HOW HARD IS IT FOR YOU TO PAY FOR THE VERY BASICS LIKE FOOD, HOUSING, MEDICAL CARE, AND HEATING?: NOT HARD AT ALL

## 2023-08-01 ASSESSMENT — PATIENT HEALTH QUESTIONNAIRE - PHQ9
2. FEELING DOWN, DEPRESSED OR HOPELESS: 0
SUM OF ALL RESPONSES TO PHQ QUESTIONS 1-9: 0
SUM OF ALL RESPONSES TO PHQ QUESTIONS 1-9: 0
SUM OF ALL RESPONSES TO PHQ9 QUESTIONS 1 & 2: 0
1. LITTLE INTEREST OR PLEASURE IN DOING THINGS: 0
SUM OF ALL RESPONSES TO PHQ QUESTIONS 1-9: 0
SUM OF ALL RESPONSES TO PHQ QUESTIONS 1-9: 0

## 2023-08-01 ASSESSMENT — ENCOUNTER SYMPTOMS
EYES NEGATIVE: 1
RESPIRATORY NEGATIVE: 1
ALLERGIC/IMMUNOLOGIC NEGATIVE: 1

## 2023-08-07 DIAGNOSIS — I10 ESSENTIAL (PRIMARY) HYPERTENSION: ICD-10-CM

## 2023-08-08 RX ORDER — AMLODIPINE BESYLATE 10 MG/1
TABLET ORAL
Qty: 90 TABLET | Refills: 1 | Status: SHIPPED | OUTPATIENT
Start: 2023-08-08

## 2023-08-08 NOTE — TELEPHONE ENCOUNTER
PCP: Marilyn Queen MD    Last refill per chart:    amLODIPine (NORVASC) 10 MG tablet () 10 mg, Oral, DAILY Edit       Summary: Take 1 tablet by mouth daily, Disp-90 tablet, R-1  Normal   Dose, Frequency: 10 mg, DAILY  Start: 3/1/2023  End: 2023  Ord/Sold: 3/1/2023 (O)  Report  Long-term:   Pharmacy: [facility] [id number] - Sil[place] Kelsi, [address] [place] [phone] [facility] [phone]  Med Dose History       Patient Sig: Take 1 tablet by mouth daily       Ordered on: 3/1/2023       Authorized by: Annelise Agarwal: 90 tablet       Refills: 1 ordered        Future Appointments   Date Time Provider [address] [place] [address]   8/10/2023 11:20 AM ANA MARIA Chowdary Sched   10/18/2023  8:45 AM MD Geovanni Pool Sched   10/31/2023  8:00 AM Marilyn Queen MD IOC BS AMB   2024  9:40 AM ROMA Calle Sched

## 2023-08-22 ENCOUNTER — HOSPITAL ENCOUNTER (OUTPATIENT)
Facility: HOSPITAL | Age: 58
Discharge: HOME | End: 2023-08-25
Payer: MEDICAID

## 2023-08-22 DIAGNOSIS — C22.1 CHOLANGIOCARCINOMA (HCC): ICD-10-CM

## 2023-08-22 PROCEDURE — 6360000004 HC RX CONTRAST MEDICATION: Performed by: PHYSICIAN ASSISTANT

## 2023-08-22 PROCEDURE — A9577 INJ MULTIHANCE: HCPCS | Performed by: PHYSICIAN ASSISTANT

## 2023-08-22 PROCEDURE — 74183 MRI ABD W/O CNTR FLWD CNTR: CPT

## 2023-08-22 RX ADMIN — GADOBENATE DIMEGLUMINE 20 ML: 529 INJECTION, SOLUTION INTRAVENOUS at 06:58

## 2023-08-23 DIAGNOSIS — J30.2 SEASONAL ALLERGIES: ICD-10-CM

## 2023-08-23 NOTE — TELEPHONE ENCOUNTER
PCP: Sarah Zapata MD    Last refill per chart:  cetirizine (ZYRTEC) 10 MG tablet  Edit       Summary: TAKE 1 TABLET BY MOUTH DAILY, Disp-30 tablet, R-0  Normal   Start: 7/26/2023  Ord/Sold: 7/26/2023 (O)  Report  Taking:   Long-term:   Pharmacy: [facility] [id number] [place] [phone] [facility] [phone]  Med Dose History       Patient Sig: TAKE 1 TABLET BY MOUTH DAILY       Ordered on: 7/26/2023       Authorized by: Sarah Zapata       Dispense: 30 tablet       Refills: 0 ordered          Future Appointments   Date Time Provider [address]   10/2/2023  9:20 AM ANA MARIA Staley Coca Sched   10/18/2023  8:45 AM MD Tonia Alonzo Coca Sched   10/31/2023  8:00 AM Sarah Zapata MD IOC BS AMB   1/4/2024  9:40 AM Nichelle Morillo PA-C Sheldamari Coca Sched

## 2023-08-24 RX ORDER — CETIRIZINE HYDROCHLORIDE 10 MG/1
TABLET ORAL
Qty: 30 TABLET | Refills: 0 | Status: SHIPPED | OUTPATIENT
Start: 2023-08-24

## 2023-09-15 ENCOUNTER — CLINICAL DOCUMENTATION (OUTPATIENT)
Facility: HOSPITAL | Age: 58
End: 2023-09-15

## 2023-09-15 DIAGNOSIS — C22.1 CHOLANGIOCARCINOMA (HCC): Primary | ICD-10-CM

## 2023-09-15 NOTE — PROGRESS NOTES
physical exam was deferred    Labs Reviewed and notable for:   From 9/01/23 compared to 7/25/23    AST / ALT: 57/40 from 60/80  T Bili: 0.7 from 1.4  Albumin: 4.1 from 3.7  Platelets: 889 from 366  INR: 0.9    Assessment     Ricky Bustos is a 59 y.o. male with a history of cholangiocarcinoma and associated hepatic disease. MELD 3.0: 11 at 7/11/2023  8:30 AM  MELD-Na: 10 at 7/11/2023  8:30 AM  Calculated from:  Serum Creatinine: 0.78 MG/DL (Using min of 1 MG/DL) at 7/11/2023  8:30 AM  Serum Sodium: 138 mmol/L (Using max of 137 mmol/L) at 7/11/2023  8:30 AM  Total Bilirubin: 2.7 MG/DL at 7/11/2023  8:30 AM  Serum Albumin: 3.8 g/dL (Using max of 3.5 g/dL) at 7/11/2023  8:30 AM  INR(ratio): 0.9   (Using min of 1  ) at 7/11/2023  8:30 AM  Age at listing (hypothetical): 59 years  Sex: Male at 7/11/2023  8:30 AM    Pastor Sorenson Classification A    Liver nodule number and sizes:  - Segment 4 margin 4.0 x 2.6 cm  -Segment 1 caudal 5.3 x 2.0 cm  -Segment 8 margin possible small site measuring 9 x 17 mm     No current plans for surgical treatment    ECOG 0    Goal of treatment - delaying time to progression of disease    Plan   Case and images reviewed extensively by Dr. Tracy Fortune. The plan is as follows:    3 month follow up with MRI and labs prior  Referred to Dr. Susanna Fowler office for consideration of immunotherapy v chemotherapy     I have spent 25 minutes with the patient with greater than 50% of the time dedicated to the patient's counseling as well as the coordination of patient care.     Thank you,  CORINA Shelton

## 2023-10-11 DIAGNOSIS — J30.2 SEASONAL ALLERGIES: ICD-10-CM

## 2023-10-11 RX ORDER — CETIRIZINE HYDROCHLORIDE 10 MG/1
TABLET ORAL
Qty: 30 TABLET | Refills: 0 | Status: SHIPPED | OUTPATIENT
Start: 2023-10-11

## 2023-10-17 PROBLEM — D50.9 IRON DEFICIENCY ANEMIA: Status: ACTIVE | Noted: 2023-10-17

## 2023-10-17 PROBLEM — R42 DIZZINESS: Status: ACTIVE | Noted: 2023-10-17

## 2023-10-17 PROBLEM — M79.89 SWELLING OF BOTH LOWER EXTREMITIES: Status: ACTIVE | Noted: 2019-10-28

## 2023-10-17 PROBLEM — D70.2 DRUG-INDUCED NEUTROPENIA (HCC): Status: ACTIVE | Noted: 2023-10-17

## 2023-10-17 PROBLEM — K59.00 CONSTIPATION: Status: ACTIVE | Noted: 2023-10-17

## 2023-10-31 ENCOUNTER — OFFICE VISIT (OUTPATIENT)
Age: 58
End: 2023-10-31
Payer: MEDICAID

## 2023-10-31 VITALS
SYSTOLIC BLOOD PRESSURE: 129 MMHG | WEIGHT: 196 LBS | HEIGHT: 72 IN | RESPIRATION RATE: 18 BRPM | DIASTOLIC BLOOD PRESSURE: 75 MMHG | OXYGEN SATURATION: 99 % | HEART RATE: 69 BPM | TEMPERATURE: 98.5 F | BODY MASS INDEX: 26.55 KG/M2

## 2023-10-31 DIAGNOSIS — E79.0 HYPERURICEMIA WITHOUT SIGNS OF INFLAMMATORY ARTHRITIS AND TOPHACEOUS DISEASE: ICD-10-CM

## 2023-10-31 DIAGNOSIS — E55.9 VITAMIN D DEFICIENCY: ICD-10-CM

## 2023-10-31 DIAGNOSIS — E78.5 HYPERLIPIDEMIA, UNSPECIFIED HYPERLIPIDEMIA TYPE: ICD-10-CM

## 2023-10-31 DIAGNOSIS — Z00.00 HEALTHCARE MAINTENANCE: ICD-10-CM

## 2023-10-31 DIAGNOSIS — Z12.5 SCREENING FOR PROSTATE CANCER: ICD-10-CM

## 2023-10-31 DIAGNOSIS — I10 ESSENTIAL (PRIMARY) HYPERTENSION: ICD-10-CM

## 2023-10-31 DIAGNOSIS — D64.9 ANEMIA, UNSPECIFIED TYPE: ICD-10-CM

## 2023-10-31 DIAGNOSIS — N52.9 ERECTILE DYSFUNCTION, UNSPECIFIED ERECTILE DYSFUNCTION TYPE: ICD-10-CM

## 2023-10-31 DIAGNOSIS — M19.90 ARTHRITIS: ICD-10-CM

## 2023-10-31 DIAGNOSIS — C22.1 CHOLANGIOCARCINOMA (HCC): Primary | ICD-10-CM

## 2023-10-31 PROCEDURE — 3074F SYST BP LT 130 MM HG: CPT | Performed by: INTERNAL MEDICINE

## 2023-10-31 PROCEDURE — 99214 OFFICE O/P EST MOD 30 MIN: CPT | Performed by: INTERNAL MEDICINE

## 2023-10-31 PROCEDURE — 3078F DIAST BP <80 MM HG: CPT | Performed by: INTERNAL MEDICINE

## 2023-10-31 RX ORDER — ONDANSETRON 4 MG/1
4 TABLET, FILM COATED ORAL EVERY 8 HOURS PRN
Status: CANCELLED | OUTPATIENT
Start: 2023-10-31

## 2023-10-31 RX ORDER — INDOMETHACIN 50 MG/1
50 CAPSULE ORAL 2 TIMES DAILY WITH MEALS
Qty: 60 CAPSULE | Refills: 0 | Status: SHIPPED | OUTPATIENT
Start: 2023-10-31

## 2023-10-31 SDOH — ECONOMIC STABILITY: FOOD INSECURITY: WITHIN THE PAST 12 MONTHS, YOU WORRIED THAT YOUR FOOD WOULD RUN OUT BEFORE YOU GOT MONEY TO BUY MORE.: NEVER TRUE

## 2023-10-31 SDOH — ECONOMIC STABILITY: FOOD INSECURITY: WITHIN THE PAST 12 MONTHS, THE FOOD YOU BOUGHT JUST DIDN'T LAST AND YOU DIDN'T HAVE MONEY TO GET MORE.: NEVER TRUE

## 2023-10-31 SDOH — ECONOMIC STABILITY: INCOME INSECURITY: HOW HARD IS IT FOR YOU TO PAY FOR THE VERY BASICS LIKE FOOD, HOUSING, MEDICAL CARE, AND HEATING?: NOT HARD AT ALL

## 2023-10-31 ASSESSMENT — PATIENT HEALTH QUESTIONNAIRE - PHQ9
2. FEELING DOWN, DEPRESSED OR HOPELESS: 0
SUM OF ALL RESPONSES TO PHQ QUESTIONS 1-9: 0
SUM OF ALL RESPONSES TO PHQ QUESTIONS 1-9: 0
1. LITTLE INTEREST OR PLEASURE IN DOING THINGS: 0
SUM OF ALL RESPONSES TO PHQ QUESTIONS 1-9: 0
SUM OF ALL RESPONSES TO PHQ QUESTIONS 1-9: 0
SUM OF ALL RESPONSES TO PHQ9 QUESTIONS 1 & 2: 0

## 2023-10-31 ASSESSMENT — ENCOUNTER SYMPTOMS
ALLERGIC/IMMUNOLOGIC NEGATIVE: 1
GASTROINTESTINAL NEGATIVE: 1
EYES NEGATIVE: 1
RESPIRATORY NEGATIVE: 1

## 2023-10-31 NOTE — ASSESSMENT & PLAN NOTE
Patient will be discussing with his oncologist regarding getting colonoscopy and vaccinations done. Patient would let us know.

## 2023-11-06 ENCOUNTER — HOSPITAL ENCOUNTER (OUTPATIENT)
Facility: HOSPITAL | Age: 58
Discharge: HOME | End: 2023-11-09
Payer: MEDICAID

## 2023-11-06 DIAGNOSIS — C24.8 PERI-AMPULLARY CARCINOMA (HCC): ICD-10-CM

## 2023-11-06 PROCEDURE — 6360000004 HC RX CONTRAST MEDICATION: Performed by: NURSE PRACTITIONER

## 2023-11-06 PROCEDURE — A9577 INJ MULTIHANCE: HCPCS | Performed by: NURSE PRACTITIONER

## 2023-11-06 PROCEDURE — 74183 MRI ABD W/O CNTR FLWD CNTR: CPT

## 2023-11-06 PROCEDURE — 72197 MRI PELVIS W/O & W/DYE: CPT

## 2023-11-06 PROCEDURE — 71250 CT THORAX DX C-: CPT

## 2023-11-06 RX ADMIN — GADOBENATE DIMEGLUMINE 20 ML: 529 INJECTION, SOLUTION INTRAVENOUS at 13:25

## 2023-11-14 DIAGNOSIS — J30.2 SEASONAL ALLERGIES: ICD-10-CM

## 2023-11-14 RX ORDER — CETIRIZINE HYDROCHLORIDE 10 MG/1
TABLET ORAL
Qty: 30 TABLET | Refills: 0 | Status: SHIPPED | OUTPATIENT
Start: 2023-11-14

## 2023-11-14 NOTE — TELEPHONE ENCOUNTER
Please refill if appropriate or refuse medication if not appropriate.     Last refill: 10/11/2023    PCP: Clayton Hopson MD    Future Appointments   Date Time Provider [address]   1/4/2024  9:40 AM Lars Merino PA-C [facility]   1/25/2024  9:45 AM IOC LAB VISIT [facility] BS AMB   1/31/2024  8:20 AM Clayton Hopson MD [facility] BS AMB       Requested Prescriptions     Pending Prescriptions Disp Refills    cetirizine (ZYRTEC) 10 MG tablet [Pharmacy Med Name: CETIRIZINE HCL 10 MG TABLET] 30 tablet 0     Sig: TAKE 1 TABLET BY MOUTH DAILY

## 2023-11-29 NOTE — PROGRESS NOTES
are noted in the History of Present Illness. Physical Exam:   The physical exam was deferred    Labs Reviewed and notable for:   From 9/01/23 compared to 7/25/23    AST / ALT: 57/40 from 60/80  T Bili: 0.7 from 1.4  Albumin: 4.1 from 3.7  Platelets: 925 from 290  INR: 0.9    Assessment     Cate Griffin is a 59 y.o. male with a history of cholangiocarcinoma and associated hepatic disease. New labs pending    Yaakov Ishihara Classification A    Liver nodule number and sizes:  Extensive hepatic disease which has continued to progress beyond the point of IR intervention. Discussed with Mr. Joanne Cadet. No current plans for surgical treatment    ECOG 0    Goal of treatment - delaying time to progression of disease    Plan   Case and images reviewed extensively by Dr. Nagi Bishop. The plan is as follows:    IR will remain available as needed. Signing off for now. Discussed with Mr. Joanne Cadet his MRI and how further Y90 would be unlikely to benefit him at this point    I have spent 15 minutes with the patient with greater than 50% of the time dedicated to the patient's counseling as well as the coordination of patient care.     Thank you,  CORINA Michael

## 2023-11-30 PROBLEM — Z00.00 HEALTHCARE MAINTENANCE: Status: RESOLVED | Noted: 2023-10-31 | Resolved: 2023-11-30

## 2023-12-01 ENCOUNTER — CLINICAL DOCUMENTATION (OUTPATIENT)
Facility: HOSPITAL | Age: 58
End: 2023-12-01

## 2023-12-13 ENCOUNTER — OFFICE VISIT (OUTPATIENT)
Age: 58
End: 2023-12-13
Payer: MEDICAID

## 2023-12-13 VITALS
TEMPERATURE: 99 F | SYSTOLIC BLOOD PRESSURE: 139 MMHG | DIASTOLIC BLOOD PRESSURE: 80 MMHG | BODY MASS INDEX: 25.36 KG/M2 | WEIGHT: 187 LBS | HEART RATE: 70 BPM | OXYGEN SATURATION: 99 %

## 2023-12-13 DIAGNOSIS — R11.0 NAUSEA: ICD-10-CM

## 2023-12-13 DIAGNOSIS — J30.2 SEASONAL ALLERGIES: ICD-10-CM

## 2023-12-13 DIAGNOSIS — N52.9 ERECTILE DYSFUNCTION, UNSPECIFIED ERECTILE DYSFUNCTION TYPE: ICD-10-CM

## 2023-12-13 DIAGNOSIS — I10 ESSENTIAL (PRIMARY) HYPERTENSION: ICD-10-CM

## 2023-12-13 DIAGNOSIS — Z09 HOSPITAL DISCHARGE FOLLOW-UP: Primary | ICD-10-CM

## 2023-12-13 DIAGNOSIS — C22.1 CHOLANGIOCARCINOMA (HCC): ICD-10-CM

## 2023-12-13 PROCEDURE — 1111F DSCHRG MED/CURRENT MED MERGE: CPT | Performed by: INTERNAL MEDICINE

## 2023-12-13 PROCEDURE — 99215 OFFICE O/P EST HI 40 MIN: CPT | Performed by: INTERNAL MEDICINE

## 2023-12-13 RX ORDER — PROCHLORPERAZINE MALEATE 10 MG
10 TABLET ORAL EVERY 6 HOURS PRN
Qty: 60 TABLET | Refills: 1 | Status: SHIPPED | OUTPATIENT
Start: 2023-12-13

## 2023-12-13 RX ORDER — SILDENAFIL 100 MG/1
100 TABLET, FILM COATED ORAL PRN
Qty: 90 TABLET | Refills: 1 | Status: SHIPPED | OUTPATIENT
Start: 2023-12-13 | End: 2024-03-12

## 2023-12-13 RX ORDER — TADALAFIL 5 MG/1
5 TABLET ORAL DAILY
Qty: 90 TABLET | Refills: 3 | Status: SHIPPED | OUTPATIENT
Start: 2023-12-13

## 2023-12-13 RX ORDER — FLUTICASONE PROPIONATE 50 MCG
SPRAY, SUSPENSION (ML) NASAL
Qty: 1 EACH | Refills: 5 | Status: SHIPPED | OUTPATIENT
Start: 2023-12-13

## 2023-12-13 NOTE — PROGRESS NOTES
Tiera Worthington (: 1965) is a 59 y.o. male, here for evaluation of the following chief complaint(s):  Cancer (Liver cancer)       ASSESSMENT/PLAN:  Below is the assessment and plan developed based on review of pertinent history, physical exam, labs, studies, and medications. 1. Hospital discharge follow-up  -     [place] DISCHARGE MEDS RECONCILED W/ CURRENT OUTPATIENT MED LIST  2. Essential (primary) hypertension  Assessment & Plan:  Blood pressure is under control on current medication  3. Erectile dysfunction, unspecified erectile dysfunction type  -     sildenafil (VIAGRA) 100 MG tablet; Take 1 tablet by mouth as needed for Erectile Dysfunction, Disp-90 tablet, R-1Normal  -     tadalafil (CIALIS) 5 MG tablet; Take 1 tablet by mouth daily, Disp-90 tablet, R-3Normal  4. Seasonal allergies  -     fluticasone (FLONASE) 50 MCG/ACT nasal spray; SPRAY ONE SPRAY IN EACH NOSTRIL ONCE DAILY AT BEDTIME, Disp-1 each, R-5Normal  5. Nausea  -     prochlorperazine (COMPAZINE) 10 MG tablet; Take 1 tablet by mouth every 6 hours as needed (nausea), Disp-60 tablet, R-1Normal  6. Cholangiocarcinoma [facility])  Assessment & Plan: To follow with Dr. Jaxon Cr. Status post biliary drain         Orders:  -     CBC with Auto Differential; Future  -     Comprehensive Metabolic Panel; Future      Return in about 3 months (around 3/13/2024) for follow up on chronic conditions. SUBJECTIVE/OBJECTIVE:  HPI  Patient was hospitalized on  because of hyperbilirubinemia. Patient was found to have advanced cholangiocarcinoma, disease progressed with new liver masses with a dominant central liver mass producing intrahepatic biliary duct dilatation. Patient underwent ERCP which showed severe biliary stricture in the common hepatic duct. Patient underwent biliary sphinterectomy done. A 8.5 [place] external drain placed in the central left duct. Today patient is accompanied by his son  Patient has mild nausea and hiccups.   Patient is

## 2023-12-26 ENCOUNTER — TELEPHONE (OUTPATIENT)
Age: 58
End: 2023-12-26

## 2023-12-26 NOTE — TELEPHONE ENCOUNTER
Pt son called asking to get pt an appt ASAP pt is having increased fluid in his legs from the knee down to his ankles along with his elbows

## 2023-12-26 NOTE — TELEPHONE ENCOUNTER
Please suggest patient to make appointment . I can see patient tomorrow morning before 11.20 am  I have plenty of slots open.   Thanks

## 2023-12-27 ENCOUNTER — TELEPHONE (OUTPATIENT)
Age: 58
End: 2023-12-27

## 2023-12-27 NOTE — TELEPHONE ENCOUNTER
----- Message from [place], [place] sent at 12/27/2023  9:51 AM EST -----  Subject: Hospital Follow Up    QUESTIONS  What hospital was the Patient Discharged from? [facility]  Date of Discharge? 2023-12-24  Discharge Location? Home  Reason for hospitalization as patient stated? cholangiocarcinoma  What question does the patient have, if applicable?   ---------------------------------------------------------------------------  --------------  CALL BACK INFO  What is the best way for the office to contact you? OK to leave message on   voicemail  Preferred Call Back Phone Number? [phone]  ---------------------------------------------------------------------------  --------------  SCRIPT ANSWERS  Relationship to Patient? Other/Third Party  Representative Name? calli  Additional information verified (besides Name and Date of Birth)?  Phone   Number

## 2023-12-27 NOTE — TELEPHONE ENCOUNTER
I spoke with the patient. Patient has been scheduled for tomorrow with Dr. Toni Us when he has transportation.

## 2023-12-28 ENCOUNTER — OFFICE VISIT (OUTPATIENT)
Age: 58
End: 2023-12-28
Payer: MEDICAID

## 2023-12-28 VITALS
HEART RATE: 78 BPM | DIASTOLIC BLOOD PRESSURE: 75 MMHG | BODY MASS INDEX: 28.07 KG/M2 | TEMPERATURE: 97 F | SYSTOLIC BLOOD PRESSURE: 130 MMHG | OXYGEN SATURATION: 98 % | RESPIRATION RATE: 16 BRPM | WEIGHT: 207 LBS

## 2023-12-28 DIAGNOSIS — R18.8 OTHER ASCITES: Primary | ICD-10-CM

## 2023-12-28 DIAGNOSIS — C22.1 CHOLANGIOCARCINOMA (HCC): ICD-10-CM

## 2023-12-28 PROCEDURE — 3078F DIAST BP <80 MM HG: CPT | Performed by: INTERNAL MEDICINE

## 2023-12-28 PROCEDURE — 99214 OFFICE O/P EST MOD 30 MIN: CPT | Performed by: INTERNAL MEDICINE

## 2023-12-28 PROCEDURE — 3075F SYST BP GE 130 - 139MM HG: CPT | Performed by: INTERNAL MEDICINE

## 2023-12-28 RX ORDER — OXYCODONE HYDROCHLORIDE 5 MG/1
5 CAPSULE ORAL EVERY 6 HOURS PRN
COMMUNITY

## 2023-12-28 ASSESSMENT — PATIENT HEALTH QUESTIONNAIRE - PHQ9
SUM OF ALL RESPONSES TO PHQ QUESTIONS 1-9: 0
SUM OF ALL RESPONSES TO PHQ QUESTIONS 1-9: 0
2. FEELING DOWN, DEPRESSED OR HOPELESS: 0
SUM OF ALL RESPONSES TO PHQ QUESTIONS 1-9: 0
SUM OF ALL RESPONSES TO PHQ QUESTIONS 1-9: 0
1. LITTLE INTEREST OR PLEASURE IN DOING THINGS: 0
SUM OF ALL RESPONSES TO PHQ9 QUESTIONS 1 & 2: 0

## 2023-12-28 NOTE — PROGRESS NOTES
Adriel Urbina presents today for Follow up               1. \"Have you been to the ER, urgent care clinic since your last visit?  Hospitalized since your last visit?\" no    2. \"Have you seen or consulted any other health care providers outside of the [facility] since your last visit?\" no     3. For patients aged 45-75: Has the patient had a colonoscopy / FIT/ Cologuard? Yes - no Care Gap present      If the patient is female:    4. For patients aged 40-74: Has the patient had a mammogram within the past 2 years? NA - based on age or sex      5. For patients aged 21-65: Has the patient had a pap smear? NA - based on age or sex

## 2024-01-02 PROBLEM — R18.8 OTHER ASCITES: Status: ACTIVE | Noted: 2024-01-02

## 2024-01-02 NOTE — ASSESSMENT & PLAN NOTE
Patient is suggested to go to the ER for further evaluation and management.  Patient informed that he is visiting the oncologist today and will decide after discussing with him.

## 2024-01-02 NOTE — PROGRESS NOTES
Thank  Continue  Also  Adriel Urbina (: 1965) is a 58 y.o. male, here for evaluation of the following chief complaint(s):  Follow-up       ASSESSMENT/PLAN:  Below is the assessment and plan developed based on review of pertinent history, physical exam, labs, studies, and medications.    1. Other ascites  Assessment & Plan:   Patient is suggested to go to the ER for further evaluation and management.  Patient informed that he is visiting the oncologist today and will decide after discussing with him.  2. Cholangiocarcinoma (HCC)  Assessment & Plan:   Follows with oncology.  S/p biliary drain.      Return if symptoms worsen or fail to improve.     SUBJECTIVE/OBJECTIVE:  HPI  Patient is complaining of increased abdominal distention for last few days.  Patient also has bilateral pedal edema.    No chest pain or palpitations or shortness of breath.  Patient has abdominal discomfort.  Patient has been taking oxycodone for abdominal pain only if absolutely needed.  Patient also complains of reduced appetite.    Reviewed last labs with the patient.  Hemoglobin 7.5  Serum albumin 2.9.  Bilirubin 26.3    ROS as above    Vitals:    23 1013   BP: 130/75   Site: Left Upper Arm   Position: Sitting   Cuff Size: Small Adult   Pulse: 78   Resp: 16   Temp: 97 °F (36.1 °C)   TempSrc: Temporal   SpO2: 98%   Weight: 93.9 kg (207 lb)     Physical Exam  Constitutional:       Appearance: Normal appearance.   HENT:      Head: Normocephalic and atraumatic.      Nose: Nose normal.      Mouth/Throat:      Mouth: Mucous membranes are moist.   Eyes:      Extraocular Movements: Extraocular movements intact.      Pupils: Pupils are equal, round, and reactive to light.   Cardiovascular:      Rate and Rhythm: Normal rate and regular rhythm.      Heart sounds: Normal heart sounds.   Pulmonary:      Breath sounds: Normal breath sounds.   Abdominal:      Comments: Ascites noted   Musculoskeletal:         General: Normal range of motion.

## 2024-01-12 DIAGNOSIS — Z87.891 EX-SMOKER: ICD-10-CM

## 2024-01-12 DIAGNOSIS — Z12.2 SCREENING FOR LUNG CANCER: ICD-10-CM
